# Patient Record
Sex: FEMALE | Race: WHITE | NOT HISPANIC OR LATINO | Employment: OTHER | URBAN - METROPOLITAN AREA
[De-identification: names, ages, dates, MRNs, and addresses within clinical notes are randomized per-mention and may not be internally consistent; named-entity substitution may affect disease eponyms.]

---

## 2017-07-13 RX ORDER — ESOMEPRAZOLE MAGNESIUM 40 MG/1
CAPSULE, DELAYED RELEASE ORAL
Qty: 90 CAPSULE | Refills: 2 | OUTPATIENT
Start: 2017-07-13

## 2017-09-20 ENCOUNTER — CLINICAL SUPPORT (OUTPATIENT)
Dept: AUDIOLOGY | Facility: CLINIC | Age: 47
End: 2017-09-20
Payer: COMMERCIAL

## 2017-09-20 ENCOUNTER — OFFICE VISIT (OUTPATIENT)
Dept: OTOLARYNGOLOGY | Facility: CLINIC | Age: 47
End: 2017-09-20
Payer: COMMERCIAL

## 2017-09-20 VITALS
WEIGHT: 257.69 LBS | SYSTOLIC BLOOD PRESSURE: 129 MMHG | BODY MASS INDEX: 40.36 KG/M2 | DIASTOLIC BLOOD PRESSURE: 90 MMHG | HEART RATE: 76 BPM

## 2017-09-20 DIAGNOSIS — R51.9 HEADACHE, UNSPECIFIED HEADACHE TYPE: ICD-10-CM

## 2017-09-20 DIAGNOSIS — H68.003 SALPINGITIS OF BOTH EUSTACHIAN TUBES: Primary | ICD-10-CM

## 2017-09-20 DIAGNOSIS — J32.2 CHRONIC ETHMOIDAL SINUSITIS: ICD-10-CM

## 2017-09-20 DIAGNOSIS — H93.11 TINNITUS OF RIGHT EAR: Primary | ICD-10-CM

## 2017-09-20 PROCEDURE — 99999 PR PBB SHADOW E&M-EST. PATIENT-LVL III: CPT | Mod: PBBFAC,,, | Performed by: OTOLARYNGOLOGY

## 2017-09-20 PROCEDURE — 99999 PR PBB SHADOW E&M-EST. PATIENT-LVL I: CPT | Mod: PBBFAC,,,

## 2017-09-20 PROCEDURE — 3008F BODY MASS INDEX DOCD: CPT | Mod: S$GLB,,, | Performed by: OTOLARYNGOLOGY

## 2017-09-20 PROCEDURE — 31231 NASAL ENDOSCOPY DX: CPT | Mod: S$GLB,,, | Performed by: OTOLARYNGOLOGY

## 2017-09-20 PROCEDURE — 3074F SYST BP LT 130 MM HG: CPT | Mod: S$GLB,,, | Performed by: OTOLARYNGOLOGY

## 2017-09-20 PROCEDURE — 3080F DIAST BP >= 90 MM HG: CPT | Mod: S$GLB,,, | Performed by: OTOLARYNGOLOGY

## 2017-09-20 PROCEDURE — 92550 TYMPANOMETRY & REFLEX THRESH: CPT | Mod: S$GLB,,, | Performed by: AUDIOLOGIST

## 2017-09-20 PROCEDURE — 92557 COMPREHENSIVE HEARING TEST: CPT | Mod: S$GLB,,, | Performed by: AUDIOLOGIST

## 2017-09-20 PROCEDURE — 99214 OFFICE O/P EST MOD 30 MIN: CPT | Mod: 25,S$GLB,, | Performed by: OTOLARYNGOLOGY

## 2017-09-20 RX ORDER — CITALOPRAM 40 MG/1
40 TABLET, FILM COATED ORAL DAILY
COMMUNITY
End: 2018-10-23

## 2017-09-20 RX ORDER — LEVOTHYROXINE SODIUM 125 UG/1
150 TABLET ORAL DAILY
COMMUNITY
End: 2018-12-20

## 2017-09-20 RX ORDER — MONTELUKAST SODIUM 10 MG/1
10 TABLET ORAL NIGHTLY
COMMUNITY
End: 2019-01-10

## 2017-09-20 RX ORDER — MINERAL OIL
180 ENEMA (ML) RECTAL DAILY
COMMUNITY
End: 2019-01-10

## 2017-09-20 NOTE — PROGRESS NOTES
Subjective:      Ailyn is a 47 y.o. female who comes for follow-up of sinusitis.  Last seen 1-1/2 years ago.  Now main complaint is intermittent aural fullness, bilateral moderate severity, unsure if it affects hearing.  Treated in urgent care 6 times this year, twice in past month, each time with antibiotics.  Also daily, constant headache in bilateral cheeks and forehead.  Breathing well through nose, very keen sense of smell.  Using only saline rinse.    SNOT-22 score = 52, NOSE score = 15%, ETDQ-7 score = 4.6    The patient's medications, allergies, past medical, surgical, social and family histories were reviewed and updated as appropriate.    A detailed review of systems was obtained with pertinent positives as per the above HPI, and otherwise negative.        Objective:     BP (!) 129/90   Pulse 76   Wt 116.9 kg (257 lb 11.5 oz)   BMI 40.36 kg/m²        Constitutional:   She appears well-developed. She is cooperative. Normal speech.  No hoarse voice.      Head:  Normocephalic. Salivary glands normal.  Facial strength is normal.      Ears:    Right Ear: No drainage or tenderness. Tympanic membrane is not perforated. Tympanic membrane mobility is normal. No middle ear effusion. No decreased hearing is noted.   Left Ear: No drainage or tenderness. Tympanic membrane is not perforated. Tympanic membrane mobility is normal.  No middle ear effusion. No decreased hearing is noted.     Nose:  No mucosal edema, rhinorrhea, septal deviation or polyps. No epistaxis. Turbinates normal, no turbinate masses and no turbinate hypertrophy.  Right sinus exhibits no maxillary sinus tenderness and no frontal sinus tenderness. Left sinus exhibits no maxillary sinus tenderness and no frontal sinus tenderness.     Mouth/Throat  Oropharynx clear and moist without lesions or asymmetry and normal uvula midline. She does not have dentures. Normal dentition. No oral lesions or mucous membrane lesions. No oropharyngeal exudate or  posterior oropharyngeal erythema. Mirror exam not performed due to patient tolerance.  Mirror exam not performed due to patient tolerance.      Neck:  Neck normal without thyromegaly masses, asymmetry, normal tracheal structure, crepitus, and tenderness, thyroid normal, trachea normal and no adenopathy. Normal range of motion present.     She has no cervical adenopathy.     Cardiovascular:   Regular rhythm.      Pulmonary/Chest:   Effort normal.     Psychiatric:   She has a normal mood and affect. Her speech is normal and behavior is normal.     Neurological:   No cranial nerve deficit.     Skin:   No rash noted.       Procedure    Nasal endoscopy performed.  See procedure note.     Left nasal valve     Left middle meatus     Left frontal ostia     Left ET with edema      Right nasal valve      Right middle meatus     Right frontal osium     Right ET with edema        Data Reviewed    WBC (K/uL)   Date Value   02/11/2015 11.01     Eosinophil% (%)   Date Value   02/11/2015 3.1     Eos # (K/uL)   Date Value   02/11/2015 0.3     Platelets (K/uL)   Date Value   02/11/2015 285     Glucose (mg/dL)   Date Value   02/11/2015 140 (H)     No results found for: IGE    Pathology report indicated non-eosinophilic chronic inflammation.          I independently reviewed the tracings of the complete audiometric evaluation performed today.  I reviewed the audiogram with the patient as well.  Pertinent findings include a normal study.          Assessment:     1. Salpingitis of both eustachian tubes    2. Chronic ethmoidal sinusitis    3. Headache, unspecified headache type         Plan:     Rx Qnasl to treat salpingitis.  Suggested referral to neurology to address non-sinugenic headaches, but she wishes to address eyeglasses first, thinks that might be the cause.  May consider BDET in future.  Return if symptoms worsen or fail to improve.

## 2017-09-20 NOTE — LETTER
September 20, 2017      [place]  [address] [address]  [place] [address]           [facility] - Otorhinolaryngology  [address]  [facility] [address]  Phone: [phone]  Fax: [phone]          Patient: Ailyn Yanes   MR Number: [id number]   YOB: 1970   Date of Visit: 9/20/2017       Dear Consuelo Kan:    Thank you for referring Ailyn Yanes to me for evaluation. Attached you will find relevant portions of my assessment and plan of care.    If you have questions, please do not hesitate to call me. I look forward to following Ailyn Yanes along with you.    Sincerely,    Nahun Luna MD    Enclosure  CC:  No Recipients    If you would like to receive this communication electronically, please contact [email or web address] or [phone] to request more information on [employer] access.    For providers and/or their staff who would like to refer a patient to [facility], please contact us through our one-stop-shop provider referral line, [facility], at [phone].    If you feel you have received this communication in error or would no longer like to receive these types of communications, please e-mail [email or web address]

## 2017-09-20 NOTE — PROCEDURES
Nasal/sinus endoscopy  Date/Time: 9/20/2017 4:59 PM  Performed by: ELIZABETH LEAHY  Authorized by: ELIZABETH LEAHY     Consent Done?:  Yes (Verbal)  Anesthesia:     Local anesthetic:  Lidocaine 4% and Isaac-Synephrine 1/2%    Patient tolerance:  Patient tolerated the procedure well with no immediate complications  Nose:     Procedure Performed:  Nasal Endoscopy  External:      No external nasal deformity  Intranasal:      Mucosa no polyps     Mucosa ulcers not present     No mucosa lesions present     Turbinates not enlarged     No septum gross deformity  Nasopharynx:      No mucosa lesions     Adenoids not present     Posterior choanae patent     Eustachian tube not patent     Sinuses all patent, clear and well-healed.  No exudate or polyps to explain headaches.  Moderate bilateral Eustachian tube inflammation with mucus.

## 2017-09-22 ENCOUNTER — TELEPHONE (OUTPATIENT)
Dept: OTOLARYNGOLOGY | Facility: CLINIC | Age: 47
End: 2017-09-22

## 2017-09-22 DIAGNOSIS — J32.2 CHRONIC ETHMOIDAL SINUSITIS: Primary | ICD-10-CM

## 2017-09-22 NOTE — TELEPHONE ENCOUNTER
LPN spoke with patient and advised her that her medication was sent in. Patient verbalized understanding.

## 2017-11-29 ENCOUNTER — OFFICE VISIT (OUTPATIENT)
Dept: DERMATOLOGY | Facility: CLINIC | Age: 47
End: 2017-11-29
Payer: COMMERCIAL

## 2017-11-29 DIAGNOSIS — L29.9 PRURITIC CONDITION: Primary | ICD-10-CM

## 2017-11-29 DIAGNOSIS — R21 RASH AND NONSPECIFIC SKIN ERUPTION: ICD-10-CM

## 2017-11-29 PROCEDURE — 99999 PR PBB SHADOW E&M-EST. PATIENT-LVL III: CPT | Mod: PBBFAC,,, | Performed by: DERMATOLOGY

## 2017-11-29 PROCEDURE — 88312 SPECIAL STAINS GROUP 1: CPT | Mod: 26,,, | Performed by: PATHOLOGY

## 2017-11-29 PROCEDURE — 99202 OFFICE O/P NEW SF 15 MIN: CPT | Mod: 25,S$GLB,, | Performed by: DERMATOLOGY

## 2017-11-29 PROCEDURE — 88305 TISSUE EXAM BY PATHOLOGIST: CPT | Performed by: PATHOLOGY

## 2017-11-29 NOTE — PROGRESS NOTES
Subjective:       Patient ID:  Ailyn Yanes is a 47 y.o. female who presents for   Chief Complaint   Patient presents with    Rash     on back x 4 wks itchy,bleed,tender      Rash  - Initial  Affected locations: back  Duration: 4 weeks (4-5 years; now flaring x 4 weeks)  Signs / symptoms: itching, tender and burning  Treatments tried: keto cream,tac cream, betamethasone,bactroban; Clindamycin, minocycline.  Improvement on treatment: no relief    Currently using ketoconazole cream bid x 4 weeks  Has hot tub but does not go in it  Sweats excessively but has improved  Denies personal or family h/o skin cancer    Past Medical History:   Diagnosis Date    Allergy     Hypertension     Prediabetes     Thyroid disease      Review of Systems   Constitutional: Negative for fever, chills, fatigue and malaise.   Skin: Positive for itching and rash.   Allergic/Immunologic: Positive for environmental allergies.        Objective:    Physical Exam   Constitutional: She appears well-developed and well-nourished. No distress.   Neurological: She is alert and oriented to person, place, and time. She is not disoriented.   Psychiatric: She has a normal mood and affect.   Skin:   Areas Examined (abnormalities noted in diagram):   Head / Face Inspection Performed  Neck Inspection Performed  Chest / Axilla Inspection Performed  Abdomen Inspection Performed  Back Inspection Performed  RUE Inspected  LUE Inspection Performed              Diagram Legend     Erythematous scaling macule/papule c/w actinic keratosis       Vascular papule c/w angioma      Pigmented verrucoid papule/plaque c/w seborrheic keratosis      Yellow umbilicated papule c/w sebaceous hyperplasia      Irregularly shaped tan macule c/w lentigo     1-2 mm smooth white papules consistent with Milia      Movable subcutaneous cyst with punctum c/w epidermal inclusion cyst      Subcutaneous movable cyst c/w pilar cyst      Firm pink to brown papule c/w dermatofibroma       Pedunculated fleshy papule(s) c/w skin tag(s)      Evenly pigmented macule c/w junctional nevus     Mildly variegated pigmented, slightly irregular-bordered macule c/w mildly atypical nevus      Flesh colored to evenly pigmented papule c/w intradermal nevus       Pink pearly papule/plaque c/w basal cell carcinoma      Erythematous hyperkeratotic cursted plaque c/w SCC      Surgical scar with no sign of skin cancer recurrence      Open and closed comedones      Inflammatory papules and pustules      Verrucoid papule consistent consistent with wart     Erythematous eczematous patches and plaques     Dystrophic onycholytic nail with subungual debris c/w onychomycosis     Umbilicated papule    Erythematous-base heme-crusted tan verrucoid plaque consistent with inflamed seborrheic keratosis     Erythematous Silvery Scaling Plaque c/w Psoriasis     See annotation      Assessment / Plan:      Pathology Orders:      Normal Orders This Visit    Tissue Specimen To Pathology, Dermatology     Questions:    Directional Terms:  Other(comment)    Clinical information:  folliculitis vs other    Specific Site:  lower back        Pruritic Condition  Excessive sweating can contribute to this condition (suspect folliculitis)  Continue ketoconazole cream 2% bid    Rash and nonspecific skin eruption  -     Tissue Specimen To Pathology, Dermatology  Punch biopsy procedure note:  Punch biopsy performed after verbal consent obtained. Area marked and prepped with alcohol. Approximately 1cc of 1% lidocaine with epinephrine injected. 4 mm disposable punch used to remove lesion. Hemostasis obtained and biopsy site closed with 2 Prolene sutures. Wound care instructions reviewed with patient and handout given.             Return in about 2 weeks (around 12/13/2017).

## 2017-12-12 ENCOUNTER — OFFICE VISIT (OUTPATIENT)
Dept: DERMATOLOGY | Facility: CLINIC | Age: 47
End: 2017-12-12
Payer: COMMERCIAL

## 2017-12-12 ENCOUNTER — LAB VISIT (OUTPATIENT)
Dept: LAB | Facility: HOSPITAL | Age: 47
End: 2017-12-12
Attending: DERMATOLOGY
Payer: COMMERCIAL

## 2017-12-12 DIAGNOSIS — L73.9 CHRONIC FOLLICULITIS: Primary | ICD-10-CM

## 2017-12-12 DIAGNOSIS — Z51.81 ENCOUNTER FOR MEDICATION MONITORING: ICD-10-CM

## 2017-12-12 LAB
ALBUMIN SERPL BCP-MCNC: 3.6 G/DL
ALP SERPL-CCNC: 128 U/L
ALT SERPL W/O P-5'-P-CCNC: 25 U/L
ANION GAP SERPL CALC-SCNC: 11 MMOL/L
AST SERPL-CCNC: 18 U/L
BASOPHILS # BLD AUTO: 0.09 K/UL
BASOPHILS NFR BLD: 0.8 %
BILIRUB SERPL-MCNC: 0.3 MG/DL
BUN SERPL-MCNC: 8 MG/DL
CALCIUM SERPL-MCNC: 9.6 MG/DL
CHLORIDE SERPL-SCNC: 103 MMOL/L
CHOLEST SERPL-MCNC: 126 MG/DL
CHOLEST/HDLC SERPL: 3.2 {RATIO}
CO2 SERPL-SCNC: 24 MMOL/L
CREAT SERPL-MCNC: 1 MG/DL
DIFFERENTIAL METHOD: ABNORMAL
EOSINOPHIL # BLD AUTO: 0.3 K/UL
EOSINOPHIL NFR BLD: 2.3 %
ERYTHROCYTE [DISTWIDTH] IN BLOOD BY AUTOMATED COUNT: 13.2 %
EST. GFR  (AFRICAN AMERICAN): >60 ML/MIN/1.73 M^2
EST. GFR  (NON AFRICAN AMERICAN): >60 ML/MIN/1.73 M^2
GLUCOSE SERPL-MCNC: 210 MG/DL
HCG INTACT+B SERPL-ACNC: <1.2 MIU/ML
HCT VFR BLD AUTO: 37.9 %
HDLC SERPL-MCNC: 39 MG/DL
HDLC SERPL: 31 %
HGB BLD-MCNC: 12.4 G/DL
IMM GRANULOCYTES # BLD AUTO: 0.09 K/UL
IMM GRANULOCYTES NFR BLD AUTO: 0.8 %
LDLC SERPL CALC-MCNC: 73.6 MG/DL
LYMPHOCYTES # BLD AUTO: 2.7 K/UL
LYMPHOCYTES NFR BLD: 22.7 %
MCH RBC QN AUTO: 29.2 PG
MCHC RBC AUTO-ENTMCNC: 32.7 G/DL
MCV RBC AUTO: 89 FL
MONOCYTES # BLD AUTO: 0.5 K/UL
MONOCYTES NFR BLD: 4.5 %
NEUTROPHILS # BLD AUTO: 8.3 K/UL
NEUTROPHILS NFR BLD: 68.9 %
NONHDLC SERPL-MCNC: 87 MG/DL
NRBC BLD-RTO: 0 /100 WBC
PLATELET # BLD AUTO: 315 K/UL
PMV BLD AUTO: 10.1 FL
POTASSIUM SERPL-SCNC: 3.7 MMOL/L
PROT SERPL-MCNC: 7.7 G/DL
RBC # BLD AUTO: 4.25 M/UL
SODIUM SERPL-SCNC: 138 MMOL/L
TRIGL SERPL-MCNC: 67 MG/DL
WBC # BLD AUTO: 12 K/UL

## 2017-12-12 PROCEDURE — 99213 OFFICE O/P EST LOW 20 MIN: CPT | Mod: S$GLB,,, | Performed by: DERMATOLOGY

## 2017-12-12 PROCEDURE — 85025 COMPLETE CBC W/AUTO DIFF WBC: CPT

## 2017-12-12 PROCEDURE — 84702 CHORIONIC GONADOTROPIN TEST: CPT

## 2017-12-12 PROCEDURE — 36415 COLL VENOUS BLD VENIPUNCTURE: CPT

## 2017-12-12 PROCEDURE — 80053 COMPREHEN METABOLIC PANEL: CPT

## 2017-12-12 PROCEDURE — 99999 PR PBB SHADOW E&M-EST. PATIENT-LVL II: CPT | Mod: PBBFAC,,, | Performed by: DERMATOLOGY

## 2017-12-12 PROCEDURE — 80061 LIPID PANEL: CPT

## 2017-12-12 RX ORDER — ERYTHROMYCIN ETHYLSUCCINATE 400 MG/1
400 TABLET ORAL 2 TIMES DAILY WITH MEALS
Qty: 60 TABLET | Refills: 0 | Status: SHIPPED | OUTPATIENT
Start: 2017-12-12 | End: 2017-12-29 | Stop reason: SDUPTHER

## 2017-12-12 NOTE — PROGRESS NOTES
Subjective:       Patient ID:  Ailyn Yanes is a 47 y.o. female who presents for   Chief Complaint   Patient presents with    Rash     s/r and discussion of treatment options     HPI  46 yo F presents for s/r and biopsy results discussion  Rash x 4 years  Treatments tried: keto cream,tac cream, betamethasone,bactroban; Clindamycin, minocycline    Past Medical History:   Diagnosis Date    Allergy     Hypertension     Prediabetes     Thyroid disease      Review of Systems   Constitutional: Negative for fever, chills, fatigue and malaise.   Genitourinary: Negative for irregular periods (LMP 4 years ago; on Depo Provera; not yet perimenopausal based on labwork; still sexually active with ).   Skin: Positive for itching and rash.   Allergic/Immunologic: Positive for environmental allergies.        Objective:    Physical Exam   Constitutional: She appears well-developed and well-nourished. No distress.   Neurological: She is alert and oriented to person, place, and time. She is not disoriented.   Psychiatric: She has a normal mood and affect.   Skin:   Areas Examined (abnormalities noted in diagram):   Head / Face Inspection Performed  Neck Inspection Performed  Chest / Axilla Inspection Performed  Abdomen Inspection Performed  Back Inspection Performed  RUE Inspected  LUE Inspection Performed              Diagram Legend     Erythematous scaling macule/papule c/w actinic keratosis       Vascular papule c/w angioma      Pigmented verrucoid papule/plaque c/w seborrheic keratosis      Yellow umbilicated papule c/w sebaceous hyperplasia      Irregularly shaped tan macule c/w lentigo     1-2 mm smooth white papules consistent with Milia      Movable subcutaneous cyst with punctum c/w epidermal inclusion cyst      Subcutaneous movable cyst c/w pilar cyst      Firm pink to brown papule c/w dermatofibroma      Pedunculated fleshy papule(s) c/w skin tag(s)      Evenly pigmented macule c/w junctional nevus      Mildly variegated pigmented, slightly irregular-bordered macule c/w mildly atypical nevus      Flesh colored to evenly pigmented papule c/w intradermal nevus       Pink pearly papule/plaque c/w basal cell carcinoma      Erythematous hyperkeratotic cursted plaque c/w SCC      Surgical scar with no sign of skin cancer recurrence      Open and closed comedones      Inflammatory papules and pustules      Verrucoid papule consistent consistent with wart     Erythematous eczematous patches and plaques     Dystrophic onycholytic nail with subungual debris c/w onychomycosis     Umbilicated papule    Erythematous-base heme-crusted tan verrucoid plaque consistent with inflamed seborrheic keratosis     Erythematous Silvery Scaling Plaque c/w Psoriasis     See annotation    PATHOLOGY 11/19/17  Skin, lower back, punch biopsy:  - CHRONIC PERIFOLLICULITIS.  MICROSCOPIC DESCRIPTION: Sections show a dilated follicle surrounded by a dense lymphohistiocytic infiltrate.  The adjacent dermis is mildly fibrotic, and there is an adjacent mild perivascular lymphohistiocytic infiltrate with rarer  plasma cells and neutrophils. Gram stain is negative for bacteria. GMS stain is negative for fungi. Appropriately  reactive controls were reviewed. Multiple levels were examined    Assessment / Plan:        Chronic folliculitis  iPledge handout given  Pt allergic to clindamycin and minocycline (rash) so will try EES while she decides if she'd like to proceed with accutane  -     erythromycin ethylsuccinate (EES) 400 MG Tab; Take 1 tablet (400 mg total) by mouth 2 (two) times daily with meals.  Dispense: 60 tablet; Refill: 0  Discussed risks and benefits of Isotretinoin including but not limited to dry eyes, dry skin, and dry lips; headaches; nosebleeds; muscle aches; joint aches; elevated liver functions; elevated cholesterol or triglycerides; depression; diarrhea/stomach cramping (inflammatory bowel disease); increased sun sensitivity; birth  defects. No laser while on Isotretinoin or for 4-6 months after completion of Isotretinoin course. No waxing and no donating blood while on Isotretinoin or for one month after completion of Isotretinoin course.     If pt chooses to proceed, will get consents signed and enter patient into Ipledge program.  Will check baseline lipid panel, liver function tests and pregnancy test.   If labs normal, will start Isotretinoin at 80 mg po daily.   Brochures reviewed and provided.    Encounter for medication monitoring  -     CBC auto differential; Future  -     Comprehensive metabolic panel; Future  -     Lipid panel; Future  -     hCG, quantitative; Future         Return in about 4 weeks (around 1/9/2018).

## 2017-12-29 DIAGNOSIS — L73.9 CHRONIC FOLLICULITIS: ICD-10-CM

## 2018-01-03 RX ORDER — ERYTHROMYCIN ETHYLSUCCINATE 400 MG/1
400 TABLET ORAL 2 TIMES DAILY WITH MEALS
Qty: 60 TABLET | Refills: 0 | Status: SHIPPED | OUTPATIENT
Start: 2018-01-03 | End: 2018-10-23 | Stop reason: ALTCHOICE

## 2018-05-14 ENCOUNTER — OFFICE VISIT (OUTPATIENT)
Dept: ORTHOPEDICS | Facility: CLINIC | Age: 48
End: 2018-05-14
Payer: COMMERCIAL

## 2018-05-14 ENCOUNTER — HOSPITAL ENCOUNTER (OUTPATIENT)
Dept: RADIOLOGY | Facility: HOSPITAL | Age: 48
Discharge: HOME | End: 2018-05-14
Attending: PHYSICIAN ASSISTANT
Payer: COMMERCIAL

## 2018-05-14 VITALS — BODY MASS INDEX: 37.67 KG/M2 | WEIGHT: 240.5 LBS

## 2018-05-14 DIAGNOSIS — M25.561 RIGHT KNEE PAIN, UNSPECIFIED CHRONICITY: ICD-10-CM

## 2018-05-14 DIAGNOSIS — M25.561 RIGHT KNEE PAIN, UNSPECIFIED CHRONICITY: Primary | ICD-10-CM

## 2018-05-14 PROCEDURE — 99999 PR PBB SHADOW E&M-EST. PATIENT-LVL III: CPT | Mod: PBBFAC,,, | Performed by: PHYSICIAN ASSISTANT

## 2018-05-14 PROCEDURE — 3008F BODY MASS INDEX DOCD: CPT | Mod: CPTII,S$GLB,, | Performed by: PHYSICIAN ASSISTANT

## 2018-05-14 PROCEDURE — 99203 OFFICE O/P NEW LOW 30 MIN: CPT | Mod: S$GLB,,, | Performed by: PHYSICIAN ASSISTANT

## 2018-05-14 PROCEDURE — 73564 X-RAY EXAM KNEE 4 OR MORE: CPT | Mod: 26,RT,, | Performed by: RADIOLOGY

## 2018-05-14 PROCEDURE — 73562 X-RAY EXAM OF KNEE 3: CPT | Mod: 26,XS,LT, | Performed by: RADIOLOGY

## 2018-05-14 PROCEDURE — 73562 X-RAY EXAM OF KNEE 3: CPT | Mod: TC,LT

## 2018-05-14 RX ORDER — SERTRALINE HYDROCHLORIDE 25 MG/1
25 TABLET, FILM COATED ORAL DAILY
COMMUNITY
End: 2018-12-20

## 2018-05-14 RX ORDER — METFORMIN HYDROCHLORIDE 500 MG/1
500 TABLET ORAL 2 TIMES DAILY WITH MEALS
COMMUNITY

## 2018-05-14 NOTE — LETTER
May 14, 2018      Consuelo Kan, NP  [address]  [place] [id number]           [facility] - Orthopedics  [address]  [facility] [address]  Phone: [phone]          Patient: Ailyn Yanes   MR Number: [id number]   YOB: 1970   Date of Visit: 5/14/2018       Dear Consuelo Kan:    Thank you for referring Ailyn Yanes to me for evaluation. Attached you will find relevant portions of my assessment and plan of care.    If you have questions, please do not hesitate to call me. I look forward to following Ailyn Yanes along with you.    Sincerely,    Chayo Marks PA-C    Enclosure  CC:  No Recipients    If you would like to receive this communication electronically, please contact [email or web address] or [phone] to request more information on [employer] Link access.    For providers and/or their staff who would like to refer a patient to [facility], please contact us through our one-stop-shop provider referral line, [facility], at [phone].    If you feel you have received this communication in error or would no longer like to receive these types of communications, please e-mail [email or web address]

## 2018-05-14 NOTE — PROGRESS NOTES
Subjective:      Patient ID: Ailyn Yanes is a 47 y.o. female.    Chief Complaint: No chief complaint on file.    HPI  47 year old female presents with chief complaint of right knee pain x 2 months. She was bending over making her bed and the knee popped. Pain has been worsening since. It is burning pain at the lateral aspect. It is worse with knee flexion. She can't kneel. She is taking tylenol with little relief. She can't take nsaids because it causes throat swelling. She reports swelling and popping. She does not use assistive devices.    Review of Systems   Constitution: Negative for chills, fever and night sweats.   Cardiovascular: Negative for chest pain.   Respiratory: Negative for cough and shortness of breath.    Hematologic/Lymphatic: Does not bruise/bleed easily.   Skin: Negative for color change.   Gastrointestinal: Negative for heartburn.   Genitourinary: Negative for dysuria.   Neurological: Negative for numbness and paresthesias.   Psychiatric/Behavioral: Negative for altered mental status.   Allergic/Immunologic: Negative for persistent infections.         Objective:            Ortho/SPM Exam  General :   alert, appears stated age and cooperative   Gait: Normal. The patient can bear weight on the injured extremity.   Right Lower Extremity  Hip Palpation:  no tenderness over the greater  trochanter   Hip ROM: 100% of normal    Knee Effusion:  None.   Ecchymosis:  none   Knee ROM:  0 to 110 degrees with subpatellar   crepitance.   Patella:  Patella does track normally.  Patellar apprehension test: negative  Patellar compression test: negative   Tenderness: lateral joint line   Stability:  Lachman's test: negative  Posterior drawer: negative  Medial collateral ligament: negative  Lateral collateral ligament: negative         Jay's Test:  Mild pain   Sensation:   intact to light touch   Pulses: normal DP and PT pulses         X-ray: ordered and reviewed by myself. No fx or dislocation.         Assessment:       Encounter Diagnosis   Name Primary?    Right knee pain, unspecified chronicity Yes          Plan:       MRI was ordered to r/o meniscus tear. RTC pending results.

## 2018-05-18 ENCOUNTER — HOSPITAL ENCOUNTER (OUTPATIENT)
Dept: RADIOLOGY | Facility: HOSPITAL | Age: 48
Discharge: HOME | End: 2018-05-18
Attending: PHYSICIAN ASSISTANT
Payer: COMMERCIAL

## 2018-05-18 ENCOUNTER — TELEPHONE (OUTPATIENT)
Dept: ORTHOPEDICS | Facility: CLINIC | Age: 48
End: 2018-05-18

## 2018-05-18 DIAGNOSIS — M25.561 RIGHT KNEE PAIN, UNSPECIFIED CHRONICITY: ICD-10-CM

## 2018-05-18 PROCEDURE — 73721 MRI JNT OF LWR EXTRE W/O DYE: CPT | Mod: TC,RT

## 2018-05-18 PROCEDURE — 73721 MRI JNT OF LWR EXTRE W/O DYE: CPT | Mod: 26,RT,, | Performed by: RADIOLOGY

## 2018-05-18 NOTE — TELEPHONE ENCOUNTER
Spoke to patient regarding MRI results. She would like to try a cortisone injection. She will f/u on 5/21 for injection.

## 2018-05-21 ENCOUNTER — OFFICE VISIT (OUTPATIENT)
Dept: ORTHOPEDICS | Facility: CLINIC | Age: 48
End: 2018-05-21
Payer: COMMERCIAL

## 2018-05-21 VITALS — BODY MASS INDEX: 37.75 KG/M2 | WEIGHT: 240.5 LBS | HEIGHT: 67 IN

## 2018-05-21 DIAGNOSIS — M22.41 CHONDROMALACIA OF RIGHT PATELLA: Primary | ICD-10-CM

## 2018-05-21 PROCEDURE — 99999 PR PBB SHADOW E&M-EST. PATIENT-LVL III: CPT | Mod: PBBFAC,,, | Performed by: PHYSICIAN ASSISTANT

## 2018-05-21 PROCEDURE — 99499 UNLISTED E&M SERVICE: CPT | Mod: S$GLB,,, | Performed by: PHYSICIAN ASSISTANT

## 2018-05-21 PROCEDURE — 3008F BODY MASS INDEX DOCD: CPT | Mod: CPTII,S$GLB,, | Performed by: PHYSICIAN ASSISTANT

## 2018-05-21 PROCEDURE — 20610 DRAIN/INJ JOINT/BURSA W/O US: CPT | Mod: RT,S$GLB,, | Performed by: PHYSICIAN ASSISTANT

## 2018-05-21 RX ORDER — METHYLPREDNISOLONE ACETATE 80 MG/ML
80 INJECTION, SUSPENSION INTRA-ARTICULAR; INTRALESIONAL; INTRAMUSCULAR; SOFT TISSUE
Status: COMPLETED | OUTPATIENT
Start: 2018-05-21 | End: 2018-05-21

## 2018-05-21 RX ADMIN — METHYLPREDNISOLONE ACETATE 80 MG: 80 INJECTION, SUSPENSION INTRA-ARTICULAR; INTRALESIONAL; INTRAMUSCULAR; SOFT TISSUE at 06:05

## 2018-05-21 NOTE — PROGRESS NOTES
Subjective:      Patient ID: Ailyn Yanes is a 47 y.o. female.    Chief Complaint: No chief complaint on file.    HPI  Patient returns regarding her right knee pain. She was seen 1 week ago and MRI was ordered which showed chondromalacia patella. She cannot take nsaids due to allergy. She is here today for a cortisone injection.   Review of Systems   Constitution: Negative for chills, fever and night sweats.   Cardiovascular: Negative for chest pain.   Respiratory: Negative for cough and shortness of breath.    Hematologic/Lymphatic: Does not bruise/bleed easily.   Skin: Negative for color change.   Gastrointestinal: Negative for heartburn.   Genitourinary: Negative for dysuria.   Neurological: Negative for numbness and paresthesias.   Psychiatric/Behavioral: Negative for altered mental status.   Allergic/Immunologic: Negative for persistent infections.         Objective:            General    Vitals reviewed.  Constitutional: She is oriented to person, place, and time. She appears well-developed and well-nourished.   Cardiovascular: Normal rate.    Neurological: She is alert and oriented to person, place, and time.             Right Knee Exam:  ROM 0-110 degrees  +crepitus  No effusion  TTP medial and lateral joint line          Assessment:       Encounter Diagnosis   Name Primary?    Chondromalacia of right patella Yes          Plan:       Patient would like a cortisone injection. Ice and elevate knee. RTC prn. Will order PT if no improvement.     PROCEDURE:  I have explained the risks, benefits, and alternatives of the procedure in detail.  The patient voices understanding and all questions have been answered.  The patient agrees to proceed as planned. So after I performed a sterile prep of the skin in the normal fashion the right knee is injected using a 22 gauge needle from the anterolateral approach with a combination of 4cc 1% plain lidocaine and 80 mg of depo medrol.  The patient is cautioned and  immediate relief of pain is secondary to the local anesthetic and will be temporary.  After the anesthetic wears off there may be a increase in pain that may last for a few hours or a few days and they should use ice to help alleviate this flair up of pain.

## 2018-10-23 ENCOUNTER — OFFICE VISIT (OUTPATIENT)
Dept: PODIATRY | Facility: CLINIC | Age: 48
End: 2018-10-23
Payer: COMMERCIAL

## 2018-10-23 VITALS
HEART RATE: 69 BPM | WEIGHT: 240 LBS | HEIGHT: 67 IN | BODY MASS INDEX: 37.67 KG/M2 | SYSTOLIC BLOOD PRESSURE: 134 MMHG | RESPIRATION RATE: 18 BRPM | DIASTOLIC BLOOD PRESSURE: 86 MMHG

## 2018-10-23 DIAGNOSIS — L60.0 INCURVED TOENAIL: Primary | ICD-10-CM

## 2018-10-23 DIAGNOSIS — M72.2 PLANTAR FASCIITIS: ICD-10-CM

## 2018-10-23 PROCEDURE — 3079F DIAST BP 80-89 MM HG: CPT | Mod: CPTII,S$GLB,, | Performed by: PODIATRIST

## 2018-10-23 PROCEDURE — 99203 OFFICE O/P NEW LOW 30 MIN: CPT | Mod: S$GLB,,, | Performed by: PODIATRIST

## 2018-10-23 PROCEDURE — 3075F SYST BP GE 130 - 139MM HG: CPT | Mod: CPTII,S$GLB,, | Performed by: PODIATRIST

## 2018-10-23 PROCEDURE — 99999 PR PBB SHADOW E&M-EST. PATIENT-LVL III: CPT | Mod: PBBFAC,,, | Performed by: PODIATRIST

## 2018-10-23 PROCEDURE — 3008F BODY MASS INDEX DOCD: CPT | Mod: CPTII,S$GLB,, | Performed by: PODIATRIST

## 2018-10-24 NOTE — PROGRESS NOTES
Subjective:      Patient ID: Ailyn Yanes is a 48 y.o. female.    Chief Complaint: PCP (JOSE GOLDBERG); Nail Problem; Ingrown Toenail (both great toenails ); and Foot Problem (Heel pain both feet )    Ailyn is a 48 y.o. female who presents to the clinic complaining of painful ingrown toenail on both feet. And L heel pain c/w previous PF she had on the R foot.           Patient Active Problem List   Diagnosis    Chronic tonsillitis    Nasal turbinate hypertrophy    Chronic ethmoidal sinusitis    Chronic maxillary sinusitis    Chronic frontal sinusitis    Essential hypertension       Current Outpatient Medications on File Prior to Visit   Medication Sig Dispense Refill    calcium carbonate/vitamin D3 (VITAMIN D-3 ORAL) Take by mouth.      esomeprazole (NEXIUM) 40 MG capsule TAKE 1 CAPSULE (40 MG TOTAL) BY MOUTH BEFORE DINNER. 90 capsule 2    fexofenadine (ALLEGRA) 180 MG tablet Take 180 mg by mouth once daily.      hydrochlorothiazide (HYDRODIURIL) 25 MG tablet Take 25 mg by mouth once daily.      levothyroxine (SYNTHROID) 125 MCG tablet Take 150 mcg by mouth once daily.       medroxyPROGESTERone (DEPO-PROVERA) 150 mg/mL injection Inject 150 mg into the muscle every 3 (three) months.      metFORMIN (GLUCOPHAGE) 500 MG tablet Take 500 mg by mouth 2 (two) times daily with meals.      montelukast (SINGULAIR) 10 mg tablet Take 10 mg by mouth every evening.      multivitamin capsule Take 1 capsule by mouth once daily.      sertraline (ZOLOFT) 25 MG tablet Take 25 mg by mouth once daily.      valsartan (DIOVAN) 80 MG tablet Take 80 mg by mouth once daily.       No current facility-administered medications on file prior to visit.        Review of patient's allergies indicates:   Allergen Reactions    Ace inhibitors Other (See Comments)     cough    Amoxicillin     Aspirin Hives    Ceftin [cefuroxime axetil] Hives    Ibuprofen Swelling    Macrobid [nitrofurantoin monohyd/m-cryst] Nausea And  Vomiting    Mobic [meloxicam] Swelling    Clindamycin Rash    Solodyn [minocycline] Rash       Past Surgical History:   Procedure Laterality Date     SECTION      KIDNEY STONE SURGERY Left 2004    REDUCTION-TURBINATE,79596 Bilateral 2015    Performed by [facility]un [facility] at [facility] OR [address] [place] [address]    SEPTOPLASTY, 60814 Bilateral 2015    [facility]    SINUS SURGERY FUNCTIONAL ENDOSCOPIC WITH NAVIGATION, 36083, 91664, 09993, 82540, 78910 Bilateral 2015    [facility]    TONSILLECTOMY      TONSILLECTOMY,51337 Bilateral 2015    Performed by [facility]un Luna [facility] at [facility] OR [address] [place] [address]       Family History   Problem Relation Age of Onset    Hypertension Mother     Cancer Mother     Diabetes Mother     Diabetes Father     Hypertension Father     Hyperlipidemia Father     Heart disease Father     Diabetes Brother     Hypertension Brother     Heart disease Paternal Grandfather     Cancer Maternal Grandmother     Hypertension Maternal Grandmother     Heart disease Maternal Grandmother     Diabetes Maternal Grandfather     Heart disease Paternal Grandmother     Melanoma Neg Hx        Social History     Socioeconomic History    Marital status:      Spouse name: Not on file    Number of children: Not on file    Years of education: Not on file    Highest education level: Not on file   Social Needs    Financial resource strain: Not on file    Food insecurity - worry: Not on file    Food insecurity - inability: Not on file    Transportation needs - medical: Not on file    Transportation needs - non-medical: Not on file   Occupational History    Not on file   Tobacco Use    Smoking status: Former Smoker     Last attempt to quit: 1993     Years since quittin.8    Smokeless tobacco: Never Used   Substance and Sexual Activity    Alcohol use: No    Drug use: Not on file    Sexual  "activity: Not on file   Other Topics Concern    Are you pregnant or think you may be? Not Asked    Breast-feeding Not Asked   Social History Narrative    Not on file           Review of Systems   Constitution: Negative for chills, decreased appetite and fever.   Cardiovascular: Negative for leg swelling.   Skin: Positive for nail changes.   Musculoskeletal: Negative for arthritis, joint pain, joint swelling and myalgias.   Gastrointestinal: Negative for nausea and vomiting.   Neurological: Negative for loss of balance, numbness and paresthesias.           Objective:       Vitals:    10/23/18 1529   BP: 134/86   Pulse: 69   Resp: 18   Weight: 108.9 kg (240 lb)   Height: 5' 7" (1.702 m)   PainSc:   4   PainLoc: Foot        Physical Exam   Constitutional: She is oriented to person, place, and time. She appears well-developed and well-nourished.   Cardiovascular:   Pulses:       Dorsalis pedis pulses are 2+ on the right side, and 2+ on the left side.        Posterior tibial pulses are 2+ on the right side, and 2+ on the left side.   Musculoskeletal: She exhibits no edema or tenderness.        Right ankle: Normal.        Left ankle: Normal.        Right foot: There is no swelling, no crepitus and no deformity.        Left foot: There is no swelling, no crepitus and no deformity.   Adequate joint range of motion without pain, limitation, nor crepitation Bilateral feet and ankle joints. Muscle strength is 5/5 in all groups bilaterally.         Pain on palpation of L  plantar heel consistent with location of patient's chief complaint. Negative Tinel's sign. No pain with lateral compression of heels.     Lymphadenopathy:   No palpable lymph nodes   Neurological: She is alert and oriented to person, place, and time. She has normal strength.   Skin: Skin is warm, dry and intact. No rash noted. No erythema. Nails show no clubbing.   Mildly incurvated nail margin w/ No surrounding erythema, edema, malodor, nor drainage noted " to b/l margin b/l hallux      Psychiatric: She has a normal mood and affect. Her behavior is normal.             Assessment:       Encounter Diagnosis   Name Primary?    Incurved toenail Yes         Plan:       Ailyn was seen today for pcp, nail problem, ingrown toenail and foot problem.    Diagnoses and all orders for this visit:    Incurved toenail      I counseled the patient on her conditions, their implications and medical management.    Discussed  Treatment options  for mildly ingrowing nails including matrixectomy vs slant back procedure. Patient elects for conservative care w/ pedicures . If ingrown toenail returns, patient will have matrixectomy procedure    Discussed different treatment options for heel pain. including conservative and interventional.  I gave written and verbal instructions on heel cord stretching and this was demonstrated for the patient. Patient expressed understanding. Discussed wearing appropriate shoe gear and avoiding flats, slippers, sandals, barefoot, and sockfeet. Recommended arch supports. My recommendation for OTC supports is Spenco polysorb replacement insoles or patient may elect more aggressive treatment with prescription arch supports. We also discussed cortisone injections and NSAID therapy.    Patient instructed on adequate icing techniques. Patient should ice the affected area at least once per day x 10 minutes for 10 days . I advised the  patient that extra icing would also be beneficial to ensure adequate anti inflammatory effect     Stretching handout dispensed to patient. Instructions on adequate stretching reviewed in clinic

## 2018-12-20 ENCOUNTER — OFFICE VISIT (OUTPATIENT)
Dept: UROLOGY | Facility: CLINIC | Age: 48
End: 2018-12-20
Payer: COMMERCIAL

## 2018-12-20 ENCOUNTER — HOSPITAL ENCOUNTER (OUTPATIENT)
Dept: RADIOLOGY | Facility: HOSPITAL | Age: 48
Discharge: HOME | End: 2018-12-20
Attending: UROLOGY
Payer: COMMERCIAL

## 2018-12-20 VITALS
TEMPERATURE: 98 F | WEIGHT: 240 LBS | BODY MASS INDEX: 37.67 KG/M2 | DIASTOLIC BLOOD PRESSURE: 86 MMHG | HEART RATE: 83 BPM | SYSTOLIC BLOOD PRESSURE: 132 MMHG | HEIGHT: 67 IN

## 2018-12-20 DIAGNOSIS — N20.0 NEPHROLITHIASIS: Primary | ICD-10-CM

## 2018-12-20 DIAGNOSIS — N20.0 NEPHROLITHIASIS: ICD-10-CM

## 2018-12-20 PROCEDURE — 3008F BODY MASS INDEX DOCD: CPT | Mod: CPTII,S$GLB,, | Performed by: UROLOGY

## 2018-12-20 PROCEDURE — 3075F SYST BP GE 130 - 139MM HG: CPT | Mod: CPTII,S$GLB,, | Performed by: UROLOGY

## 2018-12-20 PROCEDURE — 99999 PR PBB SHADOW E&M-EST. PATIENT-LVL IV: CPT | Mod: PBBFAC,,, | Performed by: UROLOGY

## 2018-12-20 PROCEDURE — 99204 OFFICE O/P NEW MOD 45 MIN: CPT | Mod: S$GLB,,, | Performed by: UROLOGY

## 2018-12-20 PROCEDURE — 3079F DIAST BP 80-89 MM HG: CPT | Mod: CPTII,S$GLB,, | Performed by: UROLOGY

## 2018-12-20 PROCEDURE — 74018 RADEX ABDOMEN 1 VIEW: CPT | Mod: TC,FY

## 2018-12-20 PROCEDURE — 74018 RADEX ABDOMEN 1 VIEW: CPT | Mod: 26,,, | Performed by: RADIOLOGY

## 2018-12-20 PROCEDURE — 87086 URINE CULTURE/COLONY COUNT: CPT

## 2018-12-20 RX ORDER — HYDROXYZINE HYDROCHLORIDE 25 MG/1
25 TABLET, FILM COATED ORAL DAILY
Refills: 6 | Status: ON HOLD | COMMUNITY
Start: 2018-11-08 | End: 2019-10-24

## 2018-12-20 RX ORDER — MINERAL OIL
ENEMA (ML) RECTAL
COMMUNITY
End: 2019-01-10

## 2018-12-20 RX ORDER — MEDROXYPROGESTERONE ACETATE 150 MG/ML
1 INJECTION, SUSPENSION INTRAMUSCULAR
COMMUNITY
End: 2019-01-10

## 2018-12-20 RX ORDER — FLUTICASONE PROPIONATE 50 MCG
SPRAY, SUSPENSION (ML) NASAL
Status: ON HOLD | COMMUNITY
End: 2019-10-24

## 2018-12-20 RX ORDER — ACETAMINOPHEN 500 MG
TABLET ORAL
Status: ON HOLD | COMMUNITY
End: 2019-10-24

## 2018-12-20 RX ORDER — HYDROCODONE BITARTRATE AND ACETAMINOPHEN 7.5; 325 MG/15ML; MG/15ML
SOLUTION ORAL
Status: ON HOLD | COMMUNITY
End: 2019-10-24

## 2018-12-20 RX ORDER — CIPROFLOXACIN 500 MG/1
TABLET ORAL
COMMUNITY
Start: 2018-12-18 | End: 2019-01-10

## 2018-12-20 RX ORDER — CLINDAMYCIN PALMITATE HYDROCHLORIDE (PEDIATRIC) 75 MG/5ML
SOLUTION ORAL
Status: ON HOLD | COMMUNITY
End: 2019-10-24

## 2018-12-20 RX ORDER — QUINAPRIL 10 MG/1
TABLET ORAL
Status: ON HOLD | COMMUNITY
End: 2019-10-24

## 2018-12-20 RX ORDER — METFORMIN HYDROCHLORIDE 500 MG/1
TABLET ORAL
COMMUNITY
End: 2019-01-10

## 2018-12-20 RX ORDER — SERTRALINE HYDROCHLORIDE 50 MG/1
TABLET, FILM COATED ORAL
COMMUNITY
End: 2020-02-05

## 2018-12-20 RX ORDER — ONDANSETRON 4 MG/1
4 TABLET, ORALLY DISINTEGRATING ORAL EVERY 8 HOURS PRN
Qty: 10 TABLET | Refills: 1 | Status: SHIPPED | OUTPATIENT
Start: 2018-12-20 | End: 2018-12-27

## 2018-12-20 RX ORDER — LEVOTHYROXINE SODIUM 150 UG/1
TABLET ORAL
COMMUNITY
End: 2019-01-10

## 2018-12-20 RX ORDER — AMOXICILLIN 500 MG/1
CAPSULE ORAL
Status: ON HOLD | COMMUNITY
End: 2019-10-24 | Stop reason: CLARIF

## 2018-12-20 RX ORDER — NYSTATIN 100000 U/G
OINTMENT TOPICAL
COMMUNITY
End: 2020-02-05

## 2018-12-20 RX ORDER — BUDESONIDE 0.5 MG/2ML
INHALANT ORAL
Status: ON HOLD | COMMUNITY
End: 2019-10-24

## 2018-12-20 RX ORDER — CLOBETASOL PROPIONATE 0.5 MG/G
CREAM TOPICAL
Refills: 1 | COMMUNITY
Start: 2018-11-19 | End: 2020-02-05

## 2018-12-20 RX ORDER — TRAMADOL HYDROCHLORIDE 50 MG/1
TABLET ORAL
Status: ON HOLD | COMMUNITY
End: 2019-10-24

## 2018-12-20 RX ORDER — GUAIFENESIN 600 MG/1
TABLET, EXTENDED RELEASE ORAL
Status: ON HOLD | COMMUNITY
End: 2019-10-24

## 2018-12-20 RX ORDER — VALSARTAN 80 MG/1
TABLET ORAL
COMMUNITY
End: 2019-01-10

## 2018-12-20 RX ORDER — BETAMETHASONE DIPROPIONATE 0.5 MG/G
CREAM TOPICAL
Status: ON HOLD | COMMUNITY
End: 2019-10-24 | Stop reason: CLARIF

## 2018-12-20 RX ORDER — KETOCONAZOLE 20 MG/G
CREAM TOPICAL
Refills: 3 | COMMUNITY
Start: 2018-11-28 | End: 2020-02-05

## 2018-12-20 RX ORDER — DOXEPIN HYDROCHLORIDE 25 MG/1
CAPSULE ORAL
Refills: 3 | Status: ON HOLD | COMMUNITY
Start: 2018-11-02 | End: 2019-10-24

## 2018-12-20 RX ORDER — MINERAL OIL
180 ENEMA (ML) RECTAL DAILY
COMMUNITY

## 2018-12-20 RX ORDER — DOXYCYCLINE HYCLATE 100 MG
TABLET ORAL
Status: ON HOLD | COMMUNITY
End: 2019-10-24

## 2018-12-20 RX ORDER — OXYCODONE AND ACETAMINOPHEN 5; 325 MG/1; MG/1
1 TABLET ORAL EVERY 6 HOURS PRN
Qty: 20 TABLET | Refills: 0 | Status: ON HOLD | OUTPATIENT
Start: 2018-12-20 | End: 2019-10-24

## 2018-12-20 RX ORDER — DOXEPIN HYDROCHLORIDE 25 MG/1
CAPSULE ORAL
COMMUNITY
End: 2019-01-10

## 2018-12-20 RX ORDER — CLOBETASOL PROPIONATE 0.5 MG/G
CREAM TOPICAL
COMMUNITY
End: 2019-01-10

## 2018-12-20 RX ORDER — HYDROCHLOROTHIAZIDE 25 MG/1
TABLET ORAL
COMMUNITY
End: 2019-01-10

## 2018-12-20 RX ORDER — LEVOTHYROXINE SODIUM 100 UG/1
TABLET ORAL
COMMUNITY
End: 2019-01-10

## 2018-12-20 RX ORDER — SERTRALINE HYDROCHLORIDE 25 MG/1
TABLET, FILM COATED ORAL
COMMUNITY
End: 2019-01-10

## 2018-12-20 RX ORDER — PREDNISONE 20 MG/1
TABLET ORAL
Status: ON HOLD | COMMUNITY
End: 2019-10-24

## 2018-12-20 RX ORDER — SERTRALINE HYDROCHLORIDE 50 MG/1
50 TABLET, FILM COATED ORAL DAILY
Refills: 3 | Status: ON HOLD | COMMUNITY
Start: 2018-11-01 | End: 2019-10-24 | Stop reason: SDUPTHER

## 2018-12-20 RX ORDER — AMITRIPTYLINE HYDROCHLORIDE 25 MG/1
TABLET, FILM COATED ORAL
Status: ON HOLD | COMMUNITY
End: 2019-10-24 | Stop reason: CLARIF

## 2018-12-20 RX ORDER — CIPROFLOXACIN 500 MG/1
TABLET ORAL
Status: ON HOLD | COMMUNITY
End: 2019-10-24

## 2018-12-20 RX ORDER — KETOCONAZOLE 20 MG/ML
SHAMPOO, SUSPENSION TOPICAL
Status: ON HOLD | COMMUNITY
End: 2019-10-24

## 2018-12-20 RX ORDER — ESOMEPRAZOLE MAGNESIUM 40 MG/1
CAPSULE, DELAYED RELEASE ORAL
COMMUNITY
End: 2019-01-10

## 2018-12-20 RX ORDER — AMOXICILLIN 500 MG/1
TABLET, FILM COATED ORAL
COMMUNITY
End: 2019-01-10

## 2018-12-20 RX ORDER — LEVOTHYROXINE SODIUM 150 UG/1
150 TABLET ORAL DAILY
Refills: 3 | COMMUNITY
Start: 2018-10-11 | End: 2019-08-28 | Stop reason: SDUPTHER

## 2018-12-20 RX ORDER — MONTELUKAST SODIUM 10 MG/1
10 TABLET ORAL NIGHTLY
COMMUNITY

## 2018-12-20 RX ORDER — LORATADINE 10 MG/1
TABLET ORAL
Status: ON HOLD | COMMUNITY
End: 2019-10-24

## 2018-12-20 RX ORDER — PHENAZOPYRIDINE HYDROCHLORIDE 200 MG/1
TABLET, FILM COATED ORAL
Status: ON HOLD | COMMUNITY
End: 2019-10-24

## 2018-12-20 RX ORDER — TAMSULOSIN HYDROCHLORIDE 0.4 MG/1
0.4 CAPSULE ORAL
Qty: 30 CAPSULE | Refills: 1 | Status: ON HOLD | OUTPATIENT
Start: 2018-12-20 | End: 2019-10-24

## 2018-12-20 RX ORDER — FEXOFENADINE HCL AND PSEUDOEPHEDRINE HCI 60; 120 MG/1; MG/1
TABLET, EXTENDED RELEASE ORAL
Status: ON HOLD | COMMUNITY
End: 2019-10-24

## 2018-12-20 RX ORDER — KETOCONAZOLE 20 MG/G
CREAM TOPICAL
COMMUNITY
End: 2019-01-10

## 2018-12-21 NOTE — PROGRESS NOTES
HPI:  Ailyn Yanes is a 48 y.o. year old female that  presents with No chief complaint on file.  .  This patient comes for emergency room follow-up with right ureteral colic    Patient was having some right flank pain associated with nausea vomiting.  She brings in the disc which she states shows a right ureteral stone.  She states that she was told in the emergency room that it was small enough to pass and     Patient is still having some right lower quadrant pain.  No significant nausea vomiting now.  No fevers chills.    Patient currently on antibiotics empirically prescribed by her PCP    This is her 3rd stone episode.  Approximately 14 years ago she had what sounds like ureteroscopy and stone extraction.  Her 1st stone she was able to pass on her own    There is no family history of kidney or bladder cancer and patient has had no other urologic intervention    Chart review shows podiatry note from October showing ingrown toenail.  There are no x-rays or culture on the chart.  In 2017 GFR greater than 60      Past Medical History:   Diagnosis Date    Allergy     Hypertension     Kidney stone     Prediabetes     Thyroid disease      Social History     Socioeconomic History    Marital status:      Spouse name: Not on file    Number of children: Not on file    Years of education: Not on file    Highest education level: Not on file   Social Needs    Financial resource strain: Not on file    Food insecurity - worry: Not on file    Food insecurity - inability: Not on file    Transportation needs - medical: Not on file    Transportation needs - non-medical: Not on file   Occupational History    Not on file   Tobacco Use    Smoking status: Former Smoker     Last attempt to quit: 1993     Years since quittin.9    Smokeless tobacco: Never Used   Substance and Sexual Activity    Alcohol use: No    Drug use: Not on file    Sexual activity: Yes     Partners: Female   Other  "Topics Concern    Are you pregnant or think you may be? Not Asked    Breast-feeding Not Asked   Social History Narrative    Not on file     Past Surgical History:   Procedure Laterality Date     SECTION      KIDNEY STONE SURGERY Left 2004    REDUCTION-TURBINATE,13782 Bilateral 2015    Performed by [facility]un [facility] at [facility] OR [address] [place] [address]    SEPTOPLASTY, 94288 Bilateral 2015    Performed by [facility]un Luna MD at [facility] OR [address] [place] [address]    SINUS SURGERY FUNCTIONAL ENDOSCOPIC WITH NAVIGATION, 47741, 32222, 62814, 29397, 17447 Bilateral 2015    [facility]    TONSILLECTOMY      TONSILLECTOMY,03716 Bilateral 2015    Performed by [facility]un Luna [facility] at [facility] [place] [address] [place] [address]     Family History   Problem Relation Age of Onset    Hypertension Mother     Cancer Mother     Diabetes Mother     Diabetes Father     Hypertension Father     Hyperlipidemia Father     Heart disease Father     Diabetes Brother     Hypertension Brother     Heart disease Paternal Grandfather     Cancer Maternal Grandmother     Hypertension Maternal Grandmother     Heart disease Maternal Grandmother     Diabetes Maternal Grandfather     Heart disease Paternal Grandmother     Melanoma Neg Hx            Review of Systems  The patient has no chest pains.  The patient has no shortness of breath  Patient wears        glasses.  All other review of systems are negative.      Physical Exam:  /86 (BP Location: Right arm, Patient Position: Sitting)   Pulse 83   Temp 98.1 °F (36.7 °C)   Ht 5' 7" (1.702 m)   Wt 108.9 kg (240 lb)   BMI 37.59 kg/m²   General appearance: alert, cooperative, no distress  Constitutional:Oriented to person, place, and time.appears well-developed and well-nourished.   HEENT: Normocephalic, atraumatic, neck symmetrical, no nasal discharge   Eyes: conjunctivae/corneas clear, PERRL, EOM's intact  Lungs: clear to auscultation bilaterally, no dullness " to percussion bilaterally  Heart: regular rate and rhythm without rub; no displacement of the PMI   Abdomen: soft, non-tender; bowel sounds normoactive; no organomegaly  :  Urethral meatus without lesion, no urethral masses noted, bladder nontender /nondistended, vagina normal appearing,      no      cystocele, anus and perineum without lesions, no adnexal or uterine masses noted.  Extremities: extremities symmetric; no clubbing, cyanosis, or edema  Integument: Skin color, texture, turgor normal; no rashes; hair distrubution normal  Neurologic: Alert and oriented X 3, normal strength, normal coordination and gait  Psychiatric: no pressured speech; normal affect; no evidence of impaired cognition     LABS:    Complete Blood Count  Lab Results   Component Value Date    RBC 4.25 12/12/2017    HGB 12.4 12/12/2017    HCT 37.9 12/12/2017    MCV 89 12/12/2017    MCH 29.2 12/12/2017    MCHC 32.7 12/12/2017    RDW 13.2 12/12/2017     12/12/2017    MPV 10.1 12/12/2017    GRAN 8.3 (H) 12/12/2017    GRAN 68.9 12/12/2017    LYMPH 2.7 12/12/2017    LYMPH 22.7 12/12/2017    MONO 0.5 12/12/2017    MONO 4.5 12/12/2017    EOS 0.3 12/12/2017    BASO 0.09 12/12/2017    EOSINOPHIL 2.3 12/12/2017    BASOPHIL 0.8 12/12/2017    DIFFMETHOD Automated 12/12/2017       Comprehensive Metabolic Panel  Lab Results   Component Value Date     (H) 12/12/2017    BUN 8 12/12/2017    CREATININE 1.0 12/12/2017     12/12/2017    K 3.7 12/12/2017     12/12/2017    PROT 7.7 12/12/2017    ALBUMIN 3.6 12/12/2017    BILITOT 0.3 12/12/2017    AST 18 12/12/2017    ALKPHOS 128 12/12/2017    CO2 24 12/12/2017    ALT 25 12/12/2017    ANIONGAP 11 12/12/2017    EGFRNONAA >60.0 12/12/2017    ESTGFRAFRICA >60.0 12/12/2017       PSA  No results found for: PSA      Assessment:    ICD-10-CM ICD-9-CM    1. Nephrolithiasis N20.0 592.0 Urine culture      X-Ray Abdomen AP 1 View     The encounter diagnosis was Nephrolithiasis.      Plan:  1.   Nephrolithiasis.  Plan.  Prescription written for Flomax.  Patient encouraged increased fluid intake and strain her urine.  Will check KUB today.  Patient's urine will be cultured and once results are available ,we will contact the patient if antibiotics are indicated.  I discussed with the patient that if the patient develops intractable pain or spiking fever, then the patient should go to the emergency room for evaluation. The patient voices understanding.  Follow-up 2 weeks    I will bring the disc down to Radiology and review with radiologist  Orders Placed This Encounter   Procedures    Urine culture    X-Ray Abdomen AP 1 View           Cuong Gregory MD    PLEASE NOTE:  Please be advised that portions of this note were dictated using voice recognition software and may contain dictation related errors in spelling/grammar/appropriate pronouns/syntax or other errors that might have not been found and or corrected on text review.

## 2018-12-22 LAB — BACTERIA UR CULT: NO GROWTH

## 2018-12-24 ENCOUNTER — TELEPHONE (OUTPATIENT)
Dept: UROLOGY | Facility: CLINIC | Age: 48
End: 2018-12-24

## 2018-12-24 NOTE — TELEPHONE ENCOUNTER
----- Message from Pepper Clark MD sent at 12/24/2018  8:05 AM CST -----  I am checking messages on behalf of Dr. Gregory who is out of the office.     Please let patient know the urine culture did not demonstrate a urinary tract infection.

## 2019-01-03 ENCOUNTER — OFFICE VISIT (OUTPATIENT)
Dept: UROLOGY | Facility: CLINIC | Age: 49
End: 2019-01-03
Payer: COMMERCIAL

## 2019-01-03 VITALS
DIASTOLIC BLOOD PRESSURE: 84 MMHG | SYSTOLIC BLOOD PRESSURE: 142 MMHG | WEIGHT: 240 LBS | TEMPERATURE: 99 F | HEIGHT: 67 IN | BODY MASS INDEX: 37.67 KG/M2 | HEART RATE: 69 BPM

## 2019-01-03 DIAGNOSIS — I10 ESSENTIAL HYPERTENSION: ICD-10-CM

## 2019-01-03 DIAGNOSIS — N20.0 NEPHROLITHIASIS: Primary | ICD-10-CM

## 2019-01-03 PROCEDURE — 3008F PR BODY MASS INDEX (BMI) DOCUMENTED: ICD-10-PCS | Mod: CPTII,S$GLB,, | Performed by: UROLOGY

## 2019-01-03 PROCEDURE — 3077F PR MOST RECENT SYSTOLIC BLOOD PRESSURE >= 140 MM HG: ICD-10-PCS | Mod: CPTII,S$GLB,, | Performed by: UROLOGY

## 2019-01-03 PROCEDURE — 3079F PR MOST RECENT DIASTOLIC BLOOD PRESSURE 80-89 MM HG: ICD-10-PCS | Mod: CPTII,S$GLB,, | Performed by: UROLOGY

## 2019-01-03 PROCEDURE — 99999 PR PBB SHADOW E&M-EST. PATIENT-LVL III: CPT | Mod: PBBFAC,,, | Performed by: UROLOGY

## 2019-01-03 PROCEDURE — 3008F BODY MASS INDEX DOCD: CPT | Mod: CPTII,S$GLB,, | Performed by: UROLOGY

## 2019-01-03 PROCEDURE — 99214 PR OFFICE/OUTPT VISIT, EST, LEVL IV, 30-39 MIN: ICD-10-PCS | Mod: S$GLB,,, | Performed by: UROLOGY

## 2019-01-03 PROCEDURE — 3077F SYST BP >= 140 MM HG: CPT | Mod: CPTII,S$GLB,, | Performed by: UROLOGY

## 2019-01-03 PROCEDURE — 3079F DIAST BP 80-89 MM HG: CPT | Mod: CPTII,S$GLB,, | Performed by: UROLOGY

## 2019-01-03 PROCEDURE — 99214 OFFICE O/P EST MOD 30 MIN: CPT | Mod: S$GLB,,, | Performed by: UROLOGY

## 2019-01-03 PROCEDURE — 99999 PR PBB SHADOW E&M-EST. PATIENT-LVL III: ICD-10-PCS | Mod: PBBFAC,,, | Performed by: UROLOGY

## 2019-01-03 NOTE — PROGRESS NOTES
"This patient was last seen by me December 20, 2018 for emergency room follow-up for right ureteral stone.  Urine culture from that visit was negative in KUB did not visualize the stone.    Outside disc from CT scan shows tiny distal right ureteral stone with no other stones present.    Patient is not need to use pain pills.  She does notice some suprapubic pressure and urinary frequency    Physical exam reveals reveals a well-developed well-nourished patient  in no acute distress.  Patient is alert and oriented ×3 with normal mood and affect.  Respiratory effort is normal and there is no peripheral edema.  Skin is normal to inspection and palpation    BP (!) 142/84 (BP Location: Right arm, Patient Position: Sitting)   Pulse 69   Temp 98.8 °F (37.1 °C)   Ht 5' 7" (1.702 m)   Wt 108.9 kg (240 lb)   BMI 37.59 kg/m²   Review of Systems  General ROS: negative for chills, fever or weight loss  Respiratory ROS: no cough, shortness of breath, or wheezing  Cardiovascular ROS: no chest pain or dyspnea on exertion  Musculoskeletal ROS: negative for gait disturbance or muscular weakness    Family History   Problem Relation Age of Onset    Hypertension Mother     Cancer Mother     Diabetes Mother     Diabetes Father     Hypertension Father     Hyperlipidemia Father     Heart disease Father     Diabetes Brother     Hypertension Brother     Heart disease Paternal Grandfather     Cancer Maternal Grandmother     Hypertension Maternal Grandmother     Heart disease Maternal Grandmother     Diabetes Maternal Grandfather     Heart disease Paternal Grandmother     Melanoma Neg Hx      Past Medical History:   Diagnosis Date    Allergy     Hypertension     Kidney stone     Prediabetes     Thyroid disease      Family History   Problem Relation Age of Onset    Hypertension Mother     Cancer Mother     Diabetes Mother     Diabetes Father     Hypertension Father     Hyperlipidemia Father     Heart disease " Father     Diabetes Brother     Hypertension Brother     Heart disease Paternal Grandfather     Cancer Maternal Grandmother     Hypertension Maternal Grandmother     Heart disease Maternal Grandmother     Diabetes Maternal Grandfather     Heart disease Paternal Grandmother     Melanoma Neg Hx      Social History     Tobacco Use    Smoking status: Former Smoker     Last attempt to quit: 1993     Years since quittin.0    Smokeless tobacco: Never Used   Substance Use Topics    Alcohol use: No       Impression:      ICD-10-CM ICD-9-CM    1. Nephrolithiasis N20.0 592.0    2. Essential hypertension I10 401.9        Plan:    #1.  Nephrolithiasis.  Plan.  Options of management again discussed.  I discussed that her symptoms suggest that the stone is about to pass.  I recommend continue medical expulsive therapy.  I discussed with the patient that if the patient develops intractable pain or spiking fever, then the patient should go to the emergency room for evaluation. The patient voices understanding.  Follow-up 1 week for recheck.  If stone has not passed, we will then proceed with scheduling ureteroscopy and stone extraction with laser lithotripsy    2.   Elevated blood pressure.  Plan.  This finding pointed out to the patient.  I recommend that the patient follow up with the patient's PCP for this.    PLEASE NOTE:  Please be advised that portions of this note were dictated using voice recognition software and may contain dictation related errors in spelling/grammar/appropriate pronouns/syntax or other errors that might have not been found and or corrected on text review.

## 2019-01-10 ENCOUNTER — OFFICE VISIT (OUTPATIENT)
Dept: UROLOGY | Facility: CLINIC | Age: 49
End: 2019-01-10
Payer: COMMERCIAL

## 2019-01-10 VITALS
DIASTOLIC BLOOD PRESSURE: 89 MMHG | BODY MASS INDEX: 37.67 KG/M2 | WEIGHT: 240 LBS | HEART RATE: 96 BPM | HEIGHT: 67 IN | TEMPERATURE: 99 F | SYSTOLIC BLOOD PRESSURE: 154 MMHG

## 2019-01-10 DIAGNOSIS — N20.0 NEPHROLITHIASIS: Primary | ICD-10-CM

## 2019-01-10 DIAGNOSIS — I10 ESSENTIAL HYPERTENSION: ICD-10-CM

## 2019-01-10 PROCEDURE — 99214 PR OFFICE/OUTPT VISIT, EST, LEVL IV, 30-39 MIN: ICD-10-PCS | Mod: S$GLB,,, | Performed by: UROLOGY

## 2019-01-10 PROCEDURE — 3077F PR MOST RECENT SYSTOLIC BLOOD PRESSURE >= 140 MM HG: ICD-10-PCS | Mod: CPTII,S$GLB,, | Performed by: UROLOGY

## 2019-01-10 PROCEDURE — 99999 PR PBB SHADOW E&M-EST. PATIENT-LVL III: ICD-10-PCS | Mod: PBBFAC,,, | Performed by: UROLOGY

## 2019-01-10 PROCEDURE — 3079F PR MOST RECENT DIASTOLIC BLOOD PRESSURE 80-89 MM HG: ICD-10-PCS | Mod: CPTII,S$GLB,, | Performed by: UROLOGY

## 2019-01-10 PROCEDURE — 99999 PR PBB SHADOW E&M-EST. PATIENT-LVL III: CPT | Mod: PBBFAC,,, | Performed by: UROLOGY

## 2019-01-10 PROCEDURE — 3008F BODY MASS INDEX DOCD: CPT | Mod: CPTII,S$GLB,, | Performed by: UROLOGY

## 2019-01-10 PROCEDURE — 99214 OFFICE O/P EST MOD 30 MIN: CPT | Mod: S$GLB,,, | Performed by: UROLOGY

## 2019-01-10 PROCEDURE — 3079F DIAST BP 80-89 MM HG: CPT | Mod: CPTII,S$GLB,, | Performed by: UROLOGY

## 2019-01-10 PROCEDURE — 3077F SYST BP >= 140 MM HG: CPT | Mod: CPTII,S$GLB,, | Performed by: UROLOGY

## 2019-01-10 PROCEDURE — 3008F PR BODY MASS INDEX (BMI) DOCUMENTED: ICD-10-PCS | Mod: CPTII,S$GLB,, | Performed by: UROLOGY

## 2019-01-10 NOTE — PROGRESS NOTES
"This patient was last seen by me week ago in follow-up for tiny distal right ureteral stone which is unable be seen on KUB    She now comes in follow-up and has not needed to use pain medication.  She has no nausea vomiting.  She does have occasional discomfort and her right side    Physical exam reveals reveals a well-developed well-nourished patient  in no acute distress.  Patient is alert and oriented ×3 with normal mood and affect.  Respiratory effort is normal and there is no peripheral edema.  Skin is normal to inspection and palpation    BP (!) 154/89 (BP Location: Right arm, Patient Position: Sitting)   Pulse 96   Temp 98.8 °F (37.1 °C)   Ht 5' 7" (1.702 m)   Wt 108.9 kg (240 lb)   BMI 37.59 kg/m²   Review of Systems  General ROS: negative for chills, fever or weight loss  Respiratory ROS: no cough, shortness of breath, or wheezing  Cardiovascular ROS: no chest pain or dyspnea on exertion  Musculoskeletal ROS: negative for gait disturbance or muscular weakness    Family History   Problem Relation Age of Onset    Hypertension Mother     Cancer Mother     Diabetes Mother     Diabetes Father     Hypertension Father     Hyperlipidemia Father     Heart disease Father     Diabetes Brother     Hypertension Brother     Heart disease Paternal Grandfather     Cancer Maternal Grandmother     Hypertension Maternal Grandmother     Heart disease Maternal Grandmother     Diabetes Maternal Grandfather     Heart disease Paternal Grandmother     Melanoma Neg Hx      Past Medical History:   Diagnosis Date    Allergy     Hypertension     Kidney stone     Prediabetes     Thyroid disease      Family History   Problem Relation Age of Onset    Hypertension Mother     Cancer Mother     Diabetes Mother     Diabetes Father     Hypertension Father     Hyperlipidemia Father     Heart disease Father     Diabetes Brother     Hypertension Brother     Heart disease Paternal Grandfather     Cancer " Maternal Grandmother     Hypertension Maternal Grandmother     Heart disease Maternal Grandmother     Diabetes Maternal Grandfather     Heart disease Paternal Grandmother     Melanoma Neg Hx      Social History     Tobacco Use    Smoking status: Former Smoker     Last attempt to quit: 1993     Years since quittin.0    Smokeless tobacco: Never Used   Substance Use Topics    Alcohol use: No       Impression:      ICD-10-CM ICD-9-CM    1. Nephrolithiasis N20.0 592.0 CT Renal Stone Study ABD Pelvis WO   2. Essential hypertension I10 401.9        Plan:    #1.  Nephrolithiasis.  Plan.  Is my impression that most likely her stone has passed and the symptoms are not related to a stone.  I offered to proceed with ureteroscopy and stone extraction verses repeating stone protocol CT.  Patient wants to repeat stone protocol CT which I think reasonable.  We will call patient with results    2.   Elevated blood pressure.  Plan.  This finding pointed out to the patient.  I recommend that the patient follow up with the patient's PCP for this.    PLEASE NOTE:  Please be advised that portions of this note were dictated using voice recognition software and may contain dictation related errors in spelling/grammar/appropriate pronouns/syntax or other errors that might have not been found and or corrected on text review.

## 2019-01-14 ENCOUNTER — HOSPITAL ENCOUNTER (OUTPATIENT)
Dept: RADIOLOGY | Facility: HOSPITAL | Age: 49
Discharge: HOME | End: 2019-01-14
Attending: UROLOGY
Payer: COMMERCIAL

## 2019-01-14 ENCOUNTER — TELEPHONE (OUTPATIENT)
Dept: UROLOGY | Facility: CLINIC | Age: 49
End: 2019-01-14

## 2019-01-14 DIAGNOSIS — N20.0 NEPHROLITHIASIS: ICD-10-CM

## 2019-01-14 PROCEDURE — 74176 CT ABD & PELVIS W/O CONTRAST: CPT | Mod: TC

## 2019-01-14 PROCEDURE — 74176 CT ABD & PELVIS W/O CONTRAST: CPT | Mod: 26,,, | Performed by: RADIOLOGY

## 2019-01-14 PROCEDURE — 74176 CT RENAL STONE STUDY ABD PELVIS WO: ICD-10-PCS | Mod: 26,,, | Performed by: RADIOLOGY

## 2019-01-14 NOTE — TELEPHONE ENCOUNTER
Spoke with Ms Calderon about passing her stone and informed her that there are no other stones present, she voiced her understanding.

## 2019-01-14 NOTE — TELEPHONE ENCOUNTER
----- Message from Cuong Gregory MD sent at 1/14/2019  1:02 PM CST -----  Please contact the patient and let her know the stone has passed and there are no other stones present.

## 2019-05-30 ENCOUNTER — OFFICE VISIT (OUTPATIENT)
Dept: ORTHOPEDICS | Facility: CLINIC | Age: 49
End: 2019-05-30
Payer: COMMERCIAL

## 2019-05-30 ENCOUNTER — HOSPITAL ENCOUNTER (OUTPATIENT)
Dept: RADIOLOGY | Facility: HOSPITAL | Age: 49
Discharge: HOME | End: 2019-05-30
Attending: PHYSICIAN ASSISTANT
Payer: COMMERCIAL

## 2019-05-30 DIAGNOSIS — M17.11 PRIMARY OSTEOARTHRITIS OF RIGHT KNEE: Primary | ICD-10-CM

## 2019-05-30 DIAGNOSIS — M25.561 RIGHT KNEE PAIN, UNSPECIFIED CHRONICITY: Primary | ICD-10-CM

## 2019-05-30 DIAGNOSIS — M25.561 RIGHT KNEE PAIN, UNSPECIFIED CHRONICITY: ICD-10-CM

## 2019-05-30 PROCEDURE — 20610 PR DRAIN/INJECT LARGE JOINT/BURSA: ICD-10-PCS | Mod: RT,S$GLB,, | Performed by: PHYSICIAN ASSISTANT

## 2019-05-30 PROCEDURE — 73562 X-RAY EXAM OF KNEE 3: CPT | Mod: 26,59,RT, | Performed by: RADIOLOGY

## 2019-05-30 PROCEDURE — 99213 PR OFFICE/OUTPT VISIT, EST, LEVL III, 20-29 MIN: ICD-10-PCS | Mod: 25,S$GLB,, | Performed by: PHYSICIAN ASSISTANT

## 2019-05-30 PROCEDURE — 73562 XR KNEE ORTHO RIGHT WITH FLEXION: ICD-10-PCS | Mod: 26,59,RT, | Performed by: RADIOLOGY

## 2019-05-30 PROCEDURE — 73562 X-RAY EXAM OF KNEE 3: CPT | Mod: TC,RT

## 2019-05-30 PROCEDURE — 99213 OFFICE O/P EST LOW 20 MIN: CPT | Mod: 25,S$GLB,, | Performed by: PHYSICIAN ASSISTANT

## 2019-05-30 PROCEDURE — 99999 PR PBB SHADOW E&M-EST. PATIENT-LVL III: ICD-10-PCS | Mod: PBBFAC,,, | Performed by: PHYSICIAN ASSISTANT

## 2019-05-30 PROCEDURE — 99999 PR PBB SHADOW E&M-EST. PATIENT-LVL III: CPT | Mod: PBBFAC,,, | Performed by: PHYSICIAN ASSISTANT

## 2019-05-30 PROCEDURE — 73564 X-RAY EXAM KNEE 4 OR MORE: CPT | Mod: 26,RT,, | Performed by: RADIOLOGY

## 2019-05-30 PROCEDURE — 20610 DRAIN/INJ JOINT/BURSA W/O US: CPT | Mod: RT,S$GLB,, | Performed by: PHYSICIAN ASSISTANT

## 2019-05-30 PROCEDURE — 73564 XR KNEE ORTHO RIGHT WITH FLEXION: ICD-10-PCS | Mod: 26,RT,, | Performed by: RADIOLOGY

## 2019-05-30 RX ORDER — METHYLPREDNISOLONE ACETATE 80 MG/ML
80 INJECTION, SUSPENSION INTRA-ARTICULAR; INTRALESIONAL; INTRAMUSCULAR; SOFT TISSUE
Status: COMPLETED | OUTPATIENT
Start: 2019-05-30 | End: 2019-05-30

## 2019-05-30 RX ADMIN — METHYLPREDNISOLONE ACETATE 80 MG: 80 INJECTION, SUSPENSION INTRA-ARTICULAR; INTRALESIONAL; INTRAMUSCULAR; SOFT TISSUE at 05:05

## 2019-05-30 NOTE — PROGRESS NOTES
Subjective:      Patient ID: Ailyn Yanes is a 48 y.o. female.    Chief Complaint: No chief complaint on file.    HPI  48 year old female presents with chief complaint of right knee pain x 2 months. No trauma. She has h/o chondromalacia patella. She had cortisone injection 1 year ago with good relief. Pain is medial and constant. She cannot take nsaids due to allergy. She reports swelling, popping, locking, and catching. She does not use assistive devices.   Review of Systems   Constitution: Negative for chills, fever and night sweats.   Cardiovascular: Negative for chest pain.   Respiratory: Negative for cough and shortness of breath.    Hematologic/Lymphatic: Does not bruise/bleed easily.   Skin: Negative for color change.   Gastrointestinal: Negative for heartburn.   Genitourinary: Negative for dysuria.   Neurological: Negative for numbness and paresthesias.   Psychiatric/Behavioral: Negative for altered mental status.   Allergic/Immunologic: Negative for persistent infections.         Objective:            Ortho/SPM Exam  General :   alert, appears stated age and cooperative   Gait: Normal. The patient can bear weight on the injured extremity.   Right Lower Extremity  Hip Palpation:  no tenderness over the greater  trochanter   Hip ROM: 100% of normal    Knee Effusion:  None.   Ecchymosis:  none   Knee ROM:  0 to 115 degrees with subpatellar   crepitance.   Patella:  Patella does track normally.  Patellar apprehension test: negative  Patellar compression test: negative   Tenderness: medial joint line and lateral joint line   Stability:  Lachman's test: negative  Posterior drawer: negative  Medial collateral ligament: negative  Lateral collateral ligament: negative         Jay's Test:  Mild pain   Sensation:   intact to light touch   Pulses: normal DP and PT pulses       X-ray: ordered and reviewed by myself. Mild DJD.  No fracture or dislocation.  No bone destruction identified.          Assessment:        Encounter Diagnosis   Name Primary?    Primary osteoarthritis of right knee Yes          Plan:       Discussed treatment options with patient. She would like to try another injection. If pain does not improve, will consider further imaging. RTC prn.     PROCEDURE:  I have explained the risks, benefits, and alternatives of the procedure in detail.  The patient voices understanding and all questions have been answered.  The patient agrees to proceed as planned. So after I performed a sterile prep of the skin in the normal fashion the right knee is injected using a 22 gauge needle from the anteromedial approach with a combination of 4cc 1% plain lidocaine and 80 mg of depo medrol.  The patient is cautioned and immediate relief of pain is secondary to the local anesthetic and will be temporary.  After the anesthetic wears off there may be a increase in pain that may last for a few hours or a few days and they should use ice to help alleviate this flair up of pain.

## 2019-06-20 ENCOUNTER — OFFICE VISIT (OUTPATIENT)
Dept: ORTHOPEDICS | Facility: CLINIC | Age: 49
End: 2019-06-20
Payer: COMMERCIAL

## 2019-06-20 VITALS — HEIGHT: 67 IN | WEIGHT: 244.81 LBS | BODY MASS INDEX: 38.42 KG/M2

## 2019-06-20 DIAGNOSIS — M25.561 RIGHT KNEE PAIN, UNSPECIFIED CHRONICITY: Primary | ICD-10-CM

## 2019-06-20 PROCEDURE — 99213 OFFICE O/P EST LOW 20 MIN: CPT | Mod: S$GLB,,, | Performed by: PHYSICIAN ASSISTANT

## 2019-06-20 PROCEDURE — 99999 PR PBB SHADOW E&M-EST. PATIENT-LVL V: CPT | Mod: PBBFAC,,, | Performed by: PHYSICIAN ASSISTANT

## 2019-06-20 PROCEDURE — 3008F BODY MASS INDEX DOCD: CPT | Mod: CPTII,S$GLB,, | Performed by: PHYSICIAN ASSISTANT

## 2019-06-20 PROCEDURE — 99213 PR OFFICE/OUTPT VISIT, EST, LEVL III, 20-29 MIN: ICD-10-PCS | Mod: S$GLB,,, | Performed by: PHYSICIAN ASSISTANT

## 2019-06-20 PROCEDURE — 99999 PR PBB SHADOW E&M-EST. PATIENT-LVL V: ICD-10-PCS | Mod: PBBFAC,,, | Performed by: PHYSICIAN ASSISTANT

## 2019-06-20 PROCEDURE — 3008F PR BODY MASS INDEX (BMI) DOCUMENTED: ICD-10-PCS | Mod: CPTII,S$GLB,, | Performed by: PHYSICIAN ASSISTANT

## 2019-06-21 NOTE — PROGRESS NOTES
Subjective:      Patient ID: Ailyn Yanes is a 48 y.o. female.    Chief Complaint: Pain of the Right Knee    HPI  Patient returns for f/u regarding her right knee pain. She was seen 3 weeks ago and given a cortisone injection. It did not help. She reports anterolateral knee pain as well as swelling, popping, locking, and catching.   Review of Systems   Constitution: Negative for chills, fever and night sweats.   Cardiovascular: Negative for chest pain.   Respiratory: Negative for cough and shortness of breath.    Hematologic/Lymphatic: Does not bruise/bleed easily.   Skin: Negative for color change.   Gastrointestinal: Negative for heartburn.   Genitourinary: Negative for dysuria.   Neurological: Negative for numbness and paresthesias.   Psychiatric/Behavioral: Negative for altered mental status.   Allergic/Immunologic: Negative for persistent infections.         Objective:            Ortho/SPM Exam    General :   alert, appears stated age and cooperative   Gait: Normal. The patient can bear weight on the injured extremity.   Right Lower Extremity  Hip Palpation:  no tenderness over the greater  trochanter   Hip ROM: 100% of normal    Knee Effusion:  None.   Ecchymosis:  none   Knee ROM:  0 to 120 degrees with subpatellar   crepitance.   Patella:  Patella does track normally.  Patellar apprehension test: negative  Patellar compression test: negative   Tenderness: lateral joint line   Stability:  Lachman's test: negative  Posterior drawer: negative  Medial collateral ligament: negative  Lateral collateral ligament: negative           Jay's Test:  Mild pain   Sensation:   intact to light touch   Pulses: normal DP and PT pulses         X-ray: reviewed by myself. No fx or dislocation.       Assessment:       Encounter Diagnosis   Name Primary?    Right knee pain, unspecified chronicity Yes          Plan:       MRI was ordered to r/o meniscus tear. RTC pending results.

## 2019-07-01 ENCOUNTER — HOSPITAL ENCOUNTER (OUTPATIENT)
Dept: RADIOLOGY | Facility: HOSPITAL | Age: 49
Discharge: HOME | End: 2019-07-01
Attending: PHYSICIAN ASSISTANT
Payer: COMMERCIAL

## 2019-07-01 DIAGNOSIS — M25.561 RIGHT KNEE PAIN, UNSPECIFIED CHRONICITY: ICD-10-CM

## 2019-07-01 DIAGNOSIS — M17.11 PRIMARY OSTEOARTHRITIS OF RIGHT KNEE: Primary | ICD-10-CM

## 2019-07-01 DIAGNOSIS — M22.41 CHONDROMALACIA OF RIGHT PATELLA: Primary | ICD-10-CM

## 2019-07-01 PROCEDURE — 73721 MRI JNT OF LWR EXTRE W/O DYE: CPT | Mod: 26,RT,, | Performed by: RADIOLOGY

## 2019-07-01 PROCEDURE — 73721 MRI KNEE WITHOUT CONTRAST RIGHT: ICD-10-PCS | Mod: 26,RT,, | Performed by: RADIOLOGY

## 2019-07-01 PROCEDURE — 73721 MRI JNT OF LWR EXTRE W/O DYE: CPT | Mod: TC,RT

## 2019-07-01 NOTE — PROGRESS NOTES
Spoke to patient regarding MRI results. Cortisone injection did not help. She wants to try viscosupplementation. Order placed for euflexxa. She also wants to try PT and order was sent to [facility] in [place]. She will f/u in 2 weeks for first euflexxa injection pending insurance approval.

## 2019-07-16 ENCOUNTER — OFFICE VISIT (OUTPATIENT)
Dept: ORTHOPEDICS | Facility: CLINIC | Age: 49
End: 2019-07-16
Payer: COMMERCIAL

## 2019-07-16 VITALS
HEIGHT: 67 IN | BODY MASS INDEX: 37.56 KG/M2 | DIASTOLIC BLOOD PRESSURE: 83 MMHG | HEART RATE: 74 BPM | SYSTOLIC BLOOD PRESSURE: 133 MMHG | WEIGHT: 239.31 LBS

## 2019-07-16 DIAGNOSIS — M17.11 PRIMARY OSTEOARTHRITIS OF RIGHT KNEE: Primary | ICD-10-CM

## 2019-07-16 PROCEDURE — 99499 NO LOS: ICD-10-PCS | Mod: S$GLB,,, | Performed by: PHYSICIAN ASSISTANT

## 2019-07-16 PROCEDURE — 99999 PR PBB SHADOW E&M-EST. PATIENT-LVL V: CPT | Mod: PBBFAC,,, | Performed by: PHYSICIAN ASSISTANT

## 2019-07-16 PROCEDURE — 20610 PR DRAIN/INJECT LARGE JOINT/BURSA: ICD-10-PCS | Mod: RT,S$GLB,, | Performed by: PHYSICIAN ASSISTANT

## 2019-07-16 PROCEDURE — 99499 UNLISTED E&M SERVICE: CPT | Mod: S$GLB,,, | Performed by: PHYSICIAN ASSISTANT

## 2019-07-16 PROCEDURE — 99999 PR PBB SHADOW E&M-EST. PATIENT-LVL V: ICD-10-PCS | Mod: PBBFAC,,, | Performed by: PHYSICIAN ASSISTANT

## 2019-07-16 PROCEDURE — 20610 DRAIN/INJ JOINT/BURSA W/O US: CPT | Mod: RT,S$GLB,, | Performed by: PHYSICIAN ASSISTANT

## 2019-07-16 NOTE — PROGRESS NOTES
Subjective:      Patient ID: Ailyn Yanes is a 48 y.o. female.    Chief Complaint: Pain of the Right Knee    HPI  Patient returns for the first of three Euflexxa injections right knee.    Review of Systems   Constitution: Negative for chills, fever and night sweats.   Cardiovascular: Negative for chest pain.   Respiratory: Negative for cough and shortness of breath.    Hematologic/Lymphatic: Does not bruise/bleed easily.   Skin: Negative for color change.   Gastrointestinal: Negative for heartburn.   Genitourinary: Negative for dysuria.   Neurological: Negative for numbness and paresthesias.   Psychiatric/Behavioral: Negative for altered mental status.   Allergic/Immunologic: Negative for persistent infections.         Objective:            Ortho/SPM Exam  The right knee is examined, there is no evidence of swelling or effusion.  There is no evidence of erythema. Skin, pulses, sensation are intact.            Assessment:       Encounter Diagnosis   Name Primary?    Primary osteoarthritis of right knee Yes          Plan:       PROCEDURE:  I have explained the risks, benefits, and alternatives of the procedure in detail.  The patient voices understanding and all questions have been answered.  The patient agrees to proceed as planned. So after I performed a sterile prep of the skin in the normal fashion the right knee is injected using a 22 gauge needle from the anterolateral approach with 2cc of euflexxa solution. The patient is reminded that it can take 6 - 8 weeks to see all the affects of this treatment, they must complete all three injections to see all the affects and the treatment can not be repeated any earlier than six months.

## 2019-07-23 ENCOUNTER — OFFICE VISIT (OUTPATIENT)
Dept: ORTHOPEDICS | Facility: CLINIC | Age: 49
End: 2019-07-23
Payer: COMMERCIAL

## 2019-07-23 VITALS
HEART RATE: 66 BPM | WEIGHT: 243.63 LBS | BODY MASS INDEX: 38.24 KG/M2 | HEIGHT: 67 IN | DIASTOLIC BLOOD PRESSURE: 82 MMHG | SYSTOLIC BLOOD PRESSURE: 126 MMHG

## 2019-07-23 DIAGNOSIS — M17.11 PRIMARY OSTEOARTHRITIS OF RIGHT KNEE: Primary | ICD-10-CM

## 2019-07-23 PROCEDURE — 20610 DRAIN/INJ JOINT/BURSA W/O US: CPT | Mod: RT,S$GLB,, | Performed by: PHYSICIAN ASSISTANT

## 2019-07-23 PROCEDURE — 99499 UNLISTED E&M SERVICE: CPT | Mod: S$GLB,,, | Performed by: PHYSICIAN ASSISTANT

## 2019-07-23 PROCEDURE — 99999 PR PBB SHADOW E&M-EST. PATIENT-LVL V: CPT | Mod: PBBFAC,,, | Performed by: PHYSICIAN ASSISTANT

## 2019-07-23 PROCEDURE — 99499 NO LOS: ICD-10-PCS | Mod: S$GLB,,, | Performed by: PHYSICIAN ASSISTANT

## 2019-07-23 PROCEDURE — 99999 PR PBB SHADOW E&M-EST. PATIENT-LVL V: ICD-10-PCS | Mod: PBBFAC,,, | Performed by: PHYSICIAN ASSISTANT

## 2019-07-23 PROCEDURE — 20610 PR DRAIN/INJECT LARGE JOINT/BURSA: ICD-10-PCS | Mod: RT,S$GLB,, | Performed by: PHYSICIAN ASSISTANT

## 2019-07-23 NOTE — PROGRESS NOTES
Subjective:      Patient ID: Ailyn Yanes is a 48 y.o. female.    Chief Complaint: Injections of the Right Knee    HPI  Patient returns for the second of three. The patient tolerated the last injection well. The patient reports the Euflexxa injection in the right knee(s) has brought about little improvement..    Review of Systems   Constitution: Negative for chills, fever and night sweats.   Cardiovascular: Negative for chest pain.   Respiratory: Negative for cough and shortness of breath.    Hematologic/Lymphatic: Does not bruise/bleed easily.   Skin: Negative for color change.   Gastrointestinal: Negative for heartburn.   Genitourinary: Negative for dysuria.   Neurological: Negative for numbness and paresthesias.   Psychiatric/Behavioral: Negative for altered mental status.   Allergic/Immunologic: Negative for persistent infections.         Objective:            Ortho/SPM Exam  The right knee is examined, there is no evidence of swelling or effusion.  There is no evidence of erythema. Skin, pulses, sensation are intact.            Assessment:       Encounter Diagnosis   Name Primary?    Primary osteoarthritis of right knee Yes          Plan:       PROCEDURE:  I have explained the risks, benefits, and alternatives of the procedure in detail.  The patient voices understanding and all questions have been answered.  The patient agrees to proceed as planned. So after I performed a sterile prep of the skin in the normal fashion the right knee is injected using a 22 gauge needle from the anterolateral approach with 2cc of euflexxa solution. The patient is reminded that it can take 6 - 8 weeks to see all the affects of this treatment, they must complete all three injections to see all the affects and the treatment can not be repeated any earlier than six months.

## 2019-07-30 ENCOUNTER — OFFICE VISIT (OUTPATIENT)
Dept: ORTHOPEDICS | Facility: CLINIC | Age: 49
End: 2019-07-30
Payer: COMMERCIAL

## 2019-07-30 VITALS
SYSTOLIC BLOOD PRESSURE: 138 MMHG | WEIGHT: 243 LBS | BODY MASS INDEX: 38.14 KG/M2 | HEART RATE: 70 BPM | HEIGHT: 67 IN | DIASTOLIC BLOOD PRESSURE: 79 MMHG

## 2019-07-30 DIAGNOSIS — M17.11 PRIMARY OSTEOARTHRITIS OF RIGHT KNEE: Primary | ICD-10-CM

## 2019-07-30 PROCEDURE — 20610 PR DRAIN/INJECT LARGE JOINT/BURSA: ICD-10-PCS | Mod: RT,S$GLB,, | Performed by: PHYSICIAN ASSISTANT

## 2019-07-30 PROCEDURE — 99999 PR PBB SHADOW E&M-EST. PATIENT-LVL V: CPT | Mod: PBBFAC,,, | Performed by: PHYSICIAN ASSISTANT

## 2019-07-30 PROCEDURE — 20610 DRAIN/INJ JOINT/BURSA W/O US: CPT | Mod: RT,S$GLB,, | Performed by: PHYSICIAN ASSISTANT

## 2019-07-30 PROCEDURE — 99499 UNLISTED E&M SERVICE: CPT | Mod: S$GLB,,, | Performed by: PHYSICIAN ASSISTANT

## 2019-07-30 PROCEDURE — 99499 NO LOS: ICD-10-PCS | Mod: S$GLB,,, | Performed by: PHYSICIAN ASSISTANT

## 2019-07-30 PROCEDURE — 99999 PR PBB SHADOW E&M-EST. PATIENT-LVL V: ICD-10-PCS | Mod: PBBFAC,,, | Performed by: PHYSICIAN ASSISTANT

## 2019-07-30 NOTE — PROGRESS NOTES
Subjective:      Patient ID: Ailyn Yanes is a 48 y.o. female.    Chief Complaint: No chief complaint on file.    HPI  Patient returns for the third of three. The patient tolerated the last injection well. The patient reports the Euflexxa injection in the right knee(s) has brought about no improvement..    Review of Systems   Constitution: Negative for chills, fever and night sweats.   Cardiovascular: Negative for chest pain.   Respiratory: Negative for cough and shortness of breath.    Hematologic/Lymphatic: Does not bruise/bleed easily.   Skin: Negative for color change.   Gastrointestinal: Negative for heartburn.   Genitourinary: Negative for dysuria.   Neurological: Negative for numbness and paresthesias.   Psychiatric/Behavioral: Negative for altered mental status.   Allergic/Immunologic: Negative for persistent infections.         Objective:            Ortho/SPM Exam  The right knee is examined, there is no evidence of swelling or effusion.  There is no evidence of erythema. Skin, pulses, sensation are intact.            Assessment:       Encounter Diagnosis   Name Primary?    Primary osteoarthritis of right knee Yes          Plan:       PROCEDURE:  I have explained the risks, benefits, and alternatives of the procedure in detail.  The patient voices understanding and all questions have been answered.  The patient agrees to proceed as planned. So after I performed a sterile prep of the skin in the normal fashion the right knee is injected using a 22 gauge needle from the anteromedial approach with 2cc of euflexxa solution. The patient is reminded that it can take 6 - 8 weeks to see all the affects of this treatment, they must complete all three injections to see all the affects and the treatment can not be repeated any earlier than six months.

## 2019-08-21 ENCOUNTER — PATIENT MESSAGE (OUTPATIENT)
Dept: ORTHOPEDICS | Facility: CLINIC | Age: 49
End: 2019-08-21

## 2019-08-22 ENCOUNTER — TELEPHONE (OUTPATIENT)
Dept: ORTHOPEDICS | Facility: CLINIC | Age: 49
End: 2019-08-22

## 2019-08-22 NOTE — TELEPHONE ENCOUNTER
I was able to get the patient with a sports med doctor August 27th for 3:15 at  [facility] [place] location.

## 2019-08-27 ENCOUNTER — OFFICE VISIT (OUTPATIENT)
Dept: SPORTS MEDICINE | Facility: CLINIC | Age: 49
End: 2019-08-27
Payer: COMMERCIAL

## 2019-08-27 VITALS
DIASTOLIC BLOOD PRESSURE: 87 MMHG | HEIGHT: 67 IN | SYSTOLIC BLOOD PRESSURE: 145 MMHG | HEART RATE: 72 BPM | WEIGHT: 243 LBS | BODY MASS INDEX: 38.14 KG/M2

## 2019-08-27 DIAGNOSIS — M76.31 IT BAND SYNDROME, RIGHT: Primary | ICD-10-CM

## 2019-08-27 DIAGNOSIS — M94.261 CHONDROMALACIA OF KNEE, RIGHT: ICD-10-CM

## 2019-08-27 DIAGNOSIS — M25.561 RIGHT KNEE PAIN, UNSPECIFIED CHRONICITY: ICD-10-CM

## 2019-08-27 PROCEDURE — 3079F DIAST BP 80-89 MM HG: CPT | Mod: CPTII,S$GLB,, | Performed by: ORTHOPAEDIC SURGERY

## 2019-08-27 PROCEDURE — 99204 PR OFFICE/OUTPT VISIT, NEW, LEVL IV, 45-59 MIN: ICD-10-PCS | Mod: 25,S$GLB,, | Performed by: ORTHOPAEDIC SURGERY

## 2019-08-27 PROCEDURE — 3008F PR BODY MASS INDEX (BMI) DOCUMENTED: ICD-10-PCS | Mod: CPTII,S$GLB,, | Performed by: ORTHOPAEDIC SURGERY

## 2019-08-27 PROCEDURE — 3077F PR MOST RECENT SYSTOLIC BLOOD PRESSURE >= 140 MM HG: ICD-10-PCS | Mod: CPTII,S$GLB,, | Performed by: ORTHOPAEDIC SURGERY

## 2019-08-27 PROCEDURE — 20610: ICD-10-PCS | Mod: RT,S$GLB,, | Performed by: ORTHOPAEDIC SURGERY

## 2019-08-27 PROCEDURE — 99204 OFFICE O/P NEW MOD 45 MIN: CPT | Mod: 25,S$GLB,, | Performed by: ORTHOPAEDIC SURGERY

## 2019-08-27 PROCEDURE — 3008F BODY MASS INDEX DOCD: CPT | Mod: CPTII,S$GLB,, | Performed by: ORTHOPAEDIC SURGERY

## 2019-08-27 PROCEDURE — 3079F PR MOST RECENT DIASTOLIC BLOOD PRESSURE 80-89 MM HG: ICD-10-PCS | Mod: CPTII,S$GLB,, | Performed by: ORTHOPAEDIC SURGERY

## 2019-08-27 PROCEDURE — 99999 PR PBB SHADOW E&M-EST. PATIENT-LVL III: ICD-10-PCS | Mod: PBBFAC,,, | Performed by: ORTHOPAEDIC SURGERY

## 2019-08-27 PROCEDURE — 99999 PR PBB SHADOW E&M-EST. PATIENT-LVL III: CPT | Mod: PBBFAC,,, | Performed by: ORTHOPAEDIC SURGERY

## 2019-08-27 PROCEDURE — 20610 DRAIN/INJ JOINT/BURSA W/O US: CPT | Mod: RT,S$GLB,, | Performed by: ORTHOPAEDIC SURGERY

## 2019-08-27 PROCEDURE — 3077F SYST BP >= 140 MM HG: CPT | Mod: CPTII,S$GLB,, | Performed by: ORTHOPAEDIC SURGERY

## 2019-08-27 RX ADMIN — TRIAMCINOLONE ACETONIDE 40 MG: 40 INJECTION, SUSPENSION INTRA-ARTICULAR; INTRAMUSCULAR at 10:08

## 2019-08-27 NOTE — LETTER
September 2, 2019      Chayo Marks PA-C  [address]  [facility] [address]           [facility]  [address]  [facility] [address]  Phone: [phone]          Patient: Ailyn Yanes   MR Number: [id number]   YOB: 1970   Date of Visit: 8/27/2019       Dear Chayo Marks:    Thank you for referring Ailyn Yanes to me for evaluation. Attached you will find relevant portions of my assessment and plan of care.    If you have questions, please do not hesitate to call me. I look forward to following Ailyn Yanes along with you.    Sincerely,    JOANN Foster MD    Enclosure  CC:  No Recipients    If you would like to receive this communication electronically, please contact [email or web address] or [phone] to request more information on [employer] access.    For providers and/or their staff who would like to refer a patient to [facility], please contact us through our one-stop-shop provider referral line, [facility] , at [phone].    If you feel you have received this communication in error or would no longer like to receive these types of communications, please e-mail [email or web address]

## 2019-08-27 NOTE — PROGRESS NOTES
CC: Right knee pain    48 y.o. Female who presents as a new patient to me. She is retired and has been having worsening left knee pain for the past year with an atraumatic onset. She states her pain has been managed by a mid level provider over at [facility] with cortisone and Euflexxa injections. Her 1st injection was last in July which helped tremendously with the pain. Her 2nd injection was in July of this year which resulted in limited relief. She received a Euflexxa series thereafter with only mild relief of her pain. She has been  having burning pain of the anterolateral aspect of her knee just distal to the lateral joint line. Her pain is worsened with prolonged ambulation standing and going down steps.  She has associated clicking and popping but no locking or prominent mechanical symptoms. Additional treatment has included PT, oral meds. Here today to discuss diagnosis and treatment options.      PMHx notable for htn, diabetes.   Negative for tobacco.   Positive for diabetes. Last A1C; 6.2    Pain Score:   5    REVIEW OF SYSTEMS:   Constitution: Negative. Negative for chills, fever and night sweats.    Hematologic/Lymphatic: Negative for bleeding problem. Does not bruise/bleed easily.   Skin: Negative for dry skin, itching and rash.   Musculoskeletal: Negative for falls. Positive for right knee pain and  muscle weakness.     All other review of symptoms were reviewed and found to be noncontributory.     PAST MEDICAL HISTORY:   Past Medical History:   Diagnosis Date    Allergy     Hypertension     Kidney stone     Prediabetes     Thyroid disease        PAST SURGICAL HISTORY:   Past Surgical History:   Procedure Laterality Date     SECTION      KIDNEY STONE SURGERY Left     REDUCTION-TURBINATE,56891 Bilateral 2015    Performed by [facility]un Luna MD at [facility] OR 2ND FLR    SEPTOPLASTY, 14049 Bilateral 2015    Performed by [facility]un Luna MD at [facility] OR 2ND FLR    SINUS  SURGERY FUNCTIONAL ENDOSCOPIC WITH NAVIGATION, 54655, 24473, 74684, 97361, 29415 Bilateral 2015    [facility]    TONSILLECTOMY      TONSILLECTOMY,78526 Bilateral 2015    [facility]       FAMILY HISTORY:   Family History   Problem Relation Age of Onset    Hypertension Mother     Cancer Mother     Diabetes Mother     Diabetes Father     Hypertension Father     Hyperlipidemia Father     Heart disease Father     Diabetes Brother     Hypertension Brother     Heart disease Paternal Grandfather     Cancer Maternal Grandmother     Hypertension Maternal Grandmother     Heart disease Maternal Grandmother     Diabetes Maternal Grandfather     Heart disease Paternal Grandmother     Melanoma Neg Hx        SOCIAL HISTORY:   Social History     Socioeconomic History    Marital status:      Spouse name: Not on file    Number of children: Not on file    Years of education: Not on file    Highest education level: Not on file   Occupational History    Not on file   Social Needs    Financial resource strain: Not on file    Food insecurity:     Worry: Not on file     Inability: Not on file    Transportation needs:     Medical: Not on file     Non-medical: Not on file   Tobacco Use    Smoking status: Former Smoker     Last attempt to quit: 1993     Years since quittin.6    Smokeless tobacco: Never Used   Substance and Sexual Activity    Alcohol use: No    Drug use: Not on file    Sexual activity: Yes     Partners: Female   Lifestyle    Physical activity:     Days per week: Not on file     Minutes per session: Not on file    Stress: Not on file   Relationships    Social connections:     Talks on phone: Not on file     Gets together: Not on file     Attends Voodoo service: Not on file     Active member of club or organization: Not on file     Attends meetings of clubs or organizations: Not on  file     Relationship status: Not on file   Other Topics Concern    Are you pregnant or think you may be? Not Asked    Breast-feeding Not Asked   Social History Narrative    Not on file       MEDICATIONS:     Current Outpatient Medications:     amitriptyline (ELAVIL) 25 MG tablet, amitriptyline 25 mg tablet  TAKE 1 TABLET BY MOUTH EVERY DAY, Disp: , Rfl:     amoxicillin (AMOXIL) 500 MG capsule, amoxicillin 500 mg capsule  TAKE ONE CAPSULE BY MOUTH TWICE A DAY, Disp: , Rfl:     betamethasone dipropionate (DIPROLENE) 0.05 % cream, betamethasone dipropionate 0.05 % topical cream, Disp: , Rfl:     budesonide (PULMICORT) 0.5 mg/2 mL nebulizer solution, budesonide 0.5 mg/2 mL suspension for nebulization  daily, Disp: , Rfl:     cholecalciferol, vitamin D3, (VITAMIN D3) 2,000 unit Cap, Vitamin D3 2,000 unit capsule  Take 1 capsule every day by oral route., Disp: , Rfl:     ciprofloxacin HCl (CIPRO) 500 MG tablet, Cipro 500 mg tablet  Take 1 tablet every 12 hours by oral route for 7 days., Disp: , Rfl:     clindamycin (CLEOCIN) 75 mg/5 mL SolR, clindamycin 75 mg/5 mL oral solution  TAKE 10ML BY MOUTH 3 TIMES A DAY, Disp: , Rfl:     clobetasol (TEMOVATE) 0.05 % cream, APPLY A THIN LAYER TO THE AFFECTED AREA(S) BY TOPICAL ROUTE 2 TIMES PER DAY, Disp: , Rfl: 1    doxepin (SINEQUAN) 25 MG capsule, TAKE 1 CAPSULE BY MOUTH EVERYDAY AT BEDTIME, Disp: , Rfl: 3    doxycycline (VIBRA-TABS) 100 MG tablet, doxycycline hyclate 100 mg tablet, Disp: , Rfl:     esomeprazole (NEXIUM) 40 MG capsule, TAKE 1 CAPSULE (40 MG TOTAL) BY MOUTH BEFORE DINNER., Disp: 90 capsule, Rfl: 2    fexofenadine (ALLEGRA) 180 MG tablet, Allergy Relief (fexofenadine) 180 mg tablet  TAKE 1 TABLET BY MOUTH EVERY DAY, Disp: , Rfl:     fexofenadine-pseudoephedrine  mg (ALLEGRA-D 12 HOUR)  mg per tablet, Allegra-D 12 Hour  1 po daily, Disp: , Rfl:     flu vac qs 2016,4 yr up,CD,PF, (FLUCELVAX QUAD 3547-0996, PF,) 60 mcg (15 mcg x 4)/0.5  mL Syrg, Flucelvax Quad 2324-9373 (PF) 60 mcg (15 mcg x 4)/0.5 mL IM syringe  TO BE ADMINISTERED BY PHARMACIST FOR IMMUNIZATION, Disp: , Rfl:     flu vac ts 2016-17,4 yr up,/PF (FLU VAC TS 2014-15,36MOS+,,PF,) 45 mcg (15 mcg x 3)/0.5 mL, Afluria 0065-9920(PF) 45 mcg (15 mcg x 3)/0.5 mL intramuscular syringe, Disp: , Rfl:     fluticasone (FLONASE ALLERGY RELIEF) 50 mcg/actuation nasal spray, Flonase Allergy Relief, Disp: , Rfl:     FLUZONE QUAD 0926-1724, PF, 60 mcg (15 mcg x 4)/0.5 mL Syrg, TO BE ADMINISTERED BY PHARMACIST FOR IMMUNIZATION, Disp: , Rfl: 0    guaiFENesin (MUCINEX) 600 mg 12 hr tablet, Mucinex 600 mg tablet, extended release  Take 1 tablet every 12 hours by oral route as directed., Disp: , Rfl:     hydrochlorothiazide (HYDRODIURIL) 25 MG tablet, Take 25 mg by mouth once daily., Disp: , Rfl:     hydrocodone-acetaminophen (HYCET) solution 7.5-325 mg/15mL, hydrocodone 7.5 mg-acetaminophen 325 mg/15 mL oral solution  TK 15ML Q 4 H PRN, Disp: , Rfl:     hydrOXYzine HCl (ATARAX) 25 MG tablet, Take 25 mg by mouth once daily., Disp: , Rfl: 6    ketoconazole (NIZORAL) 2 % cream, APPLY TWICE DAILY TO RASH., Disp: , Rfl: 3    ketoconazole (NIZORAL) 2 % shampoo, ketoconazole 2 % shampoo, Disp: , Rfl:     loratadine (CLARITIN) 10 mg tablet, Claritin 10 mg tablet  Take 1 tablet every day by oral route., Disp: , Rfl:     medroxyPROGESTERone (DEPO-PROVERA) 150 mg/mL injection, Inject 150 mg into the muscle every 3 (three) months., Disp: , Rfl:     menthol (BIOFREEZE, MENTHOL,) 4 % Gel, 5 mLs., Disp: , Rfl:     metFORMIN (GLUCOPHAGE) 500 MG tablet, Take 500 mg by mouth 2 (two) times daily with meals., Disp: , Rfl:     montelukast (SINGULAIR) 10 mg tablet, montelukast 10 mg tablet  TAKE 1 TABLET(S) EVERY DAY BY ORAL ROUTE., Disp: , Rfl:     multivitamin capsule, Take 1 capsule by mouth once daily., Disp: , Rfl:     nystatin (MYCOSTATIN) ointment, nystatin 100,000 unit/gram topical ointment, Disp: , Rfl:  "    oxyCODONE-acetaminophen (PERCOCET) 5-325 mg per tablet, Take 1 tablet by mouth every 6 (six) hours as needed for Pain. [id number], Disp: 20 tablet, Rfl: 0    phenazopyridine (PYRIDIUM) 200 MG tablet, phenazopyridine 200 mg tablet, Disp: , Rfl:     predniSONE (DELTASONE) 20 MG tablet, prednisone 20 mg tablet  TAKE 1 TABLET BY MOUTH TWICE A DAY FOR 5 DAYS, Disp: , Rfl:     quinapril (ACCUPRIL) 10 MG tablet, quinapril 10 mg tablet  TAKE 1 TABLET BY MOUTH EVERY DAY, Disp: , Rfl:     sertraline (ZOLOFT) 50 MG tablet, sertraline 100 mg tablet  TAKE 1 TABLET BY MOUTH EVERY DAY, Disp: , Rfl:     sertraline (ZOLOFT) 50 MG tablet, Take 50 mg by mouth once daily., Disp: , Rfl: 3    traMADol (ULTRAM) 50 mg tablet, tramadol 50 mg tablet  TAKE 1-2 TABLETS BY MOUTH EVERY 6 HOURS AS NEEDED FOR PAIN, Disp: , Rfl:     valsartan (DIOVAN) 80 MG tablet, Take 80 mg by mouth once daily., Disp: , Rfl:     atenolol (TENORMIN) 50 MG tablet, 50 mg., Disp: , Rfl:     cloNIDine (CATAPRES) 0.1 MG tablet, Take one tablet at bedtime, Disp: 30 tablet, Rfl: 6    levothyroxine (SYNTHROID) 150 MCG tablet, Take one tablet daily except on sundays take 2 pills, Disp: 35 tablet, Rfl: 6    tamsulosin (FLOMAX) 0.4 mg Cap, Take 1 capsule (0.4 mg total) by mouth after dinner. To promote stone passage, Disp: 30 capsule, Rfl: 1    ALLERGIES:   Review of patient's allergies indicates:   Allergen Reactions    Ace inhibitors Other (See Comments) and Anaphylaxis     cough    Amoxicillin Nausea And Vomiting    Ibuprofen Swelling    Meloxicam Swelling    Nitrofurantoin monohyd/m-cryst Nausea And Vomiting    Aspirin Hives and Rash    Cefuroxime axetil Hives and Rash    Clindamycin Rash    Minocycline Rash        PHYSICAL EXAMINATION:  BP (!) 145/87   Pulse 72   Ht 5' 7" (1.702 m)   Wt 110.2 kg (243 lb)   BMI 38.06 kg/m²   General: Well-developed well-nourished 48 y.o. femalein no acute distress   Cardiovascular: Regular rhythm by palpation " of distal pulse, normal color and temperature, no concerning varicosities on symptomatic side   Lungs: No labored breathing or wheezing appreciated   Neuro: Alert and oriented ×3   Psychiatric: well oriented to person, place and time, demonstrates normal mood and affect   Skin: No rashes, lesions or ulcers, normal temperature, turgor, and texture on involved extremity      Ortho/SPM Exam  Examination of the right knee demonstrates tenderness over the Gerdy tubercle. Positive Jatin test.  Medial joint line tenderness. Full range of motion with patellofemoral crepitus.  Negative patella grind. Negative patella apprehension. Negative Lachman.  Negative posterior drawer. Knee stable to varus/valgus stress testing. Neurovascularly intact      IMAGING:  X-rays including standing, weight bearing AP and flexion bilateral knees, RIGHT knee lateral and sunrise views ordered and images reviewed by me show:    Mild DJD.  No fracture or dislocation.  No bone destruction identified    MRI reviewed by me and discussed with patient. Study shows: No significant change since the comparison MRI from 05/18/2018.  Chondromalacia patella as before.  No meniscal tear.      ASSESSMENT:      ICD-10-CM ICD-9-CM   1. It band syndrome, right M76.31 728.89   2. Chondromalacia of knee, right M94.261 717.7   3. Right knee pain, unspecified chronicity M25.561 719.46       PLAN:     -Findings and treatment options were discussed with the patient  -Patient given injection over the insertion of the distal ITB band.  Patient tolerated the procedure well.  -Plan includes therapy for IT band stretching, activity modification, oral nsaids. F/u prn  -All questions answered      Large Joint Aspiration/Injection: R knee distal IT band  Date/Time: 8/27/2019 10:40 PM  Performed by: JOANN Foster MD  Authorized by: JOANN Foster MD     Consent Done?:  Yes (Verbal)  Indications:  Pain  Procedure site marked: Yes    Timeout: Prior to procedure the correct  patient, procedure, and site was verified    Anesthesia  Local anesthesia used  Anesthetic: lidocaine 1% without epinephrine, bupivacaine 0.25% without epinephrine and co-phenylcaine spray  Anesthetic total: 4mL    Location:  Knee  Site:  R knee  Prep: Patient was prepped and draped in usual sterile fashion    Needle size:  22 G  Ultrasonic Guidance for needle placement: No  Approach:  Lateral  Medications:  40 mg triamcinolone acetonide 40 mg/mL  Patient tolerance:  Patient tolerated the procedure well with no immediate complications

## 2019-08-28 ENCOUNTER — PATIENT MESSAGE (OUTPATIENT)
Dept: ENDOCRINOLOGY | Facility: CLINIC | Age: 49
End: 2019-08-28

## 2019-08-28 ENCOUNTER — OFFICE VISIT (OUTPATIENT)
Dept: ENDOCRINOLOGY | Facility: CLINIC | Age: 49
End: 2019-08-28
Payer: COMMERCIAL

## 2019-08-28 VITALS
SYSTOLIC BLOOD PRESSURE: 132 MMHG | WEIGHT: 243.75 LBS | HEIGHT: 67 IN | BODY MASS INDEX: 38.26 KG/M2 | HEART RATE: 75 BPM | DIASTOLIC BLOOD PRESSURE: 84 MMHG

## 2019-08-28 DIAGNOSIS — E03.9 HYPOTHYROIDISM, UNSPECIFIED TYPE: Primary | ICD-10-CM

## 2019-08-28 DIAGNOSIS — E11.9 CONTROLLED TYPE 2 DIABETES MELLITUS WITHOUT COMPLICATION, WITHOUT LONG-TERM CURRENT USE OF INSULIN: ICD-10-CM

## 2019-08-28 DIAGNOSIS — E03.9 HYPOTHYROIDISM, UNSPECIFIED TYPE: ICD-10-CM

## 2019-08-28 DIAGNOSIS — N95.1 MENOPAUSAL SYMPTOMS: ICD-10-CM

## 2019-08-28 PROCEDURE — 99204 OFFICE O/P NEW MOD 45 MIN: CPT | Mod: S$GLB,,, | Performed by: INTERNAL MEDICINE

## 2019-08-28 PROCEDURE — 3075F SYST BP GE 130 - 139MM HG: CPT | Mod: CPTII,S$GLB,, | Performed by: INTERNAL MEDICINE

## 2019-08-28 PROCEDURE — 3075F PR MOST RECENT SYSTOLIC BLOOD PRESS GE 130-139MM HG: ICD-10-PCS | Mod: CPTII,S$GLB,, | Performed by: INTERNAL MEDICINE

## 2019-08-28 PROCEDURE — 99999 PR PBB SHADOW E&M-EST. PATIENT-LVL III: ICD-10-PCS | Mod: PBBFAC,,, | Performed by: INTERNAL MEDICINE

## 2019-08-28 PROCEDURE — 99999 PR PBB SHADOW E&M-EST. PATIENT-LVL III: CPT | Mod: PBBFAC,,, | Performed by: INTERNAL MEDICINE

## 2019-08-28 PROCEDURE — 3079F DIAST BP 80-89 MM HG: CPT | Mod: CPTII,S$GLB,, | Performed by: INTERNAL MEDICINE

## 2019-08-28 PROCEDURE — 3008F BODY MASS INDEX DOCD: CPT | Mod: CPTII,S$GLB,, | Performed by: INTERNAL MEDICINE

## 2019-08-28 PROCEDURE — 3079F PR MOST RECENT DIASTOLIC BLOOD PRESSURE 80-89 MM HG: ICD-10-PCS | Mod: CPTII,S$GLB,, | Performed by: INTERNAL MEDICINE

## 2019-08-28 PROCEDURE — 3008F PR BODY MASS INDEX (BMI) DOCUMENTED: ICD-10-PCS | Mod: CPTII,S$GLB,, | Performed by: INTERNAL MEDICINE

## 2019-08-28 PROCEDURE — 99204 PR OFFICE/OUTPT VISIT, NEW, LEVL IV, 45-59 MIN: ICD-10-PCS | Mod: S$GLB,,, | Performed by: INTERNAL MEDICINE

## 2019-08-28 RX ORDER — ATENOLOL 50 MG/1
50 TABLET ORAL DAILY
COMMUNITY
Start: 2019-01-01

## 2019-08-28 RX ORDER — CLONIDINE HYDROCHLORIDE 0.1 MG/1
TABLET ORAL
Qty: 30 TABLET | Refills: 6 | Status: SHIPPED | OUTPATIENT
Start: 2019-08-28 | End: 2020-02-21

## 2019-08-28 RX ORDER — LEVOTHYROXINE SODIUM 150 UG/1
TABLET ORAL
Qty: 35 TABLET | Refills: 6 | Status: SHIPPED | OUTPATIENT
Start: 2019-08-28 | End: 2020-02-27

## 2019-08-28 NOTE — ASSESSMENT & PLAN NOTE
Currently on levothyroxine 150 mcg daily   With heat sensitivity will repeat levels today   Reviewed administration today -- doing well

## 2019-08-28 NOTE — ASSESSMENT & PLAN NOTE
On depo provera for five to six years   Discussed impact of long term (>5 years) on bone health  Try clonidine at bedtime, blood pressure is high/normal.   Discussed side effects    Recommended MVI  Check vitamin D levels, consider 24 hr urine     Will send message in about one month

## 2019-08-28 NOTE — PROGRESS NOTES
Subjective:      Patient ID: Ailyn Yanes is a 48 y.o. female.    Chief Complaint:  Thyroid Problem      History of Present Illness    Ms. Yanes is a 48 year old woman with hypothyroidism, this was diagnosed 8 years ago.     She feels low energy. Denies constipation, feels both cold and heat sensitivity. Denies depressed mood but she is taking zoloft. Does not wake up feeling refreshed. She wakes up several times a day.     Her last A1C is 6.1%.   Current medication:  Metformin 500 mg two tablets daily.   Checking her blood sugar regularly.  Exercise: physical therapy due to knee pain  Diet: lost over 25 lbs in the past year. Trying to limit carbs, eats lean proteins.     Hot flushes began three years ago. Heat starts everywhere and starts sweating over her head.   Currently on depo provera for about 5 - 6 years.   Grandmother and Mother with breast cancer. She is BRACA negative.       Review of Systems   Constitutional: Negative for fever.   HENT: Negative for congestion.    Eyes: Negative for visual disturbance.   Respiratory: Negative for shortness of breath.    Cardiovascular: Negative for chest pain.   Gastrointestinal: Negative for abdominal pain.   Genitourinary: Negative for dysuria.   Musculoskeletal: Negative for arthralgias.   Skin: Negative for rash.   Neurological: Negative for weakness.   Hematological: Does not bruise/bleed easily.   Psychiatric/Behavioral: Negative for sleep disturbance.       Objective:   Physical Exam   Constitutional: She appears well-developed and well-nourished. No distress.   HENT:   Head: Normocephalic and atraumatic.   Nose: Nose normal.   Mouth/Throat: Oropharynx is clear and moist. No oropharyngeal exudate.   Eyes: Pupils are equal, round, and reactive to light. Conjunctivae and EOM are normal. No scleral icterus.   Neck: Normal range of motion. Neck supple. No tracheal deviation present. No thyromegaly present.   Cardiovascular: Normal rate, regular rhythm, normal  "heart sounds and intact distal pulses.   Pulmonary/Chest: Effort normal and breath sounds normal.   Abdominal: Soft. Bowel sounds are normal. She exhibits no distension and no mass.   Musculoskeletal: Normal range of motion. She exhibits no edema or tenderness.   Lymphadenopathy:     She has no cervical adenopathy.   Neurological: She is alert. She has normal reflexes. She displays normal reflexes.   Skin: Skin is warm and dry. No rash noted.   Psychiatric: She has a normal mood and affect.     Vitals:    08/28/19 0757   BP: 132/84   Pulse: 75   Weight: 110.6 kg (243 lb 11.5 oz)   Height: 5' 7" (1.702 m)       BP Readings from Last 3 Encounters:   08/28/19 132/84   08/27/19 (!) 145/87   07/30/19 138/79     Wt Readings from Last 1 Encounters:   08/28/19 0757 110.6 kg (243 lb 11.5 oz)         Body mass index is 38.17 kg/m².    Lab Review:   No results found for: HGBA1C  Lab Results   Component Value Date    CHOL 126 12/12/2017    HDL 39 (L) 12/12/2017    LDLCALC 73.6 12/12/2017    TRIG 67 12/12/2017    CHOLHDL 31.0 12/12/2017     Lab Results   Component Value Date     12/12/2017    K 3.7 12/12/2017     12/12/2017    CO2 24 12/12/2017     (H) 12/12/2017    BUN 8 12/12/2017    CREATININE 1.0 12/12/2017    CALCIUM 9.6 12/12/2017    PROT 7.7 12/12/2017    ALBUMIN 3.6 12/12/2017    BILITOT 0.3 12/12/2017    ALKPHOS 128 12/12/2017    AST 18 12/12/2017    ALT 25 12/12/2017    ANIONGAP 11 12/12/2017    ESTGFRAFRICA >60.0 12/12/2017    EGFRNONAA >60.0 12/12/2017         Assessment and Plan     Menopausal symptoms  On depo provera for five to six years   Discussed impact of long term (>5 years) on bone health  Try clonidine at bedtime, blood pressure is high/normal.   Discussed side effects    Recommended MVI  Check vitamin D levels, consider 24 hr urine     Will send message in about one month     Hypothyroidism  Currently on levothyroxine 150 mcg daily   With heat sensitivity will repeat levels today "   Reviewed administration today -- doing well    Controlled type 2 diabetes mellitus without complication, without long-term current use of insulin  Metformin 500 mg two tablets daily   Repeat A1C today

## 2019-09-02 RX ORDER — TRIAMCINOLONE ACETONIDE 40 MG/ML
40 INJECTION, SUSPENSION INTRA-ARTICULAR; INTRAMUSCULAR
Status: DISCONTINUED | OUTPATIENT
Start: 2019-08-27 | End: 2019-09-03 | Stop reason: HOSPADM

## 2019-10-06 ENCOUNTER — PATIENT MESSAGE (OUTPATIENT)
Dept: ENDOCRINOLOGY | Facility: CLINIC | Age: 49
End: 2019-10-06

## 2019-10-07 ENCOUNTER — OFFICE VISIT (OUTPATIENT)
Dept: SPORTS MEDICINE | Facility: CLINIC | Age: 49
End: 2019-10-07
Payer: COMMERCIAL

## 2019-10-07 VITALS
HEART RATE: 72 BPM | BODY MASS INDEX: 38.14 KG/M2 | WEIGHT: 243 LBS | HEIGHT: 67 IN | DIASTOLIC BLOOD PRESSURE: 82 MMHG | SYSTOLIC BLOOD PRESSURE: 145 MMHG

## 2019-10-07 DIAGNOSIS — M94.261 CHONDROMALACIA OF KNEE, RIGHT: Primary | ICD-10-CM

## 2019-10-07 PROCEDURE — 99214 PR OFFICE/OUTPT VISIT, EST, LEVL IV, 30-39 MIN: ICD-10-PCS | Mod: S$GLB,,, | Performed by: ORTHOPAEDIC SURGERY

## 2019-10-07 PROCEDURE — 3079F PR MOST RECENT DIASTOLIC BLOOD PRESSURE 80-89 MM HG: ICD-10-PCS | Mod: CPTII,S$GLB,, | Performed by: ORTHOPAEDIC SURGERY

## 2019-10-07 PROCEDURE — 99999 PR PBB SHADOW E&M-EST. PATIENT-LVL V: ICD-10-PCS | Mod: PBBFAC,,, | Performed by: ORTHOPAEDIC SURGERY

## 2019-10-07 PROCEDURE — 99214 OFFICE O/P EST MOD 30 MIN: CPT | Mod: S$GLB,,, | Performed by: ORTHOPAEDIC SURGERY

## 2019-10-07 PROCEDURE — 3077F SYST BP >= 140 MM HG: CPT | Mod: CPTII,S$GLB,, | Performed by: ORTHOPAEDIC SURGERY

## 2019-10-07 PROCEDURE — 3008F BODY MASS INDEX DOCD: CPT | Mod: CPTII,S$GLB,, | Performed by: ORTHOPAEDIC SURGERY

## 2019-10-07 PROCEDURE — 3008F PR BODY MASS INDEX (BMI) DOCUMENTED: ICD-10-PCS | Mod: CPTII,S$GLB,, | Performed by: ORTHOPAEDIC SURGERY

## 2019-10-07 PROCEDURE — 99999 PR PBB SHADOW E&M-EST. PATIENT-LVL V: CPT | Mod: PBBFAC,,, | Performed by: ORTHOPAEDIC SURGERY

## 2019-10-07 PROCEDURE — 3077F PR MOST RECENT SYSTOLIC BLOOD PRESSURE >= 140 MM HG: ICD-10-PCS | Mod: CPTII,S$GLB,, | Performed by: ORTHOPAEDIC SURGERY

## 2019-10-07 PROCEDURE — 3079F DIAST BP 80-89 MM HG: CPT | Mod: CPTII,S$GLB,, | Performed by: ORTHOPAEDIC SURGERY

## 2019-10-07 NOTE — PROGRESS NOTES
CC: Right knee pain    49 y.o. Female  reports knee pain refractory to conservative mgmt.    She reports that the pain is worse with deep squats.  It also bothers her at night.    She has seen Dr. Foster in the past for this issue. MRI was negative for meniscus tear. She has been attending PT for about 4 months. No previous knee injuries or surgeries. Has tried CSI and Euflexxa to minimal benefit. No traumatic injury or event. Has tried NSAIDs as well to no relief.     Review of Systems   Constitution: Negative. Negative for chills, fever and night sweats.   HENT: Negative for congestion and headaches.    Eyes: Negative for blurred vision, left vision loss and right vision loss.   Cardiovascular: Negative for chest pain and syncope.   Respiratory: Negative for cough and shortness of breath.    Endocrine: Negative for polydipsia, polyphagia and polyuria.   Hematologic/Lymphatic: Negative for bleeding problem. Does not bruise/bleed easily.   Skin: Negative for dry skin, itching and rash.   Musculoskeletal: Negative for falls and muscle weakness.   Gastrointestinal: Negative for abdominal pain and bowel incontinence.   Genitourinary: Negative for bladder incontinence and nocturia.   Neurological: Negative for disturbances in coordination, loss of balance and seizures.   Psychiatric/Behavioral: Negative for depression. The patient does not have insomnia.    Allergic/Immunologic: Negative for hives and persistent infections.     PAST MEDICAL HISTORY:   Past Medical History:   Diagnosis Date    Allergy     Hypertension     Kidney stone     Prediabetes     Thyroid disease      PAST SURGICAL HISTORY:   Past Surgical History:   Procedure Laterality Date     SECTION      KIDNEY STONE SURGERY Left     TONSILLECTOMY       FAMILY HISTORY:   Family History   Problem Relation Age of Onset    Hypertension Mother     Cancer Mother     Diabetes Mother     Diabetes Father     Hypertension Father      Hyperlipidemia Father     Heart disease Father     Diabetes Brother     Hypertension Brother     Heart disease Paternal Grandfather     Cancer Maternal Grandmother     Hypertension Maternal Grandmother     Heart disease Maternal Grandmother     Diabetes Maternal Grandfather     Heart disease Paternal Grandmother     Melanoma Neg Hx      SOCIAL HISTORY:   Social History     Socioeconomic History    Marital status:      Spouse name: Not on file    Number of children: Not on file    Years of education: Not on file    Highest education level: Not on file   Occupational History    Not on file   Social Needs    Financial resource strain: Not on file    Food insecurity:     Worry: Not on file     Inability: Not on file    Transportation needs:     Medical: Not on file     Non-medical: Not on file   Tobacco Use    Smoking status: Former Smoker     Last attempt to quit: 1993     Years since quittin.7    Smokeless tobacco: Never Used   Substance and Sexual Activity    Alcohol use: No    Drug use: Not on file    Sexual activity: Yes     Partners: Female   Lifestyle    Physical activity:     Days per week: Not on file     Minutes per session: Not on file    Stress: Not on file   Relationships    Social connections:     Talks on phone: Not on file     Gets together: Not on file     Attends Faith service: Not on file     Active member of club or organization: Not on file     Attends meetings of clubs or organizations: Not on file     Relationship status: Not on file   Other Topics Concern    Are you pregnant or think you may be? Not Asked    Breast-feeding Not Asked   Social History Narrative    Not on file       MEDICATIONS:   Current Outpatient Medications:     atenolol (TENORMIN) 50 MG tablet, 50 mg., Disp: , Rfl:     cloNIDine (CATAPRES) 0.1 MG tablet, Take one tablet at bedtime, Disp: 30 tablet, Rfl: 6    esomeprazole (NEXIUM) 40 MG capsule, TAKE 1 CAPSULE (40 MG TOTAL) BY  MOUTH BEFORE DINNER., Disp: 90 capsule, Rfl: 2    fexofenadine (ALLEGRA) 180 MG tablet, Allergy Relief (fexofenadine) 180 mg tablet  TAKE 1 TABLET BY MOUTH EVERY DAY, Disp: , Rfl:     flu vac qs 2016,4 yr up,CD,PF, (FLUCELVAX QUAD 8118-7097, PF,) 60 mcg (15 mcg x 4)/0.5 mL Syrg, Flucelvax Quad 9507-0433 (PF) 60 mcg (15 mcg x 4)/0.5 mL IM syringe  TO BE ADMINISTERED BY PHARMACIST FOR IMMUNIZATION, Disp: , Rfl:     flu vac ts 2016-17,4 yr up,/PF (FLU VAC TS 2014-15,36MOS+,,PF,) 45 mcg (15 mcg x 3)/0.5 mL, Afluria 6394-6966(PF) 45 mcg (15 mcg x 3)/0.5 mL intramuscular syringe, Disp: , Rfl:     FLUZONE QUAD 5781-8996, PF, 60 mcg (15 mcg x 4)/0.5 mL Syrg, TO BE ADMINISTERED BY PHARMACIST FOR IMMUNIZATION, Disp: , Rfl: 0    hydrochlorothiazide (HYDRODIURIL) 25 MG tablet, Take 25 mg by mouth once daily., Disp: , Rfl:     ketoconazole (NIZORAL) 2 % cream, APPLY TWICE DAILY TO RASH., Disp: , Rfl: 3    levothyroxine (SYNTHROID) 150 MCG tablet, Take one tablet daily except on sundays take 2 pills, Disp: 35 tablet, Rfl: 6    medroxyPROGESTERone (DEPO-PROVERA) 150 mg/mL injection, Inject 150 mg into the muscle every 3 (three) months., Disp: , Rfl:     menthol (BIOFREEZE, MENTHOL,) 4 % Gel, 5 mLs., Disp: , Rfl:     metFORMIN (GLUCOPHAGE) 500 MG tablet, Take 500 mg by mouth 2 (two) times daily with meals., Disp: , Rfl:     montelukast (SINGULAIR) 10 mg tablet, montelukast 10 mg tablet  TAKE 1 TABLET(S) EVERY DAY BY ORAL ROUTE., Disp: , Rfl:     amitriptyline (ELAVIL) 25 MG tablet, amitriptyline 25 mg tablet  TAKE 1 TABLET BY MOUTH EVERY DAY, Disp: , Rfl:     amoxicillin (AMOXIL) 500 MG capsule, amoxicillin 500 mg capsule  TAKE ONE CAPSULE BY MOUTH TWICE A DAY, Disp: , Rfl:     betamethasone dipropionate (DIPROLENE) 0.05 % cream, betamethasone dipropionate 0.05 % topical cream, Disp: , Rfl:     budesonide (PULMICORT) 0.5 mg/2 mL nebulizer solution, budesonide 0.5 mg/2 mL suspension for nebulization  daily, Disp: ,  Rfl:     cholecalciferol, vitamin D3, (VITAMIN D3) 2,000 unit Cap, Vitamin D3 2,000 unit capsule  Take 1 capsule every day by oral route., Disp: , Rfl:     ciprofloxacin HCl (CIPRO) 500 MG tablet, Cipro 500 mg tablet  Take 1 tablet every 12 hours by oral route for 7 days., Disp: , Rfl:     clindamycin (CLEOCIN) 75 mg/5 mL SolR, clindamycin 75 mg/5 mL oral solution  TAKE 10ML BY MOUTH 3 TIMES A DAY, Disp: , Rfl:     clobetasol (TEMOVATE) 0.05 % cream, APPLY A THIN LAYER TO THE AFFECTED AREA(S) BY TOPICAL ROUTE 2 TIMES PER DAY, Disp: , Rfl: 1    doxepin (SINEQUAN) 25 MG capsule, TAKE 1 CAPSULE BY MOUTH EVERYDAY AT BEDTIME, Disp: , Rfl: 3    doxycycline (VIBRA-TABS) 100 MG tablet, doxycycline hyclate 100 mg tablet, Disp: , Rfl:     fexofenadine-pseudoephedrine  mg (ALLEGRA-D 12 HOUR)  mg per tablet, Allegra-D 12 Hour  1 po daily, Disp: , Rfl:     fluticasone (FLONASE ALLERGY RELIEF) 50 mcg/actuation nasal spray, Flonase Allergy Relief, Disp: , Rfl:     guaiFENesin (MUCINEX) 600 mg 12 hr tablet, Mucinex 600 mg tablet, extended release  Take 1 tablet every 12 hours by oral route as directed., Disp: , Rfl:     hydrocodone-acetaminophen (HYCET) solution 7.5-325 mg/15mL, hydrocodone 7.5 mg-acetaminophen 325 mg/15 mL oral solution  TK 15ML Q 4 H PRN, Disp: , Rfl:     hydrOXYzine HCl (ATARAX) 25 MG tablet, Take 25 mg by mouth once daily., Disp: , Rfl: 6    ketoconazole (NIZORAL) 2 % shampoo, ketoconazole 2 % shampoo, Disp: , Rfl:     loratadine (CLARITIN) 10 mg tablet, Claritin 10 mg tablet  Take 1 tablet every day by oral route., Disp: , Rfl:     multivitamin capsule, Take 1 capsule by mouth once daily., Disp: , Rfl:     nystatin (MYCOSTATIN) ointment, nystatin 100,000 unit/gram topical ointment, Disp: , Rfl:     oxyCODONE-acetaminophen (PERCOCET) 5-325 mg per tablet, Take 1 tablet by mouth every 6 (six) hours as needed for Pain. [id number], Disp: 20 tablet, Rfl: 0    phenazopyridine (PYRIDIUM)  "200 MG tablet, phenazopyridine 200 mg tablet, Disp: , Rfl:     predniSONE (DELTASONE) 20 MG tablet, prednisone 20 mg tablet  TAKE 1 TABLET BY MOUTH TWICE A DAY FOR 5 DAYS, Disp: , Rfl:     quinapril (ACCUPRIL) 10 MG tablet, quinapril 10 mg tablet  TAKE 1 TABLET BY MOUTH EVERY DAY, Disp: , Rfl:     sertraline (ZOLOFT) 50 MG tablet, sertraline 100 mg tablet  TAKE 1 TABLET BY MOUTH EVERY DAY, Disp: , Rfl:     sertraline (ZOLOFT) 50 MG tablet, Take 50 mg by mouth once daily., Disp: , Rfl: 3    tamsulosin (FLOMAX) 0.4 mg Cap, Take 1 capsule (0.4 mg total) by mouth after dinner. To promote stone passage, Disp: 30 capsule, Rfl: 1    traMADol (ULTRAM) 50 mg tablet, tramadol 50 mg tablet  TAKE 1-2 TABLETS BY MOUTH EVERY 6 HOURS AS NEEDED FOR PAIN, Disp: , Rfl:     valsartan (DIOVAN) 80 MG tablet, Take 80 mg by mouth once daily., Disp: , Rfl:   ALLERGIES:   Review of patient's allergies indicates:   Allergen Reactions    Ace inhibitors Other (See Comments) and Anaphylaxis     cough    Amoxicillin Nausea And Vomiting    Ibuprofen Swelling    Meloxicam Swelling    Nitrofurantoin monohyd/m-cryst Nausea And Vomiting    Aspirin Hives and Rash    Cefuroxime axetil Hives and Rash    Clindamycin Rash    Minocycline Rash       VITAL SIGNS: BP (!) 145/82   Pulse 72   Ht 5' 7" (1.702 m)   Wt 110.2 kg (243 lb)   BMI 38.06 kg/m²        PHYSICAL EXAMINATION    General:  The patient is alert and oriented x 3.  Mood is pleasant.  Observation of ears, eyes and nose reveal no gross abnormalities.  HEENT: NCAT, sclera nonicteric  Lungs: Respirations are equal and unlabored.      Right KNEE EXAMINATION     OBSERVATION / INSPECTION   Gait:   Nonantalgic   Alignment:  Neutral   Scars:   None   Muscle atrophy: Mild  Effusion:  None   Warmth:  None   Discoloration:   none     TENDERNESS / CREPITUS (T / C):          T / C      T / C   Patella   - / -   Lateral joint line   - / -   Peripatellar medial  -  Medial joint line    - / " -   Peripatellar lateral +  Medial plica   - / -   Patellar tendon -   Popliteal fossa  - / -   Quad tendon   -   Gastrocnemius   -   Prepatellar Bursa - / -   Quadricep   -   Tibial tubercle  -  Thigh/hamstring  -   Pes anserine/HS -  Fibula    -   ITB   - / -  Tibia     -   Tib/fib joint  - / -  LCL    -     MFC   - / -   MCL: Proximal  -    LFC   - / -    Distal   -          ROM: (* = pain)  PASSIVE   ACTIVE    Left :   5 / 0 / 145   5 / 0 / 145     Right :    5 / 0 / 145   5 / 0 / 145    PATELLOFEMORAL EXAMINATION:  See above noted areas of tenderness.   Patella position    Subluxation / dislocation: Centered           Sup. / Inf;   Normal   Crepitus (PF):    Absent   Patellar Mobility:       Medial-lateral:   Normal    Superior-inferior:  Normal    Inferior tilt   Normal    Patellar tendon:  Normal   Lateral tilt:    Normal   J-sign:     None   Patellofemoral grind:   No pain       MENISCAL SIGNS:     Pain on terminal extension:  -  Pain on terminal flexion:  +  Jays maneuver:  -  Squat     -    LIGAMENT EXAMINATION:  ACL / Lachman:  normal (-1 to 2mm)    PCL-Post.  drawer: normal 0 to 2mm  MCL- Valgus:  normal 0 to 2mm  LCL- Varus:  normal 0 to 2mm  Pivot shift: normal (Equal)   Dial Test: difference c/w other side   At 30° flexion: normal (< 5°)    At 90° flexion: normal (< 5°)   Reverse Pivot Shift:   normal (Equal)     STRENGTH: (* = with pain) PAINFUL SIDE   Quadricep   5/5   Hamstrin/5    EXTREMITY NEURO-VASCULAR EXAMINATION:   Sensation:  Grossly intact to light touch all dermatomal regions.   Motor Function:  Fully intact motor function at hip, knee, foot and ankle    DTRs;  quadriceps and  achilles 2+.  No clonus and downgoing Babinski.    Vascular status:  DP and PT pulses 2+, brisk capillary refill, symmetric.     Other Findings:          Xrays: ordered and reviewed personally by me (standing AP/flexion, lateral, sunrise) show: no evidence of arthritis or fracture or  dislocation    MRI R knee  No meniscal tear, chondromalacia noted, lateral plica      MRI reviewed personally by me:  Shows Right knee lateral plicae, chondromalacia.     ASSESSMENT:    L knee plica     she would benefit from knee arthroscopy, plica excision, possible chondroplasty with possible meniscectomy given the above.     PLAN: We have discussed the surgery and recovery of arthroscopic knee surgery. she understands that there may be limited weightbearing up to several weeks after surgery depending on procedures that are performed at the time of surgery.    The spectrum of treatment options were discussed with the patient, including nonoperative and operative options.  After thorough discussion, the patient has elected to undergo surgical treatment to include:  right   a. Knee arthroscopic lateral plica excision   b. Knee arthroscopic possible chondroplasty   c. Knee arthroscopic possible NATHEN   d. Knee arthroscopic possible medial/lateral meniscectomy    The details of the surgical procedure were explained, including the location of probable incisions and a description of likely hardware and/or grafts to be used.  The patient understands the likely convalescence after surgery.  Also, we have thoroughly discussed the risks, benefits and alternatives to surgery, including, but not limited to, the risk of infection, joint stiffness, blood clot (including DVT and/or pulmonary embolus), neurologic and vascular injury.  It was explained that, if tissue has been repaired or reconstructed, there is a chance of failure, which may require further management.

## 2019-10-08 DIAGNOSIS — M67.50 PLICA SYNDROME: Primary | ICD-10-CM

## 2019-10-08 DIAGNOSIS — M94.261 CHONDROMALACIA OF RIGHT KNEE: ICD-10-CM

## 2019-10-16 ENCOUNTER — TELEPHONE (OUTPATIENT)
Dept: SPORTS MEDICINE | Facility: CLINIC | Age: 49
End: 2019-10-16

## 2019-10-16 NOTE — TELEPHONE ENCOUNTER
Called Karolina back and spoke to Jennifer. Per Bella it would be up to her PCP if [atient needed any additional lab work for clearance.

## 2019-10-16 NOTE — TELEPHONE ENCOUNTER
----- Message from Luz Vargas sent at 10/16/2019  3:12 PM CDT -----  Contact: Karolina (Dr. Kan's Office) [phone]  Karolina called to speak to someone regarding the patient's surgery clearance. They would like to confirm rather or not she'll need lab work. Please contact her to discuss further.

## 2019-10-18 ENCOUNTER — OFFICE VISIT (OUTPATIENT)
Dept: SPORTS MEDICINE | Facility: CLINIC | Age: 49
End: 2019-10-18
Payer: COMMERCIAL

## 2019-10-18 VITALS
WEIGHT: 240 LBS | HEIGHT: 67 IN | BODY MASS INDEX: 37.67 KG/M2 | DIASTOLIC BLOOD PRESSURE: 78 MMHG | SYSTOLIC BLOOD PRESSURE: 119 MMHG | HEART RATE: 61 BPM

## 2019-10-18 DIAGNOSIS — M67.51 PLICA SYNDROME, RIGHT KNEE: Primary | ICD-10-CM

## 2019-10-18 DIAGNOSIS — M94.261 CHONDROMALACIA OF RIGHT KNEE: ICD-10-CM

## 2019-10-18 PROCEDURE — 99499 UNLISTED E&M SERVICE: CPT | Mod: S$GLB,,, | Performed by: PHYSICIAN ASSISTANT

## 2019-10-18 PROCEDURE — 99999 PR PBB SHADOW E&M-EST. PATIENT-LVL III: ICD-10-PCS | Mod: PBBFAC,,, | Performed by: PHYSICIAN ASSISTANT

## 2019-10-18 PROCEDURE — 99999 PR PBB SHADOW E&M-EST. PATIENT-LVL III: CPT | Mod: PBBFAC,,, | Performed by: PHYSICIAN ASSISTANT

## 2019-10-18 PROCEDURE — 99499 NO LOS: ICD-10-PCS | Mod: S$GLB,,, | Performed by: PHYSICIAN ASSISTANT

## 2019-10-18 RX ORDER — SODIUM CHLORIDE 9 MG/ML
INJECTION, SOLUTION INTRAVENOUS CONTINUOUS
Status: CANCELLED | OUTPATIENT
Start: 2019-10-18

## 2019-10-18 RX ORDER — HYDROCODONE BITARTRATE AND ACETAMINOPHEN 10; 325 MG/1; MG/1
1 TABLET ORAL EVERY 6 HOURS PRN
Qty: 28 TABLET | Refills: 0 | Status: SHIPPED | OUTPATIENT
Start: 2019-10-18 | End: 2019-10-30

## 2019-10-18 RX ORDER — PROMETHAZINE HYDROCHLORIDE 25 MG/1
25 TABLET ORAL EVERY 6 HOURS PRN
Qty: 30 TABLET | Refills: 0 | Status: SHIPPED | OUTPATIENT
Start: 2019-10-18 | End: 2019-11-17

## 2019-10-18 RX ORDER — ENOXAPARIN SODIUM 100 MG/ML
40 INJECTION SUBCUTANEOUS DAILY
Qty: 4 ML | Refills: 0 | Status: SHIPPED | OUTPATIENT
Start: 2019-10-18 | End: 2020-02-05

## 2019-10-18 NOTE — H&P (VIEW-ONLY)
"Ailyn Yanes  is here for a completion of her perioperative paperwork. she  Is scheduled to undergo right              a. Knee arthroscopic lateral plica excision              b. Knee arthroscopic possible chondroplasty              c. Knee arthroscopic possible NATHEN              d. Knee arthroscopic possible medial/lateral meniscectomy on 10/24/19.  She is a healthy individual and does need clearance for this procedure.   Consuelo Kan NP, at [facility], stated that " She is medically cleared for knee surgery and is low risk for complications."    Risks, indications and benefits of the surgical procedure were discussed with the patient. All questions with regard to surgery, rehab, expected return to functional activities, activities of daily living and recreational endeavors were answered to her satisfaction.    Patient was informed and understands the risks of surgery are greater for patients with a current condition or history of heart disease, obesity, clotting disorders, recurrent infections, steroid use, current or past smoking, and factors such as sedentary lifestyle and noncompliance with medications, therapy or follow-up. The degree of the increased risk is hard to estimate with any degree of precision.    Once no other questions were asked, a brief history and physical exam was then performed.    PAST MEDICAL HISTORY:   Past Medical History:   Diagnosis Date    Allergy     Hypertension     Kidney stone     Prediabetes     Thyroid disease      PAST SURGICAL HISTORY:   Past Surgical History:   Procedure Laterality Date     SECTION      KIDNEY STONE SURGERY Left 2004    TONSILLECTOMY       FAMILY HISTORY:   Family History   Problem Relation Age of Onset    Hypertension Mother     Cancer Mother     Diabetes Mother     Diabetes Father     Hypertension Father     Hyperlipidemia Father     Heart disease Father     Diabetes Brother     Hypertension Brother     Heart disease " Paternal Grandfather     Cancer Maternal Grandmother     Hypertension Maternal Grandmother     Heart disease Maternal Grandmother     Diabetes Maternal Grandfather     Heart disease Paternal Grandmother     Melanoma Neg Hx      SOCIAL HISTORY:   Social History     Socioeconomic History    Marital status:      Spouse name: Not on file    Number of children: Not on file    Years of education: Not on file    Highest education level: Not on file   Occupational History    Not on file   Social Needs    Financial resource strain: Not on file    Food insecurity:     Worry: Not on file     Inability: Not on file    Transportation needs:     Medical: Not on file     Non-medical: Not on file   Tobacco Use    Smoking status: Former Smoker     Last attempt to quit: 1993     Years since quittin.8    Smokeless tobacco: Never Used   Substance and Sexual Activity    Alcohol use: No    Drug use: Not on file    Sexual activity: Yes     Partners: Female   Lifestyle    Physical activity:     Days per week: Not on file     Minutes per session: Not on file    Stress: Not on file   Relationships    Social connections:     Talks on phone: Not on file     Gets together: Not on file     Attends Congregation service: Not on file     Active member of club or organization: Not on file     Attends meetings of clubs or organizations: Not on file     Relationship status: Not on file   Other Topics Concern    Are you pregnant or think you may be? Not Asked    Breast-feeding Not Asked   Social History Narrative    Not on file       MEDICATIONS:   Current Outpatient Medications:     amitriptyline (ELAVIL) 25 MG tablet, amitriptyline 25 mg tablet  TAKE 1 TABLET BY MOUTH EVERY DAY, Disp: , Rfl:     amoxicillin (AMOXIL) 500 MG capsule, amoxicillin 500 mg capsule  TAKE ONE CAPSULE BY MOUTH TWICE A DAY, Disp: , Rfl:     atenolol (TENORMIN) 50 MG tablet, 50 mg., Disp: , Rfl:     betamethasone dipropionate  (DIPROLENE) 0.05 % cream, betamethasone dipropionate 0.05 % topical cream, Disp: , Rfl:     budesonide (PULMICORT) 0.5 mg/2 mL nebulizer solution, budesonide 0.5 mg/2 mL suspension for nebulization  daily, Disp: , Rfl:     cholecalciferol, vitamin D3, (VITAMIN D3) 2,000 unit Cap, Vitamin D3 2,000 unit capsule  Take 1 capsule every day by oral route., Disp: , Rfl:     ciprofloxacin HCl (CIPRO) 500 MG tablet, Cipro 500 mg tablet  Take 1 tablet every 12 hours by oral route for 7 days., Disp: , Rfl:     clindamycin (CLEOCIN) 75 mg/5 mL SolR, clindamycin 75 mg/5 mL oral solution  TAKE 10ML BY MOUTH 3 TIMES A DAY, Disp: , Rfl:     clobetasol (TEMOVATE) 0.05 % cream, APPLY A THIN LAYER TO THE AFFECTED AREA(S) BY TOPICAL ROUTE 2 TIMES PER DAY, Disp: , Rfl: 1    cloNIDine (CATAPRES) 0.1 MG tablet, Take one tablet at bedtime, Disp: 30 tablet, Rfl: 6    doxepin (SINEQUAN) 25 MG capsule, TAKE 1 CAPSULE BY MOUTH EVERYDAY AT BEDTIME, Disp: , Rfl: 3    doxycycline (VIBRA-TABS) 100 MG tablet, doxycycline hyclate 100 mg tablet, Disp: , Rfl:     esomeprazole (NEXIUM) 40 MG capsule, TAKE 1 CAPSULE (40 MG TOTAL) BY MOUTH BEFORE DINNER., Disp: 90 capsule, Rfl: 2    fexofenadine (ALLEGRA) 180 MG tablet, Allergy Relief (fexofenadine) 180 mg tablet  TAKE 1 TABLET BY MOUTH EVERY DAY, Disp: , Rfl:     fexofenadine-pseudoephedrine  mg (ALLEGRA-D 12 HOUR)  mg per tablet, Allegra-D 12 Hour  1 po daily, Disp: , Rfl:     flu vac qs 2016,4 yr up,CD,PF, (FLUCELVAX QUAD 3379-2448, PF,) 60 mcg (15 mcg x 4)/0.5 mL Syrg, Flucelvax Quad 4652-9989 (PF) 60 mcg (15 mcg x 4)/0.5 mL IM syringe  TO BE ADMINISTERED BY PHARMACIST FOR IMMUNIZATION, Disp: , Rfl:     flu vac ts 2016-17,4 yr up,/PF (FLU VAC TS 2014-15,36MOS+,,PF,) 45 mcg (15 mcg x 3)/0.5 mL, Afluria 3532-7373(PF) 45 mcg (15 mcg x 3)/0.5 mL intramuscular syringe, Disp: , Rfl:     fluticasone (FLONASE ALLERGY RELIEF) 50 mcg/actuation nasal spray, Flonase Allergy Relief,  Disp: , Rfl:     FLUZONE QUAD 6493-3756, PF, 60 mcg (15 mcg x 4)/0.5 mL Syrg, TO BE ADMINISTERED BY PHARMACIST FOR IMMUNIZATION, Disp: , Rfl: 0    guaiFENesin (MUCINEX) 600 mg 12 hr tablet, Mucinex 600 mg tablet, extended release  Take 1 tablet every 12 hours by oral route as directed., Disp: , Rfl:     hydrochlorothiazide (HYDRODIURIL) 25 MG tablet, Take 25 mg by mouth once daily., Disp: , Rfl:     hydrocodone-acetaminophen (HYCET) solution 7.5-325 mg/15mL, hydrocodone 7.5 mg-acetaminophen 325 mg/15 mL oral solution  TK 15ML Q 4 H PRN, Disp: , Rfl:     hydrOXYzine HCl (ATARAX) 25 MG tablet, Take 25 mg by mouth once daily., Disp: , Rfl: 6    ketoconazole (NIZORAL) 2 % cream, APPLY TWICE DAILY TO RASH., Disp: , Rfl: 3    ketoconazole (NIZORAL) 2 % shampoo, ketoconazole 2 % shampoo, Disp: , Rfl:     levothyroxine (SYNTHROID) 150 MCG tablet, Take one tablet daily except on sundays take 2 pills, Disp: 35 tablet, Rfl: 6    loratadine (CLARITIN) 10 mg tablet, Claritin 10 mg tablet  Take 1 tablet every day by oral route., Disp: , Rfl:     medroxyPROGESTERone (DEPO-PROVERA) 150 mg/mL injection, Inject 150 mg into the muscle every 3 (three) months., Disp: , Rfl:     menthol (BIOFREEZE, MENTHOL,) 4 % Gel, 5 mLs., Disp: , Rfl:     metFORMIN (GLUCOPHAGE) 500 MG tablet, Take 500 mg by mouth 2 (two) times daily with meals., Disp: , Rfl:     montelukast (SINGULAIR) 10 mg tablet, montelukast 10 mg tablet  TAKE 1 TABLET(S) EVERY DAY BY ORAL ROUTE., Disp: , Rfl:     multivitamin capsule, Take 1 capsule by mouth once daily., Disp: , Rfl:     nystatin (MYCOSTATIN) ointment, nystatin 100,000 unit/gram topical ointment, Disp: , Rfl:     oxyCODONE-acetaminophen (PERCOCET) 5-325 mg per tablet, Take 1 tablet by mouth every 6 (six) hours as needed for Pain. [id number], Disp: 20 tablet, Rfl: 0    phenazopyridine (PYRIDIUM) 200 MG tablet, phenazopyridine 200 mg tablet, Disp: , Rfl:     predniSONE (DELTASONE) 20 MG tablet,  prednisone 20 mg tablet  TAKE 1 TABLET BY MOUTH TWICE A DAY FOR 5 DAYS, Disp: , Rfl:     quinapril (ACCUPRIL) 10 MG tablet, quinapril 10 mg tablet  TAKE 1 TABLET BY MOUTH EVERY DAY, Disp: , Rfl:     sertraline (ZOLOFT) 50 MG tablet, sertraline 100 mg tablet  TAKE 1 TABLET BY MOUTH EVERY DAY, Disp: , Rfl:     sertraline (ZOLOFT) 50 MG tablet, Take 50 mg by mouth once daily., Disp: , Rfl: 3    traMADol (ULTRAM) 50 mg tablet, tramadol 50 mg tablet  TAKE 1-2 TABLETS BY MOUTH EVERY 6 HOURS AS NEEDED FOR PAIN, Disp: , Rfl:     valsartan (DIOVAN) 80 MG tablet, Take 80 mg by mouth once daily., Disp: , Rfl:     tamsulosin (FLOMAX) 0.4 mg Cap, Take 1 capsule (0.4 mg total) by mouth after dinner. To promote stone passage, Disp: 30 capsule, Rfl: 1  ALLERGIES:   Review of patient's allergies indicates:   Allergen Reactions    Ace inhibitors Other (See Comments) and Anaphylaxis     cough    Amoxicillin Nausea And Vomiting    Ibuprofen Swelling    Meloxicam Swelling    Nitrofurantoin monohyd/m-cryst Nausea And Vomiting    Aspirin Hives and Rash    Cefuroxime axetil Hives and Rash    Clindamycin Rash    Minocycline Rash       Review of Systems   Constitution: Negative. Negative for chills, fever and night sweats.   HENT: Negative for congestion and headaches.    Eyes: Negative for blurred vision, left vision loss and right vision loss.   Cardiovascular: Negative for chest pain and syncope.   Respiratory: Negative for cough and shortness of breath.    Endocrine: Negative for polydipsia, polyphagia and polyuria.   Hematologic/Lymphatic: Negative for bleeding problem. Does not bruise/bleed easily.   Skin: Negative for dry skin, itching and rash.   Musculoskeletal: Negative for falls and muscle weakness.   Gastrointestinal: Negative for abdominal pain and bowel incontinence.   Genitourinary: Negative for bladder incontinence and nocturia.   Neurological: Negative for disturbances in coordination, loss of balance and  seizures.   Psychiatric/Behavioral: Negative for depression. The patient does not have insomnia.    Allergic/Immunologic: Negative for hives and persistent infections.     PHYSICAL EXAM:  GEN: A&Ox3, WD WN NAD  HEENT: WNL  CHEST: CTAB, no W/R/R  HEART: RRR, no M/R/G   ABD: Soft, NT ND, BS x4 QUADS  MS: Refer to previous note for detailed MS exam  NEURO: CN II-XII intact       The surgical consent was then reviewed with the patient, who agreed with all the contents of the consent form and it was signed. she was then given the [facility] surgery packet to bring with her to [facility] for the anesthesia portion of her perioperative paperwork.     PHYSICAL THERAPY:  She was also instructed regarding physical therapy and will begin POD # 1-3 at [facility] in [place].    POST OP CARE:instructions were reviewed including care of the wound and dressing after surgery and when she can shower.     PAIN MANAGEMENT: Ailyn Yanes was also given a pain management regime, which includes the TENS unit which she has already. Please place TENS pads on patient.  She was also instructed regarding the Polar ice unit that will be in place after surgery and her postoperative pain medications.     PAIN MEDICATION:  Norco 10/325mg 1 po q 4-6 hours prn pain  Phenergan 25 mg one p.o. q.4-6 hours p.r.n. nausea and vomiting.  Lovenox 40mg subQ x 10 days post-op  Patient cannot take ASA because of allergy      DVT prophylaxis was discussed with the patient today including risk factors for developing DVTs and history of DVTs. The patient was asked if any specific recommendations were given from the doctor/s that did pre-operative surgical clearance.      Patient was asked if they were taking or using OCP pills or devices. If they answered yes, then they were instructed to stop using OCPs at this pre-operative appointment until 2 months post-op to help prevent DVT development. They understand that there are other forms of birth control that do  not involve hormones. They expressed understanding that ignoring/not following this instruction could result in a DVT which could turn into a deadly pulmonary embolism.      The patient was told that narcotic pain medications may make them drowsy and instructions were given to not sign legal documents, drive or operate heavy machinery, cars, or equipment while under the influence of narcotic medications.     As there were no other questions to be asked, she was given my business card along with Hazel Wolfe MD business card if she has any questions or concerns prior to surgery or in the postop period.

## 2019-10-18 NOTE — H&P
"Ailyn Yanes  is here for a completion of her perioperative paperwork. she  Is scheduled to undergo right              a. Knee arthroscopic lateral plica excision              b. Knee arthroscopic possible chondroplasty              c. Knee arthroscopic possible NATHEN              d. Knee arthroscopic possible medial/lateral meniscectomy on 10/24/19.  She is a healthy individual and does need clearance for this procedure.   Consuelo Kan NP, at [facility], stated that " She is medically cleared for knee surgery and is low risk for complications."    Risks, indications and benefits of the surgical procedure were discussed with the patient. All questions with regard to surgery, rehab, expected return to functional activities, activities of daily living and recreational endeavors were answered to her satisfaction.    Patient was informed and understands the risks of surgery are greater for patients with a current condition or history of heart disease, obesity, clotting disorders, recurrent infections, steroid use, current or past smoking, and factors such as sedentary lifestyle and noncompliance with medications, therapy or follow-up. The degree of the increased risk is hard to estimate with any degree of precision.    Once no other questions were asked, a brief history and physical exam was then performed.    PAST MEDICAL HISTORY:   Past Medical History:   Diagnosis Date    Allergy     Hypertension     Kidney stone     Prediabetes     Thyroid disease      PAST SURGICAL HISTORY:   Past Surgical History:   Procedure Laterality Date     SECTION      KIDNEY STONE SURGERY Left 2004    TONSILLECTOMY       FAMILY HISTORY:   Family History   Problem Relation Age of Onset    Hypertension Mother     Cancer Mother     Diabetes Mother     Diabetes Father     Hypertension Father     Hyperlipidemia Father     Heart disease Father     Diabetes Brother     Hypertension Brother     Heart disease " Paternal Grandfather     Cancer Maternal Grandmother     Hypertension Maternal Grandmother     Heart disease Maternal Grandmother     Diabetes Maternal Grandfather     Heart disease Paternal Grandmother     Melanoma Neg Hx      SOCIAL HISTORY:   Social History     Socioeconomic History    Marital status:      Spouse name: Not on file    Number of children: Not on file    Years of education: Not on file    Highest education level: Not on file   Occupational History    Not on file   Social Needs    Financial resource strain: Not on file    Food insecurity:     Worry: Not on file     Inability: Not on file    Transportation needs:     Medical: Not on file     Non-medical: Not on file   Tobacco Use    Smoking status: Former Smoker     Last attempt to quit: 1993     Years since quittin.8    Smokeless tobacco: Never Used   Substance and Sexual Activity    Alcohol use: No    Drug use: Not on file    Sexual activity: Yes     Partners: Female   Lifestyle    Physical activity:     Days per week: Not on file     Minutes per session: Not on file    Stress: Not on file   Relationships    Social connections:     Talks on phone: Not on file     Gets together: Not on file     Attends Worship service: Not on file     Active member of club or organization: Not on file     Attends meetings of clubs or organizations: Not on file     Relationship status: Not on file   Other Topics Concern    Are you pregnant or think you may be? Not Asked    Breast-feeding Not Asked   Social History Narrative    Not on file       MEDICATIONS:   Current Outpatient Medications:     amitriptyline (ELAVIL) 25 MG tablet, amitriptyline 25 mg tablet  TAKE 1 TABLET BY MOUTH EVERY DAY, Disp: , Rfl:     amoxicillin (AMOXIL) 500 MG capsule, amoxicillin 500 mg capsule  TAKE ONE CAPSULE BY MOUTH TWICE A DAY, Disp: , Rfl:     atenolol (TENORMIN) 50 MG tablet, 50 mg., Disp: , Rfl:     betamethasone dipropionate  (DIPROLENE) 0.05 % cream, betamethasone dipropionate 0.05 % topical cream, Disp: , Rfl:     budesonide (PULMICORT) 0.5 mg/2 mL nebulizer solution, budesonide 0.5 mg/2 mL suspension for nebulization  daily, Disp: , Rfl:     cholecalciferol, vitamin D3, (VITAMIN D3) 2,000 unit Cap, Vitamin D3 2,000 unit capsule  Take 1 capsule every day by oral route., Disp: , Rfl:     ciprofloxacin HCl (CIPRO) 500 MG tablet, Cipro 500 mg tablet  Take 1 tablet every 12 hours by oral route for 7 days., Disp: , Rfl:     clindamycin (CLEOCIN) 75 mg/5 mL SolR, clindamycin 75 mg/5 mL oral solution  TAKE 10ML BY MOUTH 3 TIMES A DAY, Disp: , Rfl:     clobetasol (TEMOVATE) 0.05 % cream, APPLY A THIN LAYER TO THE AFFECTED AREA(S) BY TOPICAL ROUTE 2 TIMES PER DAY, Disp: , Rfl: 1    cloNIDine (CATAPRES) 0.1 MG tablet, Take one tablet at bedtime, Disp: 30 tablet, Rfl: 6    doxepin (SINEQUAN) 25 MG capsule, TAKE 1 CAPSULE BY MOUTH EVERYDAY AT BEDTIME, Disp: , Rfl: 3    doxycycline (VIBRA-TABS) 100 MG tablet, doxycycline hyclate 100 mg tablet, Disp: , Rfl:     esomeprazole (NEXIUM) 40 MG capsule, TAKE 1 CAPSULE (40 MG TOTAL) BY MOUTH BEFORE DINNER., Disp: 90 capsule, Rfl: 2    fexofenadine (ALLEGRA) 180 MG tablet, Allergy Relief (fexofenadine) 180 mg tablet  TAKE 1 TABLET BY MOUTH EVERY DAY, Disp: , Rfl:     fexofenadine-pseudoephedrine  mg (ALLEGRA-D 12 HOUR)  mg per tablet, Allegra-D 12 Hour  1 po daily, Disp: , Rfl:     flu vac qs 2016,4 yr up,CD,PF, (FLUCELVAX QUAD 4977-4322, PF,) 60 mcg (15 mcg x 4)/0.5 mL Syrg, Flucelvax Quad 3229-2718 (PF) 60 mcg (15 mcg x 4)/0.5 mL IM syringe  TO BE ADMINISTERED BY PHARMACIST FOR IMMUNIZATION, Disp: , Rfl:     flu vac ts 2016-17,4 yr up,/PF (FLU VAC TS 2014-15,36MOS+,,PF,) 45 mcg (15 mcg x 3)/0.5 mL, Afluria 6200-6783(PF) 45 mcg (15 mcg x 3)/0.5 mL intramuscular syringe, Disp: , Rfl:     fluticasone (FLONASE ALLERGY RELIEF) 50 mcg/actuation nasal spray, Flonase Allergy Relief,  Disp: , Rfl:     FLUZONE QUAD 6725-5521, PF, 60 mcg (15 mcg x 4)/0.5 mL Syrg, TO BE ADMINISTERED BY PHARMACIST FOR IMMUNIZATION, Disp: , Rfl: 0    guaiFENesin (MUCINEX) 600 mg 12 hr tablet, Mucinex 600 mg tablet, extended release  Take 1 tablet every 12 hours by oral route as directed., Disp: , Rfl:     hydrochlorothiazide (HYDRODIURIL) 25 MG tablet, Take 25 mg by mouth once daily., Disp: , Rfl:     hydrocodone-acetaminophen (HYCET) solution 7.5-325 mg/15mL, hydrocodone 7.5 mg-acetaminophen 325 mg/15 mL oral solution  TK 15ML Q 4 H PRN, Disp: , Rfl:     hydrOXYzine HCl (ATARAX) 25 MG tablet, Take 25 mg by mouth once daily., Disp: , Rfl: 6    ketoconazole (NIZORAL) 2 % cream, APPLY TWICE DAILY TO RASH., Disp: , Rfl: 3    ketoconazole (NIZORAL) 2 % shampoo, ketoconazole 2 % shampoo, Disp: , Rfl:     levothyroxine (SYNTHROID) 150 MCG tablet, Take one tablet daily except on sundays take 2 pills, Disp: 35 tablet, Rfl: 6    loratadine (CLARITIN) 10 mg tablet, Claritin 10 mg tablet  Take 1 tablet every day by oral route., Disp: , Rfl:     medroxyPROGESTERone (DEPO-PROVERA) 150 mg/mL injection, Inject 150 mg into the muscle every 3 (three) months., Disp: , Rfl:     menthol (BIOFREEZE, MENTHOL,) 4 % Gel, 5 mLs., Disp: , Rfl:     metFORMIN (GLUCOPHAGE) 500 MG tablet, Take 500 mg by mouth 2 (two) times daily with meals., Disp: , Rfl:     montelukast (SINGULAIR) 10 mg tablet, montelukast 10 mg tablet  TAKE 1 TABLET(S) EVERY DAY BY ORAL ROUTE., Disp: , Rfl:     multivitamin capsule, Take 1 capsule by mouth once daily., Disp: , Rfl:     nystatin (MYCOSTATIN) ointment, nystatin 100,000 unit/gram topical ointment, Disp: , Rfl:     oxyCODONE-acetaminophen (PERCOCET) 5-325 mg per tablet, Take 1 tablet by mouth every 6 (six) hours as needed for Pain. [id number], Disp: 20 tablet, Rfl: 0    phenazopyridine (PYRIDIUM) 200 MG tablet, phenazopyridine 200 mg tablet, Disp: , Rfl:     predniSONE (DELTASONE) 20 MG tablet,  prednisone 20 mg tablet  TAKE 1 TABLET BY MOUTH TWICE A DAY FOR 5 DAYS, Disp: , Rfl:     quinapril (ACCUPRIL) 10 MG tablet, quinapril 10 mg tablet  TAKE 1 TABLET BY MOUTH EVERY DAY, Disp: , Rfl:     sertraline (ZOLOFT) 50 MG tablet, sertraline 100 mg tablet  TAKE 1 TABLET BY MOUTH EVERY DAY, Disp: , Rfl:     sertraline (ZOLOFT) 50 MG tablet, Take 50 mg by mouth once daily., Disp: , Rfl: 3    traMADol (ULTRAM) 50 mg tablet, tramadol 50 mg tablet  TAKE 1-2 TABLETS BY MOUTH EVERY 6 HOURS AS NEEDED FOR PAIN, Disp: , Rfl:     valsartan (DIOVAN) 80 MG tablet, Take 80 mg by mouth once daily., Disp: , Rfl:     tamsulosin (FLOMAX) 0.4 mg Cap, Take 1 capsule (0.4 mg total) by mouth after dinner. To promote stone passage, Disp: 30 capsule, Rfl: 1  ALLERGIES:   Review of patient's allergies indicates:   Allergen Reactions    Ace inhibitors Other (See Comments) and Anaphylaxis     cough    Amoxicillin Nausea And Vomiting    Ibuprofen Swelling    Meloxicam Swelling    Nitrofurantoin monohyd/m-cryst Nausea And Vomiting    Aspirin Hives and Rash    Cefuroxime axetil Hives and Rash    Clindamycin Rash    Minocycline Rash       Review of Systems   Constitution: Negative. Negative for chills, fever and night sweats.   HENT: Negative for congestion and headaches.    Eyes: Negative for blurred vision, left vision loss and right vision loss.   Cardiovascular: Negative for chest pain and syncope.   Respiratory: Negative for cough and shortness of breath.    Endocrine: Negative for polydipsia, polyphagia and polyuria.   Hematologic/Lymphatic: Negative for bleeding problem. Does not bruise/bleed easily.   Skin: Negative for dry skin, itching and rash.   Musculoskeletal: Negative for falls and muscle weakness.   Gastrointestinal: Negative for abdominal pain and bowel incontinence.   Genitourinary: Negative for bladder incontinence and nocturia.   Neurological: Negative for disturbances in coordination, loss of balance and  seizures.   Psychiatric/Behavioral: Negative for depression. The patient does not have insomnia.    Allergic/Immunologic: Negative for hives and persistent infections.     PHYSICAL EXAM:  GEN: A&Ox3, WD WN NAD  HEENT: WNL  CHEST: CTAB, no W/R/R  HEART: RRR, no M/R/G   ABD: Soft, NT ND, BS x4 QUADS  MS: Refer to previous note for detailed MS exam  NEURO: CN II-XII intact       The surgical consent was then reviewed with the patient, who agreed with all the contents of the consent form and it was signed. she was then given the [facility] [place] surgery packet to bring with her to [facility] [place] for the anesthesia portion of her perioperative paperwork.     PHYSICAL THERAPY:  She was also instructed regarding physical therapy and will begin POD # 1-3 at [facility] in [place].    POST OP CARE:instructions were reviewed including care of the wound and dressing after surgery and when she can shower.     PAIN MANAGEMENT: Ailyn Yanes was also given a pain management regime, which includes the TENS unit which she has already. Please place TENS pads on patient.  She was also instructed regarding the Polar ice unit that will be in place after surgery and her postoperative pain medications.     PAIN MEDICATION:  Norco 10/325mg 1 po q 4-6 hours prn pain  Phenergan 25 mg one p.o. q.4-6 hours p.r.n. nausea and vomiting.  Lovenox 40mg subQ x 10 days post-op  Patient cannot take ASA because of allergy      DVT prophylaxis was discussed with the patient today including risk factors for developing DVTs and history of DVTs. The patient was asked if any specific recommendations were given from the doctor/s that did pre-operative surgical clearance.      Patient was asked if they were taking or using OCP pills or devices. If they answered yes, then they were instructed to stop using OCPs at this pre-operative appointment until 2 months post-op to help prevent DVT development. They understand that there are other forms of birth control that do  not involve hormones. They expressed understanding that ignoring/not following this instruction could result in a DVT which could turn into a deadly pulmonary embolism.      The patient was told that narcotic pain medications may make them drowsy and instructions were given to not sign legal documents, drive or operate heavy machinery, cars, or equipment while under the influence of narcotic medications.     As there were no other questions to be asked, she was given my business card along with Hazel Wolfe MD business card if she has any questions or concerns prior to surgery or in the postop period.

## 2019-10-19 ENCOUNTER — PATIENT MESSAGE (OUTPATIENT)
Dept: SPORTS MEDICINE | Facility: CLINIC | Age: 49
End: 2019-10-19

## 2019-10-21 ENCOUNTER — ANESTHESIA EVENT (OUTPATIENT)
Dept: SURGERY | Facility: HOSPITAL | Age: 49
End: 2019-10-21
Payer: COMMERCIAL

## 2019-10-21 DIAGNOSIS — M67.51 PLICA SYNDROME, RIGHT KNEE: Primary | ICD-10-CM

## 2019-10-21 RX ORDER — ENOXAPARIN SODIUM 100 MG/ML
40 INJECTION SUBCUTANEOUS DAILY
Qty: 4 ML | Refills: 0 | Status: SHIPPED | OUTPATIENT
Start: 2019-10-21 | End: 2020-02-05

## 2019-10-21 NOTE — PLAN OF CARE
Patient instructions given per surgical and medical guidelines. NPO status reviewed with patient. Patient verbalized understanding of both food and fluid intake prior to surgery.  Antibacterial scrub instructions given per MD recommendations.  All questions answered.  Driving directions given for day of surgery.

## 2019-10-21 NOTE — PRE-PROCEDURE INSTRUCTIONS
"Anesthesia Assessment: Preoperative EQUATION    Planned Procedure: Procedure(s) (LRB):  SYNOVECTOMY, KNEE (Right)  CHONDROPLASTY, KNEE (Right)  ARTHROSCOPY, KNEE, Lysis of Adhesions (Right)  Requested Anesthesia Type:General  Surgeon: Hazel Wolfe MD  Service: Orthopedics  Known or anticipated Date of Surgery:10/24/2019    Surgeon notes: reviewed, previous surgery 2015, septoplasty    Electronic QUestionnaire Assessment completed via nurse interview with patient.      Triage considerations:     The patient has no apparent active cardiac condition (No unstable coronary Syndrome such as severe unstable angina or recent [<1 month] myocardial infarction, decompensated CHF, severe valvular   disease or significant arrhythmia)    Previous anesthesia records:[facility]MA General     Placement Date: 02/27/15 Placement Time: 0743 Method of Intubation: Direct laryngoscopy Inserted by: CRNA Airway Device: Oral Margy Mask Ventilation: Easy Intubated: Postinduction Blade: Elizalde #2 Airway Device Size: 7.0 Style: Cuffed Cuff Inflation: Minimal occlusive pressure Removal Date: 02/27/15 Removal Time: 0743    Last PCP note: within 1 month       Consuelo Kan NP, at [facility], stated that " She is medically cleared for knee surgery and is low risk for complications."     Subspecialty notes: Endocrinology    Other important co-morbidities: HTN, Kidney, Diabetes,thyoid dysfunction   Tests already available:  Results have been reviewed.            Instructions Patient instructions given per surgical and medical guidelines. NPO status reviewed with patient. Patient verbalized understanding of both food and fluid intake prior to surgery.  Antibacterial scrub instructions given per MD recommendations.  All questions answered.  Driving directions given for day of surgery.  .      Navigation:  Straight Line to surgery.                          Plans per surgeon and Follow-up per Surgeon  "

## 2019-10-21 NOTE — ANESTHESIA PREPROCEDURE EVALUATION
10/21/2019  Ailyn Yanes is a 49 y.o., female.    Anesthesia Evaluation    I have reviewed the Patient Summary Reports.    I have reviewed the Nursing Notes.   I have reviewed the Medications.     Review of Systems  Anesthesia Hx:  No problems with previous Anesthesia Denies Hx of Anesthetic complications  History of prior surgery of interest to airway management or planning: Previous anesthesia: General 2015 with general anesthesia.  Airway issues documented on chart review include easy direct laryngoscopy    Social:  Non-Smoker, No Alcohol Use  Denies Tobacco Use. Denies Alcohol Use.   Hematology/Oncology:  Hematology Normal   Oncology Normal     EENT/Dental:  EENT/Dental Normal Denies Eye Symptoms   Denies ear symptoms  Denies Active Dental Problems    Cardiovascular:   Hypertension  Functional Capacity 4 METS   Denies Congenital Heart Disease.    Denies Congenital Heart Disease.   Hypertension    Pulmonary:  Pulmonary Normal  Denies Asthma.  Denies Pulmonary Infection.    Renal/:  Denies Kidney Function/Disease    Hepatic/GI:   GERD    Musculoskeletal:  Musculoskeletal Normal    Neurological:  Neurology Normal    Endocrine:   Diabetes Hypothyroidism  Diabetes  Thyroid Disease Hypothyroidism   Denies Pituitary Disease  Denies Adrenal Disease    Dermatological:  Denies Skin Symptoms/Problems   Psych:  Psychiatric Normal           Physical Exam  General:  Well nourished    Airway/Jaw/Neck:  Airway Findings: Mouth Opening: Normal Tongue: Normal  General Airway Assessment: Adult  Mallampati: I  Jaw/Neck Findings:  Neck ROM: Normal ROM      Dental:  Dental Findings: In tact   Chest/Lungs:  Chest/Lungs Findings: Clear to auscultation     Heart/Vascular:  Heart Findings: Rate: Normal  Rhythm: Regular Rhythm  Sounds: Normal        Mental Status:  Mental Status Findings:  Cooperative         Anesthesia  Plan  Type of Anesthesia, risks & benefits discussed:  Anesthesia Type:  general  Patient's Preference:   Intra-op Monitoring Plan:   Intra-op Monitoring Plan Comments:   Post Op Pain Control Plan:   Post Op Pain Control Plan Comments:   Induction:   IV  Beta Blocker:  Patient is not currently on a Beta-Blocker (No further documentation required).       Informed Consent: Patient understands risks and agrees with Anesthesia plan.  Questions answered. Anesthesia consent signed with patient.  ASA Score: 2     Day of Surgery Review of History & Physical:    H&P update referred to the surgeon.         Ready For Surgery From Anesthesia Perspective.

## 2019-10-24 ENCOUNTER — ANESTHESIA (OUTPATIENT)
Dept: SURGERY | Facility: HOSPITAL | Age: 49
End: 2019-10-24
Payer: COMMERCIAL

## 2019-10-24 ENCOUNTER — HOSPITAL ENCOUNTER (OUTPATIENT)
Facility: HOSPITAL | Age: 49
Discharge: HOME | End: 2019-10-24
Attending: ORTHOPAEDIC SURGERY | Admitting: ORTHOPAEDIC SURGERY
Payer: COMMERCIAL

## 2019-10-24 DIAGNOSIS — M67.51 PLICA SYNDROME, RIGHT KNEE: ICD-10-CM

## 2019-10-24 DIAGNOSIS — M94.261 CHONDROMALACIA OF RIGHT KNEE: ICD-10-CM

## 2019-10-24 LAB
B-HCG UR QL: NEGATIVE
CTP QC/QA: YES
GLUCOSE SERPL-MCNC: 103 MG/DL (ref 70–110)
POCT GLUCOSE: 103 MG/DL (ref 70–110)
POCT GLUCOSE: 120 MG/DL (ref 70–110)

## 2019-10-24 PROCEDURE — 63600175 PHARM REV CODE 636 W HCPCS: Performed by: PHYSICIAN ASSISTANT

## 2019-10-24 PROCEDURE — 37000009 HC ANESTHESIA EA ADD 15 MINS: Performed by: ORTHOPAEDIC SURGERY

## 2019-10-24 PROCEDURE — 25000003 PHARM REV CODE 250: Performed by: PHYSICIAN ASSISTANT

## 2019-10-24 PROCEDURE — 94761 N-INVAS EAR/PLS OXIMETRY MLT: CPT

## 2019-10-24 PROCEDURE — 27201423 OPTIME MED/SURG SUP & DEVICES STERILE SUPPLY: Performed by: ORTHOPAEDIC SURGERY

## 2019-10-24 PROCEDURE — 25000242 PHARM REV CODE 250 ALT 637 W/ HCPCS: Performed by: NURSE ANESTHETIST, CERTIFIED REGISTERED

## 2019-10-24 PROCEDURE — 63600175 PHARM REV CODE 636 W HCPCS

## 2019-10-24 PROCEDURE — D9220A PRA ANESTHESIA: Mod: CRNA,,, | Performed by: NURSE ANESTHETIST, CERTIFIED REGISTERED

## 2019-10-24 PROCEDURE — 36000710: Performed by: ORTHOPAEDIC SURGERY

## 2019-10-24 PROCEDURE — 37000008 HC ANESTHESIA 1ST 15 MINUTES: Performed by: ORTHOPAEDIC SURGERY

## 2019-10-24 PROCEDURE — 63600175 PHARM REV CODE 636 W HCPCS: Performed by: NURSE ANESTHETIST, CERTIFIED REGISTERED

## 2019-10-24 PROCEDURE — 81025 URINE PREGNANCY TEST: CPT | Performed by: ANESTHESIOLOGY

## 2019-10-24 PROCEDURE — 29881 ARTHRS KNE SRG MNISECTMY M/L: CPT | Mod: RT,,, | Performed by: ORTHOPAEDIC SURGERY

## 2019-10-24 PROCEDURE — 25000003 PHARM REV CODE 250: Performed by: ORTHOPAEDIC SURGERY

## 2019-10-24 PROCEDURE — 29881 ARTHRS KNE SRG MNISECTMY M/L: CPT | Mod: AS,RT,, | Performed by: PHYSICIAN ASSISTANT

## 2019-10-24 PROCEDURE — 29881 PR KNEE SCOPE SINGLE MENISECECTOMY: ICD-10-PCS | Mod: RT,,, | Performed by: ORTHOPAEDIC SURGERY

## 2019-10-24 PROCEDURE — 29881 PR KNEE SCOPE SINGLE MENISECECTOMY: ICD-10-PCS | Mod: AS,RT,, | Performed by: PHYSICIAN ASSISTANT

## 2019-10-24 PROCEDURE — 82962 GLUCOSE BLOOD TEST: CPT | Performed by: ORTHOPAEDIC SURGERY

## 2019-10-24 PROCEDURE — 25000003 PHARM REV CODE 250: Performed by: NURSE ANESTHETIST, CERTIFIED REGISTERED

## 2019-10-24 PROCEDURE — 71000015 HC POSTOP RECOV 1ST HR: Performed by: ORTHOPAEDIC SURGERY

## 2019-10-24 PROCEDURE — 36000711: Performed by: ORTHOPAEDIC SURGERY

## 2019-10-24 PROCEDURE — 99900035 HC TECH TIME PER 15 MIN (STAT)

## 2019-10-24 PROCEDURE — D9220A PRA ANESTHESIA: Mod: ANES,,, | Performed by: ANESTHESIOLOGY

## 2019-10-24 PROCEDURE — 71000033 HC RECOVERY, INTIAL HOUR: Performed by: ORTHOPAEDIC SURGERY

## 2019-10-24 PROCEDURE — 63600175 PHARM REV CODE 636 W HCPCS: Performed by: ORTHOPAEDIC SURGERY

## 2019-10-24 PROCEDURE — D9220A PRA ANESTHESIA: ICD-10-PCS | Mod: ANES,,, | Performed by: ANESTHESIOLOGY

## 2019-10-24 PROCEDURE — D9220A PRA ANESTHESIA: ICD-10-PCS | Mod: CRNA,,, | Performed by: NURSE ANESTHETIST, CERTIFIED REGISTERED

## 2019-10-24 RX ORDER — EPINEPHRINE 1 MG/ML
INJECTION, SOLUTION INTRACARDIAC; INTRAMUSCULAR; INTRAVENOUS; SUBCUTANEOUS
Status: DISCONTINUED | OUTPATIENT
Start: 2019-10-24 | End: 2019-10-24 | Stop reason: HOSPADM

## 2019-10-24 RX ORDER — FENTANYL CITRATE 50 UG/ML
INJECTION, SOLUTION INTRAMUSCULAR; INTRAVENOUS
Status: DISCONTINUED | OUTPATIENT
Start: 2019-10-24 | End: 2019-10-24

## 2019-10-24 RX ORDER — TRAMADOL HYDROCHLORIDE 50 MG/1
100 TABLET ORAL ONCE
Status: DISCONTINUED | OUTPATIENT
Start: 2019-10-24 | End: 2019-10-24 | Stop reason: HOSPADM

## 2019-10-24 RX ORDER — ROCURONIUM BROMIDE 10 MG/ML
INJECTION, SOLUTION INTRAVENOUS
Status: DISCONTINUED | OUTPATIENT
Start: 2019-10-24 | End: 2019-10-24

## 2019-10-24 RX ORDER — KETOROLAC TROMETHAMINE 30 MG/ML
INJECTION, SOLUTION INTRAMUSCULAR; INTRAVENOUS
Status: DISCONTINUED | OUTPATIENT
Start: 2019-10-24 | End: 2019-10-24 | Stop reason: HOSPADM

## 2019-10-24 RX ORDER — OXYCODONE HYDROCHLORIDE 5 MG/1
5 TABLET ORAL
Status: DISCONTINUED | OUTPATIENT
Start: 2019-10-24 | End: 2019-10-24 | Stop reason: HOSPADM

## 2019-10-24 RX ORDER — PROPOFOL 10 MG/ML
VIAL (ML) INTRAVENOUS
Status: DISCONTINUED | OUTPATIENT
Start: 2019-10-24 | End: 2019-10-24

## 2019-10-24 RX ORDER — ONDANSETRON 2 MG/ML
4 INJECTION INTRAMUSCULAR; INTRAVENOUS EVERY 12 HOURS PRN
Status: DISCONTINUED | OUTPATIENT
Start: 2019-10-24 | End: 2019-10-24 | Stop reason: HOSPADM

## 2019-10-24 RX ORDER — MIDAZOLAM HYDROCHLORIDE 1 MG/ML
INJECTION, SOLUTION INTRAMUSCULAR; INTRAVENOUS
Status: DISCONTINUED | OUTPATIENT
Start: 2019-10-24 | End: 2019-10-24

## 2019-10-24 RX ORDER — NEOSTIGMINE METHYLSULFATE 1 MG/ML
INJECTION, SOLUTION INTRAVENOUS
Status: DISCONTINUED | OUTPATIENT
Start: 2019-10-24 | End: 2019-10-24

## 2019-10-24 RX ORDER — PROMETHAZINE HYDROCHLORIDE 25 MG/1
25 TABLET ORAL EVERY 6 HOURS PRN
Status: DISCONTINUED | OUTPATIENT
Start: 2019-10-24 | End: 2019-10-24 | Stop reason: HOSPADM

## 2019-10-24 RX ORDER — OXYCODONE HYDROCHLORIDE 5 MG/1
TABLET ORAL
Status: DISCONTINUED
Start: 2019-10-24 | End: 2019-10-24 | Stop reason: HOSPADM

## 2019-10-24 RX ORDER — GLYCOPYRROLATE 0.2 MG/ML
INJECTION INTRAMUSCULAR; INTRAVENOUS
Status: DISCONTINUED | OUTPATIENT
Start: 2019-10-24 | End: 2019-10-24

## 2019-10-24 RX ORDER — DEXAMETHASONE SODIUM PHOSPHATE 4 MG/ML
INJECTION, SOLUTION INTRA-ARTICULAR; INTRALESIONAL; INTRAMUSCULAR; INTRAVENOUS; SOFT TISSUE
Status: DISCONTINUED | OUTPATIENT
Start: 2019-10-24 | End: 2019-10-24

## 2019-10-24 RX ORDER — MORPHINE SULFATE 2 MG/ML
2 INJECTION, SOLUTION INTRAMUSCULAR; INTRAVENOUS EVERY 10 MIN PRN
Status: DISCONTINUED | OUTPATIENT
Start: 2019-10-24 | End: 2019-10-24 | Stop reason: HOSPADM

## 2019-10-24 RX ORDER — ONDANSETRON 2 MG/ML
INJECTION INTRAMUSCULAR; INTRAVENOUS
Status: COMPLETED
Start: 2019-10-24 | End: 2019-10-24

## 2019-10-24 RX ORDER — OXYCODONE HYDROCHLORIDE 5 MG/1
10 TABLET ORAL EVERY 4 HOURS PRN
Status: DISCONTINUED | OUTPATIENT
Start: 2019-10-24 | End: 2019-10-24 | Stop reason: HOSPADM

## 2019-10-24 RX ORDER — FENTANYL CITRATE 50 UG/ML
25 INJECTION, SOLUTION INTRAMUSCULAR; INTRAVENOUS EVERY 5 MIN PRN
Status: DISCONTINUED | OUTPATIENT
Start: 2019-10-24 | End: 2019-10-24 | Stop reason: HOSPADM

## 2019-10-24 RX ORDER — ONDANSETRON 2 MG/ML
INJECTION INTRAMUSCULAR; INTRAVENOUS
Status: DISCONTINUED | OUTPATIENT
Start: 2019-10-24 | End: 2019-10-24

## 2019-10-24 RX ORDER — ROPIVACAINE HYDROCHLORIDE 5 MG/ML
INJECTION, SOLUTION EPIDURAL; INFILTRATION; PERINEURAL
Status: DISCONTINUED | OUTPATIENT
Start: 2019-10-24 | End: 2019-10-24 | Stop reason: HOSPADM

## 2019-10-24 RX ORDER — KETAMINE HYDROCHLORIDE 100 MG/ML
INJECTION, SOLUTION INTRAMUSCULAR; INTRAVENOUS
Status: DISCONTINUED | OUTPATIENT
Start: 2019-10-24 | End: 2019-10-24

## 2019-10-24 RX ORDER — PHENYLEPHRINE HYDROCHLORIDE 10 MG/ML
INJECTION INTRAVENOUS
Status: DISCONTINUED | OUTPATIENT
Start: 2019-10-24 | End: 2019-10-24

## 2019-10-24 RX ORDER — KETAMINE HYDROCHLORIDE 50 MG/ML
INJECTION, SOLUTION INTRAMUSCULAR; INTRAVENOUS
Status: DISCONTINUED | OUTPATIENT
Start: 2019-10-24 | End: 2019-10-24 | Stop reason: HOSPADM

## 2019-10-24 RX ORDER — LIDOCAINE HCL/PF 100 MG/5ML
SYRINGE (ML) INTRAVENOUS
Status: DISCONTINUED | OUTPATIENT
Start: 2019-10-24 | End: 2019-10-24

## 2019-10-24 RX ORDER — SODIUM CHLORIDE 9 MG/ML
INJECTION, SOLUTION INTRAVENOUS CONTINUOUS
Status: DISCONTINUED | OUTPATIENT
Start: 2019-10-24 | End: 2019-10-24 | Stop reason: HOSPADM

## 2019-10-24 RX ORDER — EPHEDRINE SULFATE 50 MG/ML
INJECTION, SOLUTION INTRAVENOUS
Status: DISCONTINUED | OUTPATIENT
Start: 2019-10-24 | End: 2019-10-24

## 2019-10-24 RX ADMIN — ONDANSETRON 4 MG: 2 INJECTION INTRAMUSCULAR; INTRAVENOUS at 11:10

## 2019-10-24 RX ADMIN — PROPOFOL 50 MG: 10 INJECTION, EMULSION INTRAVENOUS at 12:10

## 2019-10-24 RX ADMIN — SODIUM CHLORIDE: 0.9 INJECTION, SOLUTION INTRAVENOUS at 10:10

## 2019-10-24 RX ADMIN — OXYCODONE HYDROCHLORIDE 10 MG: 5 TABLET ORAL at 01:10

## 2019-10-24 RX ADMIN — GLYCOPYRROLATE 0.4 MG: 0.2 INJECTION, SOLUTION INTRAMUSCULAR; INTRAVENOUS at 12:10

## 2019-10-24 RX ADMIN — EPHEDRINE SULFATE 10 MG: 50 INJECTION INTRAVENOUS at 11:10

## 2019-10-24 RX ADMIN — KETAMINE HYDROCHLORIDE 25 MG: 100 INJECTION, SOLUTION, CONCENTRATE INTRAMUSCULAR; INTRAVENOUS at 11:10

## 2019-10-24 RX ADMIN — PROPOFOL 200 MG: 10 INJECTION, EMULSION INTRAVENOUS at 11:10

## 2019-10-24 RX ADMIN — SODIUM CHLORIDE 1000 ML: 0.9 INJECTION, SOLUTION INTRAVENOUS at 09:10

## 2019-10-24 RX ADMIN — ONDANSETRON 4 MG: 2 INJECTION INTRAMUSCULAR; INTRAVENOUS at 01:10

## 2019-10-24 RX ADMIN — NEOSTIGMINE METHYLSULFATE 4 MG: 1 INJECTION INTRAVENOUS at 12:10

## 2019-10-24 RX ADMIN — MIDAZOLAM 2 MG: 1 INJECTION INTRAMUSCULAR; INTRAVENOUS at 11:10

## 2019-10-24 RX ADMIN — FENTANYL CITRATE 100 MCG: 50 INJECTION, SOLUTION INTRAMUSCULAR; INTRAVENOUS at 11:10

## 2019-10-24 RX ADMIN — ROCURONIUM BROMIDE 50 MG: 10 INJECTION, SOLUTION INTRAVENOUS at 11:10

## 2019-10-24 RX ADMIN — PHENYLEPHRINE HYDROCHLORIDE 100 MCG: 10 INJECTION INTRAVENOUS at 12:10

## 2019-10-24 RX ADMIN — ALBUTEROL SULFATE 8 PUFF: 90 AEROSOL, METERED RESPIRATORY (INHALATION) at 12:10

## 2019-10-24 RX ADMIN — VANCOMYCIN HYDROCHLORIDE 1500 MG: 1.5 INJECTION, POWDER, LYOPHILIZED, FOR SOLUTION INTRAVENOUS at 09:10

## 2019-10-24 RX ADMIN — LIDOCAINE HYDROCHLORIDE 100 MG: 20 INJECTION, SOLUTION INTRAVENOUS at 11:10

## 2019-10-24 RX ADMIN — DEXAMETHASONE SODIUM PHOSPHATE 8 MG: 4 INJECTION, SOLUTION INTRAMUSCULAR; INTRAVENOUS at 11:10

## 2019-10-24 NOTE — OP NOTE
DATE OF PROCEDURE: 10/24/2019    SURGEON:  Hazel Wolfe M.D    ASSISTANT: DANA Lomas PA-C  The use of an assistant was medically necessary for positioning, skin retraction, and assistance with this procedure. The procedure could not be performed without the use of an assistant.   There was no qualified resident/fellow available for assistance with this procedure.    PREOPERATIVE DIAGNOSES:   right  1. knee medial meniscus tear   2. knee plica.   3. knee possible chondromalacia  4. knee synovitis  5. Knee adhesions    POSTOPERATIVE DIAGNOSES:   right  1. knee medial meniscus tear   2. knee plica.   3. knee chondromalacia  4. knee synovitis.   5. Knee adhesions    PROCEDURE PERFORMED:   right  1. knee arthroscopic chondroplasty (CPT 53792)  2. knee arthroscopic medial (CPT 08675) meniscectomy   3. knee arthroscopic partial synovectomy/debridement (CPT 60761).   4. knee arthroscopic plica excision(CPT 68182).    5. Knee arthroscopic lysis of adhesions (CPT 41121)    ANESTHESIA: General with local 0.5% ropivicaine 30ml (5mg/ml), 60 mg ketamine, 60mg toradol (2ml toradol (30mg/ml))    BLOOD LOSS: Minimal.     DRAINS: None.     TOURNIQUET TIME: None.     COMPLICATIONS: None.     CONDITION ON TRANSFER: The patient was extubated and moved to the recovery room in stable condition, with compartments soft and capillary refill less than a   second in all digits.     BRIEF INDICATION OF MEDICAL NECESSITY: The patient is a 49 y.o. year-old female who has history and physical examination findings consistent with the above. Nonoperative versus operative options were discussed. The risks and benefits were discussed with the patient. The patient acknowledged understanding and wished to proceed with operative intervention. Informed consent was obtained prior to the procedure. Details of the surgical procedure were explained, including incisions and probable rehabilitation course. The patient understands the likely length of  convalescence after surgery; and we have explained the risks, benefits, and alternatives of surgery. Reasonable expectations and potential complications were discussed and acknowledged, including but not limited to infection, bleeding, blood clots, (DVT and/or PE), nerve injury, retear, instability, continued pain and stiffness. It was also explained that there was a chance of failure which may require further management. The patient agreed and understood and wished to proceed.     EXAMINATION UNDER ANESTHESIA of the OPERATIVE right KNEE: ROM 0-135 degrees, negative Lachman, negative pivot shift, stable to varus-valgus stress testing, negative effusion.     EXAMINATION UNDER ANESTHESIA of the NON-OPERATED left KNEE: ROM 0-135 degrees, negative Lachman, negative pivot shift, stable to varus-valgus stress testing, negative effusion.     PROCEDURE IN DETAIL: The correct operative site was marked by the operative surgeon.  The patient was then taken to the operating room and placed supine on the operating room table. General anesthesia was administered by the anesthesia team. All pressure points were carefully padded and checked. Preoperative antibiotics were administered. A well-padded tourniquet was placed high on the operative thigh. Examination was begun with the above findings. The non-operative leg was then placed a well-padded well-leg marrero, in a comfortable position. The operative leg was placed in an arthroscopic leg marrero at the level of the tourniquet. The operative leg was prepped and draped in the usual sterile fashion. After prepping and draping, the appropriate landmarks were noted on the skin.  2cc skin and sub-cutaneous tissue was infilttrated with local anesthetic mixture superolaterally at needle insufflation site. The knee was insufflated supero-laterally with saline. 9cc skin wheal and sub-cutaneous tissue and fat pad was infilttrated with local anesthetic mixture at both portal sites;  mid-lateral followed by infero-medial portals were created, and a systematic examination of the joint revealed the following:    There was no evidence of any suprapatellar pouch adhesions or compartmentalization.  There was no evidence of any loose bodies in the medial or lateral gutters.     In the patellofemoral compartment, there was chondral damage to:  Patella 20 x 20 mm grade 3  Chondroplasty was performed using arthroscopic shaver..    There was chondral damage to:  Lateral femoral condyle 15 x 10 mm grade 2  Lateral tibial plateau 15 x 15 mm grade 2  Chondroplasty was performed using arthroscopic shaver.    In the medial compartment there was no evidence of chondral damage.   In the medial compartment there was no evidence of meniscal instability.   Middle horn medial meniscus tear,  parrot-beak was debrided with arthroscopic shaver and biter.  10% was debrided over an area of 1 cm.  Root and hoop fibers remained intact.    Attention was then turned to the notch. The ACL and PCL were probed carefully and found to be stable.     There was a hypertrophic lateral and infrapatellar plica.  This was debrided using arthroscopic biter and shaver and thermal device    There was some scar about the ACL.  This was debrided with thermal device and lysis of adhesions was performed.    In the lateral compartment there was no evidence of meniscal damage or meniscal instability.     Synovitis was debrided in the knee as needed to the areas of concern in medial and lateral compartments.      The knee and incisions were then copiously irrigated and fluid was extravasated using suction.  The arthroscopic portal incisions were closed using inverted 4-0 Monocryl suture.  5cc skin and sub-cutaneous tissue and around portals were infilttrated with local anesthetic mixture at both portal sites.  Steri-Strips were placed with Mastisol. Sterile TENS unit pads were placed which were medically necessary for pain relief. Wounds were  dressed with Xeroform, 4x4s, and cast padding. KATHRYN hose was placed on the operative leg to match that of the KATHRYN hose placed preoperatively on the non-operative leg. Iceman was secured.  The patient was extubated and moved to the recovery room in stable condition with compartments soft and capillary refill less than a second in all digits.     POSTOPERATIVE PLAN: We will be following the arthroscopic partial meniscectomy guidelines with emphasis on patellar mobility.  This was discussed this with the patient's family after surgery.

## 2019-10-24 NOTE — ANESTHESIA POSTPROCEDURE EVALUATION
Anesthesia Post Evaluation    Patient: Ailyn Yanes    Procedure(s) Performed: Procedure(s) (LRB):  PARTIAL SYNOVECTOMY/DEBRIDEMENT; PLICA  EXCISION KNEE (Right)  CHONDROPLASTY, KNEE ARTHROSCOPIC (Right)  ARTHROSCOPY, KNEE, Lysis of Adhesions (Right)  ARTHROSCOPY, KNEE, WITH PARTIAL MEDIAL MENISCECTOMY (Right)    Final Anesthesia Type: general  Patient location during evaluation: PACU  Patient participation: Yes- Able to Participate  Level of consciousness: awake and alert  Post-procedure vital signs: reviewed and stable  Pain management: adequate  Airway patency: patent  PONV status at discharge: No PONV  Anesthetic complications: no      Cardiovascular status: blood pressure returned to baseline  Respiratory status: unassisted  Hydration status: euvolemic  Follow-up not needed.          Vitals Value Taken Time   /73 10/24/2019  1:47 PM   Temp 36.6 °C (97.8 °F) 10/24/2019  1:45 PM   Pulse 71 10/24/2019  1:58 PM   Resp 31 10/24/2019  1:58 PM   SpO2 92 % 10/24/2019  1:58 PM   Vitals shown include unvalidated device data.      Event Time     Out of Recovery 13:45:00          Pain/Brandi Score: Pain Rating Prior to Med Admin: 4 (10/24/2019  1:13 PM)  Brandi Score: 10 (10/24/2019  1:45 PM)

## 2019-10-24 NOTE — DISCHARGE INSTRUCTIONS
PATIENT INSTRUCTIONS  POST-ANESTHESIA    IMMEDIATELY FOLLOWING SURGERY:  Do not drive or operate machinery for the first twenty four hours after surgery.  Do not make any important decisions for twenty four hours after surgery or while taking narcotic pain medications or sedatives.  If you develop intractable nausea and vomiting or a severe headache please notify your doctor immediately.    FOLLOW-UP:  Please make an appointment with your surgeon as instructed. You do not need to follow up with anesthesia unless specifically instructed to do so.    WOUND CARE INSTRUCTIONS (if applicable):  Keep a dry clean dressing on the anesthesia/puncture wound site if there is drainage.  Once the wound has quit draining you may leave it open to air.  Generally you should leave the bandage intact for twenty four hours unless there is drainage.  If the epidural site drains for more than 36-48 hours please call the anesthesia department.    QUESTIONS?:  Please feel free to call your physician or the hospital  if you have any questions, and they will be happy to assist you.       [facility] Anesthesia Department  [address] [facility]  [phone]        Recovery After Procedural Sedation (Adult)  You have been given medicine by vein to make you sleep during your surgery. This may have included both a pain medicine and sleeping medicine. Most of the effects have worn off. But you may still have some drowsiness for the next 6 to 8 hours.  Home care  Follow these guidelines when you get home:  · For the next 8 hours, you should be watched by a responsible adult. This person should make sure your condition is not getting worse.  · Don't drink any alcohol for the next 24 hours.  · Don't drive, operate dangerous machinery, or make important business or personal decisions during the next 24 hours.  Note: Your healthcare provider may tell you not to take any medicine by mouth for pain or sleep in the next 4 hours.  These medicines may react with the medicines you were given in the hospital. This could cause a much stronger response than usual.  Follow-up care  Follow up with your healthcare provider if you are not alert and back to your usual level of activity within 12 hours.  When to seek medical advice  Call your healthcare provider right away if any of these occur:  · Drowsiness gets worse  · Weakness or dizziness gets worse  · Repeated vomiting  · You can't be awakened   Date Last Reviewed: 10/18/2016  © 3694-9192 [employer]. [address] [place] [address], [place] [address]. All rights reserved. This information is not intended as a substitute for professional medical care. Always follow your healthcare professional's instructions.

## 2019-10-24 NOTE — DISCHARGE SUMMARY
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]    Procedure(s) (LRB):  PARTIAL SYNOVECTOMY/DEBRIDEMENT; PLICA  EXCISION KNEE (Right)  CHONDROPLASTY, KNEE ARTHROSCOPIC (Right)  ARTHROSCOPY, KNEE, Lysis of Adhesions (Right)  ARTHROSCOPY, KNEE, WITH PARTIAL MEDIAL MENISCECTOMY (Right)    Anesthesia: General    Description of the findings of the procedure: Right knee arthroscopy    Findings/Key Components: Right knee arthroscopy    Estimated Blood Loss: minimal         Specimens:   Specimen (12h ago, onward)    None          Discharge Note    SUMMARY     Admit Date: 10/24/2019    Discharge Date and Time: No discharge date for patient encounter.    Hospital Course (synopsis of major diagnoses, care, treatment, and services provided during the course of the hospital stay): Patient underwent outpatient knee surgery and was transferred to PACU in stable condition.  In PACU, patient received appropriate post-operative care and discharged home with plans for physical therapy and follow-up with the operative surgeon.    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     BIOFREEZE (MENTHOL) 4 % Gel  Generic drug:  menthol  5 mLs.     clobetasol 0.05 % cream  Commonly known as:  TEMOVATE  APPLY A THIN LAYER TO THE AFFECTED AREA(S) BY  TOPICAL ROUTE 2 TIMES PER DAY     cloNIDine 0.1 MG tablet  Commonly known as:  CATAPRES  Take one tablet at bedtime     * enoxaparin 40 mg/0.4 mL Syrg  Commonly known as:  LOVENOX  Inject 0.4 mLs (40 mg total) into the skin once daily.     * enoxaparin 40 mg/0.4 mL Syrg  Commonly known as:  LOVENOX  Inject 0.4 mLs (40 mg total) into the skin once daily.     esomeprazole 40 MG capsule  Commonly known as:  NEXIUM  TAKE 1 CAPSULE (40 MG TOTAL) BY MOUTH BEFORE DINNER.     fexofenadine 180 MG tablet  Commonly known as:  ALLEGRA  Allergy Relief (fexofenadine) 180 mg tablet   TAKE 1 TABLET BY MOUTH EVERY DAY     hydroCHLOROthiazide 25 MG tablet  Commonly known as:  HYDRODIURIL  Take 25 mg by mouth once daily.     HYDROcodone-acetaminophen  mg per tablet  Commonly known as:  NORCO  Take 1 tablet by mouth every 6 (six) hours as needed (POST-OP DELIVER BEDSIDE AT [facility]).     ketoconazole 2 % cream  Commonly known as:  NIZORAL  APPLY TWICE DAILY TO RASH.     levothyroxine 150 MCG tablet  Commonly known as:  SYNTHROID  Take one tablet daily except on sundays take 2 pills     medroxyPROGESTERone 150 mg/mL injection  Commonly known as:  DEPO-PROVERA  Inject 150 mg into the muscle every 3 (three) months.     metFORMIN 500 MG tablet  Commonly known as:  GLUCOPHAGE  Take 500 mg by mouth 2 (two) times daily with meals.     montelukast 10 mg tablet  Commonly known as:  SINGULAIR  montelukast 10 mg tablet   TAKE 1 TABLET(S) EVERY DAY BY ORAL ROUTE.     nystatin ointment  Commonly known as:  MYCOSTATIN  nystatin 100,000 unit/gram topical ointment     promethazine 25 MG tablet  Commonly known as:  PHENERGAN  Take 1 tablet (25 mg total) by mouth every 6 (six) hours as needed (POST-OP DELIVER BEDSIDE AT [facility]).     sertraline 50 MG tablet  Commonly known as:  ZOLOFT  sertraline 100 mg tablet   TAKE 1 TABLET BY MOUTH EVERY DAY         * This list has 2 medication(s) that are the same as other medications prescribed for you. Read  "the directions carefully, and ask your doctor or other care provider to review them with you.            STOP taking these medications    hydrOXYzine HCl 25 MG tablet  Commonly known as:  ATARAX     MUCINEX 600 mg 12 hr tablet  Generic drug:  guaiFENesin     quinapril 10 MG tablet  Commonly known as:  ACCUPRIL          Discharge Procedure Orders   CRUTCHES FOR HOME USE     Order Specific Question Answer Comments   Type: Axillary    Height: 5' 7" (1.702 m)    Weight: 108.9 kg (240 lb)    Length of need (1-99 months): 99      Diet general     Call MD for:  temperature >100.4     Call MD for:  persistent nausea and vomiting     Call MD for:  severe uncontrolled pain     Call MD for:  difficulty breathing, headache or visual disturbances     Call MD for:  redness, tenderness, or signs of infection (pain, swelling, redness, odor or green/yellow discharge around incision site)     Call MD for:  hives     Call MD for:  persistent dizziness or light-headedness     Keep surgical extremity elevated     Ice to affected area     No driving, operating heavy equipment or signing legal documents while taking pain medication     Remove dressing in 72 hours     Shower on day dressing removed (No bath)     Weight bearing as tolerated       "

## 2019-10-24 NOTE — PLAN OF CARE
Pt resting in room at this time. Pt denies any nausea or pain at this time. Discharge instructions reviewed and pt and spouse verbalized understanding. VS WNL. WOB unlabored. Pt NSR. Will continue to prepare pt for discharge process

## 2019-10-24 NOTE — INTERVAL H&P NOTE
The patient has been examined and the H&P has been reviewed:    I concur with the findings and no changes have occurred since H&P was written.    Anesthesia/Surgery risks, benefits and alternative options discussed and understood by patient/family.          Active Hospital Problems    Diagnosis  POA    Plica syndrome, right knee [M67.51]  Yes      Resolved Hospital Problems   No resolved problems to display.

## 2019-10-24 NOTE — OPERATIVE NOTE ADDENDUM
Certification of Assistant at Surgery       Surgery Date: 10/24/2019     Participating Surgeons:  Surgeon(s) and Role:     * Hazel Wolfe MD - Primary    DANA Lomas PA-C - 1st Assistant    Procedures:  Procedure(s) (LRB):  PARTIAL SYNOVECTOMY/DEBRIDEMENT; PLICA  EXCISION KNEE (Right)  CHONDROPLASTY, KNEE ARTHROSCOPIC (Right)  ARTHROSCOPY, KNEE, Lysis of Adhesions (Right)  ARTHROSCOPY, KNEE, WITH PARTIAL MEDIAL MENISCECTOMY (Right)    Assistant Surgeon's Certification of Necessity:  I understand that section 1842 (b) (6) (d) of the Social Security Act generally prohibits Medicare Part B reasonable charge payment for the services of assistants at surgery in teaching hospitals when qualified residents are available to furnish such services. I certify that the services for which payment is claimed were medically necessary, and that no qualified resident was available to perform the services. I further understand that these services are subject to post-payment review by the Medicare carrier.        Hermelindo Lomas PA-C    10/24/2019  12:48 PM

## 2019-10-24 NOTE — TRANSFER OF CARE
"Anesthesia Transfer of Care Note    Patient: Ailyn Yanes    Procedure(s) Performed: Procedure(s) (LRB):  PARTIAL SYNOVECTOMY/DEBRIDEMENT; PLICA  EXCISION KNEE (Right)  CHONDROPLASTY, KNEE ARTHROSCOPIC (Right)  ARTHROSCOPY, KNEE, Lysis of Adhesions (Right)  ARTHROSCOPY, KNEE, WITH PARTIAL MEDIAL MENISCECTOMY (Right)    Patient location: PACU    Anesthesia Type: general    Transport from OR: Transported from OR on room air with adequate spontaneous ventilation. Transported from OR on 6-10 L/min O2 by face mask with adequate spontaneous ventilation    Post pain: adequate analgesia    Post assessment: no apparent anesthetic complications and tolerated procedure well    Post vital signs: stable    Level of consciousness: awake, alert and oriented    Nausea/Vomiting: no nausea/vomiting    Complications: none    Transfer of care protocol was followed      Last vitals:   Visit Vitals  BP (!) 140/66 (BP Location: Right arm, Patient Position: Lying)   Pulse 62   Temp 36.9 °C (98.5 °F) (Oral)   Resp 18   Ht 5' 7" (1.702 m)   Wt 108.9 kg (240 lb)   LMP  (LMP Unknown)   SpO2 98%   Breastfeeding? No   BMI 37.59 kg/m²     "

## 2019-10-25 VITALS
HEIGHT: 67 IN | SYSTOLIC BLOOD PRESSURE: 120 MMHG | TEMPERATURE: 98 F | OXYGEN SATURATION: 95 % | HEART RATE: 62 BPM | DIASTOLIC BLOOD PRESSURE: 73 MMHG | BODY MASS INDEX: 37.67 KG/M2 | WEIGHT: 240 LBS | RESPIRATION RATE: 19 BRPM

## 2019-10-25 RX ORDER — ALBUTEROL SULFATE 90 UG/1
AEROSOL, METERED RESPIRATORY (INHALATION)
Status: DISCONTINUED | OUTPATIENT
Start: 2019-10-24 | End: 2019-10-25

## 2019-10-25 NOTE — ADDENDUM NOTE
Addendum  created 10/25/19 0737 by Sonia Villa CRNA    Intraprocedure Event edited, Intraprocedure Meds edited, Orders acknowledged in Narrator

## 2019-10-28 ENCOUNTER — TELEPHONE (OUTPATIENT)
Dept: SPORTS MEDICINE | Facility: CLINIC | Age: 49
End: 2019-10-28

## 2019-10-28 ENCOUNTER — PATIENT MESSAGE (OUTPATIENT)
Dept: SPORTS MEDICINE | Facility: CLINIC | Age: 49
End: 2019-10-28

## 2019-10-28 NOTE — TELEPHONE ENCOUNTER
"Patient notes that she noticed the rash this morning. She initially washed yesterday following surgery. She notes that she used a soap that she normally uses. She describes the rash as "chicken skin"     She notes that is does not itch   The rash has not worsened, or improved, it has stayed the same    I asked that she send a picture of the rash through her [facility] for it to be reviewed.   "

## 2019-10-28 NOTE — TELEPHONE ENCOUNTER
----- Message from Sonia Aceves RN sent at 10/28/2019  8:21 AM CDT -----  Contact: pt at [phone]      ----- Message -----  From: Julissa Torres  Sent: 10/28/2019   8:01 AM CDT  To: [facility] Clinical Staff    Yaneth pt-Pt states that she had surgery last Thursday October 24.  Pt has a rash down her  whole right leg.  Pt has had a reaction to something?  Please call and advfise

## 2019-11-08 ENCOUNTER — OFFICE VISIT (OUTPATIENT)
Dept: SPORTS MEDICINE | Facility: CLINIC | Age: 49
End: 2019-11-08
Payer: COMMERCIAL

## 2019-11-08 VITALS
BODY MASS INDEX: 37.67 KG/M2 | DIASTOLIC BLOOD PRESSURE: 68 MMHG | HEIGHT: 67 IN | WEIGHT: 240 LBS | SYSTOLIC BLOOD PRESSURE: 119 MMHG | HEART RATE: 69 BPM

## 2019-11-08 DIAGNOSIS — Z98.890 S/P ARTHROSCOPIC SURGERY OF RIGHT KNEE: Primary | ICD-10-CM

## 2019-11-08 PROCEDURE — 99999 PR PBB SHADOW E&M-EST. PATIENT-LVL IV: ICD-10-PCS | Mod: PBBFAC,,, | Performed by: PHYSICIAN ASSISTANT

## 2019-11-08 PROCEDURE — 99999 PR PBB SHADOW E&M-EST. PATIENT-LVL IV: CPT | Mod: PBBFAC,,, | Performed by: PHYSICIAN ASSISTANT

## 2019-11-08 PROCEDURE — 99499 UNLISTED E&M SERVICE: CPT | Mod: S$GLB,,, | Performed by: PHYSICIAN ASSISTANT

## 2019-11-08 PROCEDURE — 99499 NO LOS: ICD-10-PCS | Mod: S$GLB,,, | Performed by: PHYSICIAN ASSISTANT

## 2019-11-08 NOTE — PROGRESS NOTES
HISTORY OF PRESENT ILLNESS:   Pt is here today for first post-operative followup of knee arthroscopy.  she is doing well.  We have reviewed her findings and discussed plan of care and future treatment options.      She is doing well with no issues.   Reports that knee feels improved compared to prior to surgery.      DATE OF PROCEDURE: 10/24/2019     SURGEON:  Hazel Wolfe M.D     PROCEDURE PERFORMED:   right  1. knee arthroscopic chondroplasty (CPT 98822)  2. knee arthroscopic medial (CPT 14249) meniscectomy   3. knee arthroscopic partial synovectomy/debridement (CPT 95629).   4. knee arthroscopic plica excision(CPT 76778).    5. Knee arthroscopic lysis of adhesions (CPT 99292)    In the patellofemoral compartment, there was chondral damage to:  Patella 20 x 20 mm grade 3  Chondroplasty was performed using arthroscopic shaver..     There was chondral damage to:  Lateral femoral condyle 15 x 10 mm grade 2  Lateral tibial plateau 15 x 15 mm grade 2  Chondroplasty was performed using arthroscopic shaver.                                                          PHYSICAL EXAMINATION:     Incision sites healed well  No evidence of any erythema, infection or induration  Range of motion 0-130 degrees  Minimal effusion  2+ DP pulse  No swelling, no calf tenderness  - Sean's sign  Negative medial joint line tendernes  Moderate quad atrophy    Mild knee swelling around patellofemoral area.                                                                                ASSESSMENT:                                                                                                                                               1. Status post above, doing well.                                                                                                                               PLAN:                                                                                                                                                      1. Continue with PT.  I feel that her swelling may be more in the soft tissues vs her knee joint due to her good ROM. She also reports that knee was larger appearing chronically prior to surgery. She has good quad contraction today.   Increase icing the knee to improve swelling.   2. Emphasized quad function.  3. I have discussed return to activity in detail.  4.Patient will see us back at 4 week post-op chidi.   And may aspirate the knee if it is still swollen.                                  5. All questions were answered and patient should contact us if she  has any questions or concerns in the interim.

## 2019-11-11 ENCOUNTER — LAB VISIT (OUTPATIENT)
Dept: LAB | Facility: HOSPITAL | Age: 49
End: 2019-11-11
Attending: INTERNAL MEDICINE
Payer: COMMERCIAL

## 2019-11-11 DIAGNOSIS — E03.9 HYPOTHYROIDISM, UNSPECIFIED TYPE: ICD-10-CM

## 2019-11-11 LAB
T4 FREE SERPL-MCNC: 1.19 NG/DL (ref 0.71–1.51)
THYROPEROXIDASE IGG SERPL-ACNC: 408.7 IU/ML
TSH SERPL DL<=0.005 MIU/L-ACNC: 2.58 UIU/ML (ref 0.4–4)

## 2019-11-11 PROCEDURE — 86376 MICROSOMAL ANTIBODY EACH: CPT

## 2019-11-11 PROCEDURE — 84443 ASSAY THYROID STIM HORMONE: CPT

## 2019-11-11 PROCEDURE — 36415 COLL VENOUS BLD VENIPUNCTURE: CPT

## 2019-11-11 PROCEDURE — 84439 ASSAY OF FREE THYROXINE: CPT

## 2019-11-13 ENCOUNTER — PATIENT MESSAGE (OUTPATIENT)
Dept: ENDOCRINOLOGY | Facility: CLINIC | Age: 49
End: 2019-11-13

## 2019-11-21 ENCOUNTER — TELEPHONE (OUTPATIENT)
Dept: SPORTS MEDICINE | Facility: CLINIC | Age: 49
End: 2019-11-21

## 2019-11-25 ENCOUNTER — OFFICE VISIT (OUTPATIENT)
Dept: SPORTS MEDICINE | Facility: CLINIC | Age: 49
End: 2019-11-25
Payer: COMMERCIAL

## 2019-11-25 VITALS
SYSTOLIC BLOOD PRESSURE: 137 MMHG | WEIGHT: 240 LBS | DIASTOLIC BLOOD PRESSURE: 87 MMHG | HEART RATE: 63 BPM | BODY MASS INDEX: 37.67 KG/M2 | HEIGHT: 67 IN

## 2019-11-25 DIAGNOSIS — Z98.890 S/P ARTHROSCOPIC SURGERY OF RIGHT KNEE: Primary | ICD-10-CM

## 2019-11-25 PROCEDURE — 99999 PR PBB SHADOW E&M-EST. PATIENT-LVL IV: CPT | Mod: PBBFAC,,, | Performed by: PHYSICIAN ASSISTANT

## 2019-11-25 PROCEDURE — 99999 PR PBB SHADOW E&M-EST. PATIENT-LVL IV: ICD-10-PCS | Mod: PBBFAC,,, | Performed by: PHYSICIAN ASSISTANT

## 2019-11-25 PROCEDURE — 99024 POSTOP FOLLOW-UP VISIT: CPT | Mod: S$GLB,,, | Performed by: PHYSICIAN ASSISTANT

## 2019-11-25 PROCEDURE — 99024 PR POST-OP FOLLOW-UP VISIT: ICD-10-PCS | Mod: S$GLB,,, | Performed by: PHYSICIAN ASSISTANT

## 2019-11-25 NOTE — PROGRESS NOTES
HISTORY OF PRESENT ILLNESS:   Pt is here today for post-operative followup of knee arthroscopy.  she is doing well.  We have reviewed her findings and discussed plan of care and future treatment options.      She is doing well with no issues.    Reports that knee feels improved compared to prior to surgery. Pain prior to surgery is resolved.   Doing PT at [facility] near her home.   Still reports of pain of her bilateral portal sites that only occurs with palpation.      DATE OF PROCEDURE: 10/24/2019     SURGEON:  Hazel Wolfe M.D     PROCEDURE PERFORMED:   right  1. knee arthroscopic chondroplasty (CPT 61574)  2. knee arthroscopic medial (CPT 93697) meniscectomy   3. knee arthroscopic partial synovectomy/debridement (CPT 97161).   4. knee arthroscopic plica excision(CPT 03465).    5. Knee arthroscopic lysis of adhesions (CPT 14664)    In the patellofemoral compartment, there was chondral damage to:  Patella 20 x 20 mm grade 3  Chondroplasty was performed using arthroscopic shaver..     There was chondral damage to:  Lateral femoral condyle 15 x 10 mm grade 2  Lateral tibial plateau 15 x 15 mm grade 2  Chondroplasty was performed using arthroscopic shaver.                                                          PHYSICAL EXAMINATION:     Incision sites healed well  No evidence of any erythema, infection or induration  Range of motion -2-135 degrees of bilateral knees. ROM back to baseline.   no effusion  2+ DP pulse  No swelling, no calf tenderness  - Sean's sign  Negative medial joint line tendernes  Mild quad atrophy                                                                                 ASSESSMENT:                                                                                                                                               1. Status post above, doing well.                                                                                                                               PLAN:                                                                                                                                                      1. Continue with PT for another 4 weeks and then at home for another 3-4 weeks.   She is pes planus bilaterally and Toni at [facility] has got her new showes with medial wedges to help foot over pronation. This has helped her.   2. Emphasized quad function.  3. I have discussed return to activity in detail.  4.Patient will see us back as needed going forward.   -also discussed her continuing to try weight loss efforts which in her is complicated by hypothyroidism.                                  5. All questions were answered and patient should contact us if she  has any questions or concerns in the interim.

## 2019-11-27 ENCOUNTER — PATIENT MESSAGE (OUTPATIENT)
Dept: SPORTS MEDICINE | Facility: CLINIC | Age: 49
End: 2019-11-27

## 2019-12-02 ENCOUNTER — TELEPHONE (OUTPATIENT)
Dept: SPORTS MEDICINE | Facility: CLINIC | Age: 49
End: 2019-12-02

## 2019-12-02 DIAGNOSIS — M67.51 PLICA SYNDROME, RIGHT KNEE: ICD-10-CM

## 2019-12-02 DIAGNOSIS — S83.241D TEAR OF MEDIAL MENISCUS OF RIGHT KNEE, UNSPECIFIED TEAR TYPE, UNSPECIFIED WHETHER OLD OR CURRENT TEAR, SUBSEQUENT ENCOUNTER: ICD-10-CM

## 2019-12-02 DIAGNOSIS — Z98.890 S/P ARTHROSCOPIC SURGERY OF RIGHT KNEE: Primary | ICD-10-CM

## 2019-12-02 DIAGNOSIS — M94.261 CHONDROMALACIA OF RIGHT KNEE: ICD-10-CM

## 2020-02-05 PROBLEM — K81.0 ACUTE CHOLECYSTITIS: Status: ACTIVE | Noted: 2020-02-05

## 2020-02-06 PROBLEM — R74.8 ELEVATED LIVER ENZYMES: Status: ACTIVE | Noted: 2020-02-06

## 2020-02-21 DIAGNOSIS — N95.1 MENOPAUSAL SYMPTOMS: ICD-10-CM

## 2020-02-21 RX ORDER — CLONIDINE HYDROCHLORIDE 0.1 MG/1
TABLET ORAL
Qty: 90 TABLET | Refills: 2 | Status: SHIPPED | OUTPATIENT
Start: 2020-02-21 | End: 2020-11-07

## 2020-02-27 RX ORDER — LEVOTHYROXINE SODIUM 150 UG/1
TABLET ORAL
Qty: 105 TABLET | Refills: 2 | Status: SHIPPED | OUTPATIENT
Start: 2020-02-27 | End: 2020-11-07

## 2020-11-11 ENCOUNTER — TELEPHONE (OUTPATIENT)
Dept: ENDOCRINOLOGY | Facility: CLINIC | Age: 50
End: 2020-11-11

## 2020-11-11 DIAGNOSIS — E03.9 HYPOTHYROIDISM, UNSPECIFIED TYPE: Primary | ICD-10-CM

## 2020-11-11 DIAGNOSIS — E11.9 CONTROLLED TYPE 2 DIABETES MELLITUS WITHOUT COMPLICATION, WITHOUT LONG-TERM CURRENT USE OF INSULIN: ICD-10-CM

## 2020-11-11 NOTE — TELEPHONE ENCOUNTER
[facility] in [place] for labs non fasting dosent matter what time or day, if fasting earlier the better.

## 2020-11-19 ENCOUNTER — LAB VISIT (OUTPATIENT)
Dept: LAB | Facility: HOSPITAL | Age: 50
End: 2020-11-19
Attending: INTERNAL MEDICINE
Payer: COMMERCIAL

## 2020-11-19 DIAGNOSIS — E03.9 HYPOTHYROIDISM, UNSPECIFIED TYPE: ICD-10-CM

## 2020-11-19 DIAGNOSIS — E11.9 CONTROLLED TYPE 2 DIABETES MELLITUS WITHOUT COMPLICATION, WITHOUT LONG-TERM CURRENT USE OF INSULIN: ICD-10-CM

## 2020-11-19 LAB
ALBUMIN SERPL BCP-MCNC: 4.2 G/DL (ref 3.5–5.2)
ALP SERPL-CCNC: 99 U/L (ref 55–135)
ALT SERPL W/O P-5'-P-CCNC: 22 U/L (ref 10–44)
ANION GAP SERPL CALC-SCNC: 8 MMOL/L (ref 8–16)
AST SERPL-CCNC: 15 U/L (ref 10–40)
BILIRUB SERPL-MCNC: 0.3 MG/DL (ref 0.1–1)
BUN SERPL-MCNC: 13 MG/DL (ref 6–20)
CALCIUM SERPL-MCNC: 9.8 MG/DL (ref 8.7–10.5)
CHLORIDE SERPL-SCNC: 104 MMOL/L (ref 95–110)
CO2 SERPL-SCNC: 28 MMOL/L (ref 23–29)
CREAT SERPL-MCNC: 0.8 MG/DL (ref 0.5–1.4)
EST. GFR  (AFRICAN AMERICAN): >60 ML/MIN/1.73 M^2
EST. GFR  (NON AFRICAN AMERICAN): >60 ML/MIN/1.73 M^2
GLUCOSE SERPL-MCNC: 121 MG/DL (ref 70–110)
POTASSIUM SERPL-SCNC: 4.8 MMOL/L (ref 3.5–5.1)
PROT SERPL-MCNC: 7.8 G/DL (ref 6–8.4)
SODIUM SERPL-SCNC: 140 MMOL/L (ref 136–145)
TSH SERPL DL<=0.005 MIU/L-ACNC: 2.3 UIU/ML (ref 0.4–4)

## 2020-11-19 PROCEDURE — 80053 COMPREHEN METABOLIC PANEL: CPT

## 2020-11-19 PROCEDURE — 84443 ASSAY THYROID STIM HORMONE: CPT

## 2020-11-19 PROCEDURE — 36415 COLL VENOUS BLD VENIPUNCTURE: CPT

## 2021-02-01 ENCOUNTER — OFFICE VISIT (OUTPATIENT)
Dept: OTOLARYNGOLOGY | Facility: CLINIC | Age: 51
End: 2021-02-01
Payer: COMMERCIAL

## 2021-02-01 ENCOUNTER — CLINICAL SUPPORT (OUTPATIENT)
Dept: AUDIOLOGY | Facility: CLINIC | Age: 51
End: 2021-02-01
Payer: COMMERCIAL

## 2021-02-01 VITALS — SYSTOLIC BLOOD PRESSURE: 153 MMHG | HEART RATE: 66 BPM | DIASTOLIC BLOOD PRESSURE: 92 MMHG

## 2021-02-01 DIAGNOSIS — H69.93 DYSFUNCTION OF BOTH EUSTACHIAN TUBES: ICD-10-CM

## 2021-02-01 DIAGNOSIS — H93.13 TINNITUS OF BOTH EARS: Primary | ICD-10-CM

## 2021-02-01 DIAGNOSIS — H69.91 DYSFUNCTION OF RIGHT EUSTACHIAN TUBE: Primary | ICD-10-CM

## 2021-02-01 DIAGNOSIS — H93.8X1 SENSATION OF FULLNESS IN RIGHT EAR: ICD-10-CM

## 2021-02-01 DIAGNOSIS — H93.13 TINNITUS OF BOTH EARS: ICD-10-CM

## 2021-02-01 PROCEDURE — 3077F SYST BP >= 140 MM HG: CPT | Mod: CPTII,S$GLB,, | Performed by: NURSE PRACTITIONER

## 2021-02-01 PROCEDURE — 92557 COMPREHENSIVE HEARING TEST: CPT | Mod: S$GLB,,, | Performed by: AUDIOLOGIST

## 2021-02-01 PROCEDURE — 99204 OFFICE O/P NEW MOD 45 MIN: CPT | Mod: S$GLB,,, | Performed by: NURSE PRACTITIONER

## 2021-02-01 PROCEDURE — 1125F PR PAIN SEVERITY QUANTIFIED, PAIN PRESENT: ICD-10-PCS | Mod: S$GLB,,, | Performed by: NURSE PRACTITIONER

## 2021-02-01 PROCEDURE — 92567 TYMPANOMETRY: CPT | Mod: S$GLB,,, | Performed by: AUDIOLOGIST

## 2021-02-01 PROCEDURE — 92557 PR COMPREHENSIVE HEARING TEST: ICD-10-PCS | Mod: S$GLB,,, | Performed by: AUDIOLOGIST

## 2021-02-01 PROCEDURE — 1125F AMNT PAIN NOTED PAIN PRSNT: CPT | Mod: S$GLB,,, | Performed by: NURSE PRACTITIONER

## 2021-02-01 PROCEDURE — 3080F PR MOST RECENT DIASTOLIC BLOOD PRESSURE >= 90 MM HG: ICD-10-PCS | Mod: CPTII,S$GLB,, | Performed by: NURSE PRACTITIONER

## 2021-02-01 PROCEDURE — 99999 PR PBB SHADOW E&M-EST. PATIENT-LVL III: ICD-10-PCS | Mod: PBBFAC,,, | Performed by: NURSE PRACTITIONER

## 2021-02-01 PROCEDURE — 99204 PR OFFICE/OUTPT VISIT, NEW, LEVL IV, 45-59 MIN: ICD-10-PCS | Mod: S$GLB,,, | Performed by: NURSE PRACTITIONER

## 2021-02-01 PROCEDURE — 92567 PR TYMPA2METRY: ICD-10-PCS | Mod: S$GLB,,, | Performed by: AUDIOLOGIST

## 2021-02-01 PROCEDURE — 3080F DIAST BP >= 90 MM HG: CPT | Mod: CPTII,S$GLB,, | Performed by: NURSE PRACTITIONER

## 2021-02-01 PROCEDURE — 99999 PR PBB SHADOW E&M-EST. PATIENT-LVL III: CPT | Mod: PBBFAC,,, | Performed by: NURSE PRACTITIONER

## 2021-02-01 PROCEDURE — 3077F PR MOST RECENT SYSTOLIC BLOOD PRESSURE >= 140 MM HG: ICD-10-PCS | Mod: CPTII,S$GLB,, | Performed by: NURSE PRACTITIONER

## 2021-02-01 RX ORDER — FLUTICASONE PROPIONATE 50 MCG
2 SPRAY, SUSPENSION (ML) NASAL DAILY
Qty: 11.1 ML | Refills: 2 | Status: SHIPPED | OUTPATIENT
Start: 2021-02-01 | End: 2021-05-02

## 2021-03-03 ENCOUNTER — OFFICE VISIT (OUTPATIENT)
Dept: OTOLARYNGOLOGY | Facility: CLINIC | Age: 51
End: 2021-03-03
Payer: COMMERCIAL

## 2021-03-03 VITALS
DIASTOLIC BLOOD PRESSURE: 83 MMHG | BODY MASS INDEX: 38.37 KG/M2 | SYSTOLIC BLOOD PRESSURE: 144 MMHG | HEART RATE: 58 BPM | WEIGHT: 245 LBS

## 2021-03-03 DIAGNOSIS — H69.91 DYSFUNCTION OF RIGHT EUSTACHIAN TUBE: Primary | ICD-10-CM

## 2021-03-03 DIAGNOSIS — J30.9 ALLERGIC RHINITIS, UNSPECIFIED SEASONALITY, UNSPECIFIED TRIGGER: ICD-10-CM

## 2021-03-03 DIAGNOSIS — J34.89 SINUS PRESSURE: ICD-10-CM

## 2021-03-03 PROCEDURE — 3079F PR MOST RECENT DIASTOLIC BLOOD PRESSURE 80-89 MM HG: ICD-10-PCS | Mod: CPTII,S$GLB,, | Performed by: NURSE PRACTITIONER

## 2021-03-03 PROCEDURE — 99213 PR OFFICE/OUTPT VISIT, EST, LEVL III, 20-29 MIN: ICD-10-PCS | Mod: S$GLB,,, | Performed by: NURSE PRACTITIONER

## 2021-03-03 PROCEDURE — 1126F AMNT PAIN NOTED NONE PRSNT: CPT | Mod: S$GLB,,, | Performed by: NURSE PRACTITIONER

## 2021-03-03 PROCEDURE — 3008F BODY MASS INDEX DOCD: CPT | Mod: CPTII,S$GLB,, | Performed by: NURSE PRACTITIONER

## 2021-03-03 PROCEDURE — 3008F PR BODY MASS INDEX (BMI) DOCUMENTED: ICD-10-PCS | Mod: CPTII,S$GLB,, | Performed by: NURSE PRACTITIONER

## 2021-03-03 PROCEDURE — 1126F PR PAIN SEVERITY QUANTIFIED, NO PAIN PRESENT: ICD-10-PCS | Mod: S$GLB,,, | Performed by: NURSE PRACTITIONER

## 2021-03-03 PROCEDURE — 3079F DIAST BP 80-89 MM HG: CPT | Mod: CPTII,S$GLB,, | Performed by: NURSE PRACTITIONER

## 2021-03-03 PROCEDURE — 99999 PR PBB SHADOW E&M-EST. PATIENT-LVL III: ICD-10-PCS | Mod: PBBFAC,,, | Performed by: NURSE PRACTITIONER

## 2021-03-03 PROCEDURE — 3077F PR MOST RECENT SYSTOLIC BLOOD PRESSURE >= 140 MM HG: ICD-10-PCS | Mod: CPTII,S$GLB,, | Performed by: NURSE PRACTITIONER

## 2021-03-03 PROCEDURE — 99213 OFFICE O/P EST LOW 20 MIN: CPT | Mod: S$GLB,,, | Performed by: NURSE PRACTITIONER

## 2021-03-03 PROCEDURE — 3077F SYST BP >= 140 MM HG: CPT | Mod: CPTII,S$GLB,, | Performed by: NURSE PRACTITIONER

## 2021-03-03 PROCEDURE — 99999 PR PBB SHADOW E&M-EST. PATIENT-LVL III: CPT | Mod: PBBFAC,,, | Performed by: NURSE PRACTITIONER

## 2021-03-03 RX ORDER — BUDESONIDE 0.5 MG/2ML
0.5 INHALANT ORAL DAILY
Qty: 180 ML | Refills: 1 | Status: SHIPPED | OUTPATIENT
Start: 2021-03-03 | End: 2021-08-17

## 2021-03-18 ENCOUNTER — PATIENT MESSAGE (OUTPATIENT)
Dept: OTOLARYNGOLOGY | Facility: CLINIC | Age: 51
End: 2021-03-18

## 2021-03-19 ENCOUNTER — OFFICE VISIT (OUTPATIENT)
Dept: OTOLARYNGOLOGY | Facility: CLINIC | Age: 51
End: 2021-03-19
Payer: COMMERCIAL

## 2021-03-19 VITALS
BODY MASS INDEX: 37.42 KG/M2 | SYSTOLIC BLOOD PRESSURE: 140 MMHG | HEIGHT: 67 IN | DIASTOLIC BLOOD PRESSURE: 86 MMHG | TEMPERATURE: 98 F | WEIGHT: 238.44 LBS

## 2021-03-19 DIAGNOSIS — J30.9 ALLERGIC RHINITIS, UNSPECIFIED SEASONALITY, UNSPECIFIED TRIGGER: ICD-10-CM

## 2021-03-19 DIAGNOSIS — H69.91 DYSFUNCTION OF RIGHT EUSTACHIAN TUBE: Primary | ICD-10-CM

## 2021-03-19 PROCEDURE — 3077F PR MOST RECENT SYSTOLIC BLOOD PRESSURE >= 140 MM HG: ICD-10-PCS | Mod: CPTII,S$GLB,, | Performed by: NURSE PRACTITIONER

## 2021-03-19 PROCEDURE — 3008F PR BODY MASS INDEX (BMI) DOCUMENTED: ICD-10-PCS | Mod: CPTII,S$GLB,, | Performed by: NURSE PRACTITIONER

## 2021-03-19 PROCEDURE — 99213 OFFICE O/P EST LOW 20 MIN: CPT | Mod: S$GLB,,, | Performed by: NURSE PRACTITIONER

## 2021-03-19 PROCEDURE — 3079F PR MOST RECENT DIASTOLIC BLOOD PRESSURE 80-89 MM HG: ICD-10-PCS | Mod: CPTII,S$GLB,, | Performed by: NURSE PRACTITIONER

## 2021-03-19 PROCEDURE — 3077F SYST BP >= 140 MM HG: CPT | Mod: CPTII,S$GLB,, | Performed by: NURSE PRACTITIONER

## 2021-03-19 PROCEDURE — 3008F BODY MASS INDEX DOCD: CPT | Mod: CPTII,S$GLB,, | Performed by: NURSE PRACTITIONER

## 2021-03-19 PROCEDURE — 1125F PR PAIN SEVERITY QUANTIFIED, PAIN PRESENT: ICD-10-PCS | Mod: S$GLB,,, | Performed by: NURSE PRACTITIONER

## 2021-03-19 PROCEDURE — 1125F AMNT PAIN NOTED PAIN PRSNT: CPT | Mod: S$GLB,,, | Performed by: NURSE PRACTITIONER

## 2021-03-19 PROCEDURE — 3079F DIAST BP 80-89 MM HG: CPT | Mod: CPTII,S$GLB,, | Performed by: NURSE PRACTITIONER

## 2021-03-19 PROCEDURE — 99213 PR OFFICE/OUTPT VISIT, EST, LEVL III, 20-29 MIN: ICD-10-PCS | Mod: S$GLB,,, | Performed by: NURSE PRACTITIONER

## 2021-03-19 RX ORDER — BECLOMETHASONE DIPROPIONATE 80 UG/1
80 AEROSOL, METERED NASAL DAILY
Qty: 10.6 G | Refills: 2 | Status: SHIPPED | OUTPATIENT
Start: 2021-03-19 | End: 2021-06-17

## 2021-05-06 ENCOUNTER — PATIENT MESSAGE (OUTPATIENT)
Dept: RESEARCH | Facility: HOSPITAL | Age: 51
End: 2021-05-06

## 2021-05-10 ENCOUNTER — PATIENT MESSAGE (OUTPATIENT)
Dept: RESEARCH | Facility: HOSPITAL | Age: 51
End: 2021-05-10

## 2021-07-18 ENCOUNTER — PATIENT MESSAGE (OUTPATIENT)
Dept: OTOLARYNGOLOGY | Facility: CLINIC | Age: 51
End: 2021-07-18

## 2021-08-12 DIAGNOSIS — Z01.818 PRE-OP TESTING: ICD-10-CM

## 2021-08-14 ENCOUNTER — HOSPITAL ENCOUNTER (OUTPATIENT)
Dept: PREADMISSION TESTING | Facility: HOSPITAL | Age: 51
Discharge: HOME | End: 2021-08-14
Attending: OTOLARYNGOLOGY
Payer: COMMERCIAL

## 2021-08-14 DIAGNOSIS — Z01.818 PRE-OP TESTING: ICD-10-CM

## 2021-08-14 PROCEDURE — U0003 INFECTIOUS AGENT DETECTION BY NUCLEIC ACID (DNA OR RNA); SEVERE ACUTE RESPIRATORY SYNDROME CORONAVIRUS 2 (SARS-COV-2) (CORONAVIRUS DISEASE [COVID-19]), AMPLIFIED PROBE TECHNIQUE, MAKING USE OF HIGH THROUGHPUT TECHNOLOGIES AS DESCRIBED BY CMS-2020-01-R: HCPCS | Performed by: OTOLARYNGOLOGY

## 2021-08-14 PROCEDURE — U0005 INFEC AGEN DETEC AMPLI PROBE: HCPCS | Performed by: OTOLARYNGOLOGY

## 2021-08-15 LAB
SARS-COV-2 RNA RESP QL NAA+PROBE: NOT DETECTED
SARS-COV-2- CYCLE NUMBER: -1

## 2021-08-17 ENCOUNTER — OFFICE VISIT (OUTPATIENT)
Dept: OTOLARYNGOLOGY | Facility: CLINIC | Age: 51
End: 2021-08-17
Payer: COMMERCIAL

## 2021-08-17 ENCOUNTER — OFFICE VISIT (OUTPATIENT)
Dept: ORTHOPEDICS | Facility: CLINIC | Age: 51
End: 2021-08-17
Payer: COMMERCIAL

## 2021-08-17 ENCOUNTER — HOSPITAL ENCOUNTER (OUTPATIENT)
Dept: RADIOLOGY | Facility: HOSPITAL | Age: 51
Discharge: HOME | End: 2021-08-17
Attending: PHYSICIAN ASSISTANT
Payer: COMMERCIAL

## 2021-08-17 VITALS — WEIGHT: 240 LBS | BODY MASS INDEX: 37.67 KG/M2 | HEIGHT: 67 IN

## 2021-08-17 VITALS — WEIGHT: 238.31 LBS | HEIGHT: 67 IN | BODY MASS INDEX: 37.4 KG/M2

## 2021-08-17 DIAGNOSIS — H69.91 DYSFUNCTION OF RIGHT EUSTACHIAN TUBE: ICD-10-CM

## 2021-08-17 DIAGNOSIS — J30.9 ALLERGIC RHINITIS, UNSPECIFIED SEASONALITY, UNSPECIFIED TRIGGER: Primary | ICD-10-CM

## 2021-08-17 DIAGNOSIS — M25.512 LEFT SHOULDER PAIN, UNSPECIFIED CHRONICITY: Primary | ICD-10-CM

## 2021-08-17 DIAGNOSIS — M25.512 LEFT SHOULDER PAIN, UNSPECIFIED CHRONICITY: ICD-10-CM

## 2021-08-17 PROCEDURE — 1160F PR REVIEW ALL MEDS BY PRESCRIBER/CLIN PHARMACIST DOCUMENTED: ICD-10-PCS | Mod: CPTII,S$GLB,, | Performed by: OTOLARYNGOLOGY

## 2021-08-17 PROCEDURE — 99214 OFFICE O/P EST MOD 30 MIN: CPT | Mod: 25,S$GLB,, | Performed by: OTOLARYNGOLOGY

## 2021-08-17 PROCEDURE — 1160F RVW MEDS BY RX/DR IN RCRD: CPT | Mod: CPTII,S$GLB,, | Performed by: OTOLARYNGOLOGY

## 2021-08-17 PROCEDURE — 1125F AMNT PAIN NOTED PAIN PRSNT: CPT | Mod: CPTII,S$GLB,, | Performed by: OTOLARYNGOLOGY

## 2021-08-17 PROCEDURE — 99999 PR PBB SHADOW E&M-EST. PATIENT-LVL III: CPT | Mod: PBBFAC,,, | Performed by: PHYSICIAN ASSISTANT

## 2021-08-17 PROCEDURE — 99213 OFFICE O/P EST LOW 20 MIN: CPT | Mod: 25,S$GLB,, | Performed by: PHYSICIAN ASSISTANT

## 2021-08-17 PROCEDURE — 3008F PR BODY MASS INDEX (BMI) DOCUMENTED: ICD-10-PCS | Mod: CPTII,S$GLB,, | Performed by: PHYSICIAN ASSISTANT

## 2021-08-17 PROCEDURE — 1160F RVW MEDS BY RX/DR IN RCRD: CPT | Mod: CPTII,S$GLB,, | Performed by: PHYSICIAN ASSISTANT

## 2021-08-17 PROCEDURE — 1159F MED LIST DOCD IN RCRD: CPT | Mod: CPTII,S$GLB,, | Performed by: PHYSICIAN ASSISTANT

## 2021-08-17 PROCEDURE — 1159F PR MEDICATION LIST DOCUMENTED IN MEDICAL RECORD: ICD-10-PCS | Mod: CPTII,S$GLB,, | Performed by: OTOLARYNGOLOGY

## 2021-08-17 PROCEDURE — 1159F MED LIST DOCD IN RCRD: CPT | Mod: CPTII,S$GLB,, | Performed by: OTOLARYNGOLOGY

## 2021-08-17 PROCEDURE — 73030 X-RAY EXAM OF SHOULDER: CPT | Mod: TC,LT

## 2021-08-17 PROCEDURE — 99999 PR PBB SHADOW E&M-EST. PATIENT-LVL III: ICD-10-PCS | Mod: PBBFAC,,, | Performed by: OTOLARYNGOLOGY

## 2021-08-17 PROCEDURE — 31231 NASAL ENDOSCOPY DX: CPT | Mod: S$GLB,,, | Performed by: OTOLARYNGOLOGY

## 2021-08-17 PROCEDURE — 73030 XR SHOULDER COMPLETE 2 OR MORE VIEWS LEFT: ICD-10-PCS | Mod: 26,LT,, | Performed by: RADIOLOGY

## 2021-08-17 PROCEDURE — 1159F PR MEDICATION LIST DOCUMENTED IN MEDICAL RECORD: ICD-10-PCS | Mod: CPTII,S$GLB,, | Performed by: PHYSICIAN ASSISTANT

## 2021-08-17 PROCEDURE — 73030 X-RAY EXAM OF SHOULDER: CPT | Mod: 26,LT,, | Performed by: RADIOLOGY

## 2021-08-17 PROCEDURE — 20610 PR DRAIN/INJECT LARGE JOINT/BURSA: ICD-10-PCS | Mod: LT,S$GLB,, | Performed by: PHYSICIAN ASSISTANT

## 2021-08-17 PROCEDURE — 3008F BODY MASS INDEX DOCD: CPT | Mod: CPTII,S$GLB,, | Performed by: PHYSICIAN ASSISTANT

## 2021-08-17 PROCEDURE — 3008F BODY MASS INDEX DOCD: CPT | Mod: CPTII,S$GLB,, | Performed by: OTOLARYNGOLOGY

## 2021-08-17 PROCEDURE — 31231 NASAL/SINUS ENDOSCOPY: ICD-10-PCS | Mod: S$GLB,,, | Performed by: OTOLARYNGOLOGY

## 2021-08-17 PROCEDURE — 99999 PR PBB SHADOW E&M-EST. PATIENT-LVL III: ICD-10-PCS | Mod: PBBFAC,,, | Performed by: PHYSICIAN ASSISTANT

## 2021-08-17 PROCEDURE — 1125F AMNT PAIN NOTED PAIN PRSNT: CPT | Mod: CPTII,S$GLB,, | Performed by: PHYSICIAN ASSISTANT

## 2021-08-17 PROCEDURE — 1125F PR PAIN SEVERITY QUANTIFIED, PAIN PRESENT: ICD-10-PCS | Mod: CPTII,S$GLB,, | Performed by: PHYSICIAN ASSISTANT

## 2021-08-17 PROCEDURE — 1125F PR PAIN SEVERITY QUANTIFIED, PAIN PRESENT: ICD-10-PCS | Mod: CPTII,S$GLB,, | Performed by: OTOLARYNGOLOGY

## 2021-08-17 PROCEDURE — 99999 PR PBB SHADOW E&M-EST. PATIENT-LVL III: CPT | Mod: PBBFAC,,, | Performed by: OTOLARYNGOLOGY

## 2021-08-17 PROCEDURE — 20610 DRAIN/INJ JOINT/BURSA W/O US: CPT | Mod: LT,S$GLB,, | Performed by: PHYSICIAN ASSISTANT

## 2021-08-17 PROCEDURE — 99214 PR OFFICE/OUTPT VISIT, EST, LEVL IV, 30-39 MIN: ICD-10-PCS | Mod: 25,S$GLB,, | Performed by: OTOLARYNGOLOGY

## 2021-08-17 PROCEDURE — 3008F PR BODY MASS INDEX (BMI) DOCUMENTED: ICD-10-PCS | Mod: CPTII,S$GLB,, | Performed by: OTOLARYNGOLOGY

## 2021-08-17 PROCEDURE — 1160F PR REVIEW ALL MEDS BY PRESCRIBER/CLIN PHARMACIST DOCUMENTED: ICD-10-PCS | Mod: CPTII,S$GLB,, | Performed by: PHYSICIAN ASSISTANT

## 2021-08-17 PROCEDURE — 99213 PR OFFICE/OUTPT VISIT, EST, LEVL III, 20-29 MIN: ICD-10-PCS | Mod: 25,S$GLB,, | Performed by: PHYSICIAN ASSISTANT

## 2021-08-17 RX ORDER — CYCLOBENZAPRINE HCL 5 MG
TABLET ORAL
COMMUNITY
Start: 2021-07-22 | End: 2022-08-09

## 2021-08-17 RX ORDER — HYDROCHLOROTHIAZIDE 25 MG/1
25 TABLET ORAL DAILY
COMMUNITY
Start: 2021-07-14

## 2021-08-17 RX ORDER — DEXLANSOPRAZOLE 60 MG/1
1 CAPSULE, DELAYED RELEASE ORAL DAILY
COMMUNITY
Start: 2021-05-31

## 2021-08-17 RX ORDER — BETAMETHASONE DIPROPIONATE 0.5 MG/G
CREAM TOPICAL
COMMUNITY
End: 2022-05-11

## 2021-08-17 RX ADMIN — TRIAMCINOLONE ACETONIDE 80 MG: 40 INJECTION, SUSPENSION INTRA-ARTICULAR; INTRAMUSCULAR at 01:08

## 2021-08-18 ENCOUNTER — PATIENT MESSAGE (OUTPATIENT)
Dept: OTOLARYNGOLOGY | Facility: CLINIC | Age: 51
End: 2021-08-18

## 2021-08-18 DIAGNOSIS — J30.9 ALLERGIC RHINITIS, UNSPECIFIED SEASONALITY, UNSPECIFIED TRIGGER: Primary | ICD-10-CM

## 2021-08-18 RX ORDER — TRIAMCINOLONE ACETONIDE 40 MG/ML
80 INJECTION, SUSPENSION INTRA-ARTICULAR; INTRAMUSCULAR
Status: COMPLETED | OUTPATIENT
Start: 2021-08-17 | End: 2021-08-17

## 2021-08-19 RX ORDER — AZELASTINE 1 MG/ML
1 SPRAY, METERED NASAL 2 TIMES DAILY
Qty: 30 ML | Refills: 2 | Status: SHIPPED | OUTPATIENT
Start: 2021-08-19 | End: 2021-11-08

## 2021-10-14 ENCOUNTER — HOSPITAL ENCOUNTER (OUTPATIENT)
Dept: RADIOLOGY | Facility: HOSPITAL | Age: 51
Discharge: HOME | End: 2021-10-14
Attending: SPECIALIST
Payer: COMMERCIAL

## 2021-10-14 VITALS — BODY MASS INDEX: 37.35 KG/M2 | HEIGHT: 67 IN | WEIGHT: 238 LBS

## 2021-10-14 DIAGNOSIS — Z12.31 BREAST CANCER SCREENING BY MAMMOGRAM: ICD-10-CM

## 2021-10-14 PROCEDURE — 77063 BREAST TOMOSYNTHESIS BI: CPT | Mod: 26,,, | Performed by: RADIOLOGY

## 2021-10-14 PROCEDURE — 77067 MAMMO DIGITAL SCREENING BILAT WITH TOMO: ICD-10-PCS | Mod: 26,,, | Performed by: RADIOLOGY

## 2021-10-14 PROCEDURE — 77067 SCR MAMMO BI INCL CAD: CPT | Mod: TC

## 2021-10-14 PROCEDURE — 77067 SCR MAMMO BI INCL CAD: CPT | Mod: 26,,, | Performed by: RADIOLOGY

## 2021-10-14 PROCEDURE — 77063 MAMMO DIGITAL SCREENING BILAT WITH TOMO: ICD-10-PCS | Mod: 26,,, | Performed by: RADIOLOGY

## 2022-02-03 ENCOUNTER — OFFICE VISIT (OUTPATIENT)
Dept: ORTHOPEDICS | Facility: CLINIC | Age: 52
End: 2022-02-03
Payer: COMMERCIAL

## 2022-02-03 ENCOUNTER — HOSPITAL ENCOUNTER (OUTPATIENT)
Dept: RADIOLOGY | Facility: HOSPITAL | Age: 52
Discharge: HOME | End: 2022-02-03
Attending: PHYSICIAN ASSISTANT
Payer: COMMERCIAL

## 2022-02-03 VITALS — HEIGHT: 67 IN | BODY MASS INDEX: 37.37 KG/M2 | WEIGHT: 238.13 LBS

## 2022-02-03 DIAGNOSIS — M25.571 RIGHT ANKLE PAIN, UNSPECIFIED CHRONICITY: ICD-10-CM

## 2022-02-03 DIAGNOSIS — M25.561 RIGHT KNEE PAIN, UNSPECIFIED CHRONICITY: Primary | ICD-10-CM

## 2022-02-03 DIAGNOSIS — M25.561 RIGHT KNEE PAIN, UNSPECIFIED CHRONICITY: ICD-10-CM

## 2022-02-03 PROCEDURE — 73562 X-RAY EXAM OF KNEE 3: CPT | Mod: 26,LT,, | Performed by: RADIOLOGY

## 2022-02-03 PROCEDURE — 1159F PR MEDICATION LIST DOCUMENTED IN MEDICAL RECORD: ICD-10-PCS | Mod: CPTII,S$GLB,, | Performed by: PHYSICIAN ASSISTANT

## 2022-02-03 PROCEDURE — 73610 X-RAY EXAM OF ANKLE: CPT | Mod: TC,RT

## 2022-02-03 PROCEDURE — 99999 PR PBB SHADOW E&M-EST. PATIENT-LVL IV: CPT | Mod: PBBFAC,,, | Performed by: PHYSICIAN ASSISTANT

## 2022-02-03 PROCEDURE — 1160F PR REVIEW ALL MEDS BY PRESCRIBER/CLIN PHARMACIST DOCUMENTED: ICD-10-PCS | Mod: CPTII,S$GLB,, | Performed by: PHYSICIAN ASSISTANT

## 2022-02-03 PROCEDURE — 1160F RVW MEDS BY RX/DR IN RCRD: CPT | Mod: CPTII,S$GLB,, | Performed by: PHYSICIAN ASSISTANT

## 2022-02-03 PROCEDURE — 73562 X-RAY EXAM OF KNEE 3: CPT | Mod: TC,LT

## 2022-02-03 PROCEDURE — 3008F BODY MASS INDEX DOCD: CPT | Mod: CPTII,S$GLB,, | Performed by: PHYSICIAN ASSISTANT

## 2022-02-03 PROCEDURE — 20610 DRAIN/INJ JOINT/BURSA W/O US: CPT | Mod: RT,S$GLB,, | Performed by: PHYSICIAN ASSISTANT

## 2022-02-03 PROCEDURE — 3008F PR BODY MASS INDEX (BMI) DOCUMENTED: ICD-10-PCS | Mod: CPTII,S$GLB,, | Performed by: PHYSICIAN ASSISTANT

## 2022-02-03 PROCEDURE — 73564 XR KNEE ORTHO RIGHT WITH FLEXION: ICD-10-PCS | Mod: 26,RT,, | Performed by: RADIOLOGY

## 2022-02-03 PROCEDURE — 1159F MED LIST DOCD IN RCRD: CPT | Mod: CPTII,S$GLB,, | Performed by: PHYSICIAN ASSISTANT

## 2022-02-03 PROCEDURE — 20605 PR DRAIN/INJECT INTERMEDIATE JOINT/BURSA: ICD-10-PCS | Mod: 51,RT,S$GLB, | Performed by: PHYSICIAN ASSISTANT

## 2022-02-03 PROCEDURE — 20610 PR DRAIN/INJECT LARGE JOINT/BURSA: ICD-10-PCS | Mod: RT,S$GLB,, | Performed by: PHYSICIAN ASSISTANT

## 2022-02-03 PROCEDURE — 73562 XR KNEE ORTHO RIGHT WITH FLEXION: ICD-10-PCS | Mod: 26,LT,, | Performed by: RADIOLOGY

## 2022-02-03 PROCEDURE — 99213 OFFICE O/P EST LOW 20 MIN: CPT | Mod: 25,S$GLB,, | Performed by: PHYSICIAN ASSISTANT

## 2022-02-03 PROCEDURE — 99213 PR OFFICE/OUTPT VISIT, EST, LEVL III, 20-29 MIN: ICD-10-PCS | Mod: 25,S$GLB,, | Performed by: PHYSICIAN ASSISTANT

## 2022-02-03 PROCEDURE — 73610 X-RAY EXAM OF ANKLE: CPT | Mod: 26,RT,, | Performed by: RADIOLOGY

## 2022-02-03 PROCEDURE — 73564 X-RAY EXAM KNEE 4 OR MORE: CPT | Mod: 26,RT,, | Performed by: RADIOLOGY

## 2022-02-03 PROCEDURE — 73610 XR ANKLE COMPLETE 3 VIEW RIGHT: ICD-10-PCS | Mod: 26,RT,, | Performed by: RADIOLOGY

## 2022-02-03 PROCEDURE — 99999 PR PBB SHADOW E&M-EST. PATIENT-LVL IV: ICD-10-PCS | Mod: PBBFAC,,, | Performed by: PHYSICIAN ASSISTANT

## 2022-02-03 PROCEDURE — 20605 DRAIN/INJ JOINT/BURSA W/O US: CPT | Mod: 51,RT,S$GLB, | Performed by: PHYSICIAN ASSISTANT

## 2022-02-03 RX ADMIN — TRIAMCINOLONE ACETONIDE 40 MG: 40 INJECTION, SUSPENSION INTRA-ARTICULAR; INTRAMUSCULAR at 09:02

## 2022-02-03 NOTE — PROGRESS NOTES
Subjective:      Patient ID: Ailyn Yanes is a 51 y.o. female.    Chief Complaint: Pain of the Right Knee and Pain of the Right Ankle    HPI  51 year old female presents with chief complaint of right knee pain x 6 months. No trauma. Pain is anterior and worse with certain weather. She has pain with walking. She took tylenol with no relief. She reports swelling and popping. She had right knee surgery in 2019 by Dr. Wolfe. She does not use assistive devices.   Patient reports right ankle pain x 6 months. She reports h/o sprains. Pain is lateral. It is intermittent with walking and DF. Tylenol did not help. She reports swelling and giving way. She wears inserts.   Review of Systems   Constitutional: Negative for chills, fever and night sweats.   Cardiovascular: Negative for chest pain.   Respiratory: Negative for cough and shortness of breath.    Hematologic/Lymphatic: Does not bruise/bleed easily.   Skin: Negative for color change.   Gastrointestinal: Negative for heartburn.   Genitourinary: Negative for dysuria.   Neurological: Negative for numbness and paresthesias.   Psychiatric/Behavioral: Negative for altered mental status.   Allergic/Immunologic: Negative for persistent infections.         Objective:            General    Vitals reviewed.  Constitutional: She is oriented to person, place, and time. She appears well-developed and well-nourished.   Cardiovascular: Normal rate.    Neurological: She is alert and oriented to person, place, and time.         Right Ankle/Foot Exam     Inspection   Erythema: absent  Effusion: Ankle - absent     Tenderness   The patient is tender to palpation of the ATF and lateral talar dome.    Range of Motion   The patient has normal right ankle ROM.    Tests   Anterior drawer: negative    Other   Sensation: normal        Vascular Exam     Right Pulses  Dorsalis Pedis:      2+            Right Knee Exam:  ROM 0-125 degrees  No effusion  +crepitus  TTP lateral joint line      X-rays  were ordered and reviewed by me. Mild OA at the knee. No significant bony abnormalities at the ankle.      Assessment:       Encounter Diagnoses   Name Primary?    Right knee pain, unspecified chronicity Yes    Right ankle pain, unspecified chronicity           Plan:       Discussed treatment options with patient. She would like right knee and right subtalar CSI. Ice and elevate. RTC prn.     PROCEDURE:  I have explained the risks, benefits, and alternatives of the procedure in detail.  The patient voices understanding and all questions have been answered.  The patient agrees to proceed as planned. So after I performed a sterile prep of the skin in the normal fashion the right knee is injected using a 22 gauge needle from the anterolateral approach with a combination of 4cc 1% plain lidocaine and 40 mg of kenalog.  The patient is cautioned and immediate relief of pain is secondary to the local anesthetic and will be temporary.  After the anesthetic wears off there may be a increase in pain that may last for a few hours or a few days and they should use ice to help alleviate this flair up of pain.     PROCEDURE:  I have explained the risks, benefits, and alternatives of the procedure in detail.  The patient voices understanding and all questions have been answered.  The patient agrees to proceed as planned. So after I performed a sterile prep of the skin in the normal fashion the right subtalar joint is injected using a 22 gauge needle with a combination of 1cc 1% plain lidocaine and 40 mg of kenalog.  The patient is cautioned and immediate relief of pain is secondary to the local anesthetic and will be temporary.  After the anesthetic wears off there may be a increase in pain that may last for a few hours or a few days and they should use ice to help alleviate this flair up of pain.     The patient is cautioned that the steroid injection may raise their blood sugar level temporarily and this may last for several  days.  The patient is instructed to watch their blood sugar closely and if it starts to rise to contact their primary care or diabetes provider.

## 2022-02-12 RX ORDER — TRIAMCINOLONE ACETONIDE 40 MG/ML
40 INJECTION, SUSPENSION INTRA-ARTICULAR; INTRAMUSCULAR
Status: COMPLETED | OUTPATIENT
Start: 2022-02-03 | End: 2022-02-03

## 2022-04-06 ENCOUNTER — TELEPHONE (OUTPATIENT)
Dept: ENDOCRINOLOGY | Facility: CLINIC | Age: 52
End: 2022-04-06

## 2022-04-06 DIAGNOSIS — E03.9 HYPOTHYROIDISM, UNSPECIFIED TYPE: Primary | ICD-10-CM

## 2022-04-06 DIAGNOSIS — E11.9 CONTROLLED TYPE 2 DIABETES MELLITUS WITHOUT COMPLICATION, WITHOUT LONG-TERM CURRENT USE OF INSULIN: ICD-10-CM

## 2022-05-11 ENCOUNTER — OFFICE VISIT (OUTPATIENT)
Dept: UROLOGY | Facility: CLINIC | Age: 52
End: 2022-05-11
Payer: COMMERCIAL

## 2022-05-11 VITALS
DIASTOLIC BLOOD PRESSURE: 79 MMHG | HEART RATE: 67 BPM | HEIGHT: 67 IN | BODY MASS INDEX: 37.27 KG/M2 | SYSTOLIC BLOOD PRESSURE: 121 MMHG | WEIGHT: 237.44 LBS

## 2022-05-11 DIAGNOSIS — E11.9 CONTROLLED TYPE 2 DIABETES MELLITUS WITHOUT COMPLICATION, WITHOUT LONG-TERM CURRENT USE OF INSULIN: ICD-10-CM

## 2022-05-11 DIAGNOSIS — R39.15 URINARY URGENCY: ICD-10-CM

## 2022-05-11 DIAGNOSIS — N20.0 NEPHROLITHIASIS: Primary | ICD-10-CM

## 2022-05-11 DIAGNOSIS — R10.9 ABDOMINAL PAIN, UNSPECIFIED ABDOMINAL LOCATION: ICD-10-CM

## 2022-05-11 DIAGNOSIS — R74.8 ELEVATED LIVER ENZYMES: ICD-10-CM

## 2022-05-11 LAB
BILIRUB SERPL-MCNC: NORMAL MG/DL
BLOOD URINE, POC: NORMAL
COLOR, POC UA: YELLOW
GLUCOSE UR QL STRIP: NORMAL
KETONES UR QL STRIP: NORMAL
LEUKOCYTE ESTERASE URINE, POC: NORMAL
NITRITE, POC UA: NORMAL
PH, POC UA: 5
PROTEIN, POC: NORMAL
SPECIFIC GRAVITY, POC UA: 1.01
UROBILINOGEN, POC UA: NORMAL

## 2022-05-11 PROCEDURE — 3078F PR MOST RECENT DIASTOLIC BLOOD PRESSURE < 80 MM HG: ICD-10-PCS | Mod: CPTII,S$GLB,, | Performed by: UROLOGY

## 2022-05-11 PROCEDURE — 99999 PR PBB SHADOW E&M-EST. PATIENT-LVL IV: CPT | Mod: PBBFAC,,, | Performed by: UROLOGY

## 2022-05-11 PROCEDURE — 99204 PR OFFICE/OUTPT VISIT, NEW, LEVL IV, 45-59 MIN: ICD-10-PCS | Mod: S$GLB,,, | Performed by: UROLOGY

## 2022-05-11 PROCEDURE — 1159F MED LIST DOCD IN RCRD: CPT | Mod: CPTII,S$GLB,, | Performed by: UROLOGY

## 2022-05-11 PROCEDURE — 1160F PR REVIEW ALL MEDS BY PRESCRIBER/CLIN PHARMACIST DOCUMENTED: ICD-10-PCS | Mod: CPTII,S$GLB,, | Performed by: UROLOGY

## 2022-05-11 PROCEDURE — 99999 PR PBB SHADOW E&M-EST. PATIENT-LVL IV: ICD-10-PCS | Mod: PBBFAC,,, | Performed by: UROLOGY

## 2022-05-11 PROCEDURE — 81001 POCT URINALYSIS, DIPSTICK OR TABLET REAGENT, AUTOMATED, WITH MICROSCOP: ICD-10-PCS | Mod: S$GLB,,, | Performed by: UROLOGY

## 2022-05-11 PROCEDURE — 87086 URINE CULTURE/COLONY COUNT: CPT | Performed by: UROLOGY

## 2022-05-11 PROCEDURE — 3074F SYST BP LT 130 MM HG: CPT | Mod: CPTII,S$GLB,, | Performed by: UROLOGY

## 2022-05-11 PROCEDURE — 3078F DIAST BP <80 MM HG: CPT | Mod: CPTII,S$GLB,, | Performed by: UROLOGY

## 2022-05-11 PROCEDURE — 81001 URINALYSIS AUTO W/SCOPE: CPT | Mod: S$GLB,,, | Performed by: UROLOGY

## 2022-05-11 PROCEDURE — 1160F RVW MEDS BY RX/DR IN RCRD: CPT | Mod: CPTII,S$GLB,, | Performed by: UROLOGY

## 2022-05-11 PROCEDURE — 3074F PR MOST RECENT SYSTOLIC BLOOD PRESSURE < 130 MM HG: ICD-10-PCS | Mod: CPTII,S$GLB,, | Performed by: UROLOGY

## 2022-05-11 PROCEDURE — 1159F PR MEDICATION LIST DOCUMENTED IN MEDICAL RECORD: ICD-10-PCS | Mod: CPTII,S$GLB,, | Performed by: UROLOGY

## 2022-05-11 PROCEDURE — 3008F PR BODY MASS INDEX (BMI) DOCUMENTED: ICD-10-PCS | Mod: CPTII,S$GLB,, | Performed by: UROLOGY

## 2022-05-11 PROCEDURE — 3008F BODY MASS INDEX DOCD: CPT | Mod: CPTII,S$GLB,, | Performed by: UROLOGY

## 2022-05-11 PROCEDURE — 99204 OFFICE O/P NEW MOD 45 MIN: CPT | Mod: S$GLB,,, | Performed by: UROLOGY

## 2022-05-11 NOTE — PROGRESS NOTES
"Urology - [facility]  Clinic Note    SUBJECTIVE:     Chief Complaint: kidney stones    History of Present Illness:  Aliyn Yanes is a 51 y.o. female who presents to clinic for kidney stones. She is new to our clinic. Referral from Self, Aaareferral     Seen previously by Dr. Asher. Passed a stone in 2019. CT 2019 showed no stones at all.   H/o at least 3 previous stone episodes. H/o what sounds like ureteroscopy in the past.     Right lower back pain, intermittent 1 week ago. Given antibiotics. Never seen.   Pain better, was a 9. No nausea/vomiting, fever. Had pain last night - scale of 6.   No dysuria. Didn't pass a stone. Did use a strainer. No gross hematuria.   Always has urinary urgency - on a diuretic and had increased fluids. HCTZ 25mg a day    No vit C, tums or rolaids.   Mostly fruits and veggies.   No spinach, beets.   Almonds on occasion.   Good bit of salt and animal protein.     Anticoagulation:     OBJECTIVE:     Estimated body mass index is 37.19 kg/m² as calculated from the following:    Height as of this encounter: 5' 7" (1.702 m).    Weight as of this encounter: 107.7 kg (237 lb 7 oz).    Vital Signs (Most Recent)  Pulse: 67 (05/11/22 1146)  BP: 121/79 (05/11/22 1146)    Physical Exam  Vitals reviewed.   Pulmonary:      Effort: Pulmonary effort is normal.   Abdominal:      Tenderness: There is no right CVA tenderness.         Lab Results   Component Value Date    BUN 13 11/19/2020    CREATININE 0.8 11/19/2020    WBC 12.50 02/08/2020    HGB 10.9 (L) 02/08/2020    HCT 32.4 (L) 02/08/2020     02/08/2020    AST 15 11/19/2020    ALT 22 11/19/2020    ALKPHOS 99 11/19/2020    ALBUMIN 4.2 11/19/2020    HGBA1C 6.1 (H) 08/28/2019        Imaging:  No recent imaging.       Urine dip ph 5,   ASSESSMENT     1. Nephrolithiasis    2. Abdominal pain, unspecified abdominal location    3. Controlled type 2 diabetes mellitus without complication, without long-term current use of insulin    4. " Elevated liver enzymes      PLAN:   Ailyn was seen today for other.    Diagnoses and all orders for this visit:    Nephrolithiasis  -     POCT urinalysis, dipstick or tablet reag    Abdominal pain, unspecified abdominal location  -     CT Renal Stone Study ABD Pelvis WO; Future    Controlled type 2 diabetes mellitus without complication, without long-term current use of insulin    Elevated liver enzymes    Urinary urgency  -     Urine culture      Stop antibiotics.     Gayatri Reyes MD

## 2022-05-13 LAB — BACTERIA UR CULT: NO GROWTH

## 2022-05-18 ENCOUNTER — HOSPITAL ENCOUNTER (OUTPATIENT)
Dept: RADIOLOGY | Facility: HOSPITAL | Age: 52
Discharge: HOME | End: 2022-05-18
Attending: UROLOGY
Payer: COMMERCIAL

## 2022-05-18 DIAGNOSIS — R10.9 ABDOMINAL PAIN, UNSPECIFIED ABDOMINAL LOCATION: ICD-10-CM

## 2022-05-18 PROCEDURE — 74176 CT ABD & PELVIS W/O CONTRAST: CPT | Mod: 26,,, | Performed by: RADIOLOGY

## 2022-05-18 PROCEDURE — 74176 CT RENAL STONE STUDY ABD PELVIS WO: ICD-10-PCS | Mod: 26,,, | Performed by: RADIOLOGY

## 2022-05-18 PROCEDURE — 74176 CT ABD & PELVIS W/O CONTRAST: CPT | Mod: TC

## 2022-08-05 ENCOUNTER — LAB VISIT (OUTPATIENT)
Dept: LAB | Facility: HOSPITAL | Age: 52
End: 2022-08-05
Attending: INTERNAL MEDICINE
Payer: COMMERCIAL

## 2022-08-05 DIAGNOSIS — E03.9 HYPOTHYROIDISM, UNSPECIFIED TYPE: ICD-10-CM

## 2022-08-05 DIAGNOSIS — E11.9 CONTROLLED TYPE 2 DIABETES MELLITUS WITHOUT COMPLICATION, WITHOUT LONG-TERM CURRENT USE OF INSULIN: ICD-10-CM

## 2022-08-05 LAB
ALBUMIN SERPL BCP-MCNC: 4.1 G/DL (ref 3.5–5.2)
ALP SERPL-CCNC: 100 U/L (ref 55–135)
ALT SERPL W/O P-5'-P-CCNC: 29 U/L (ref 10–44)
ANION GAP SERPL CALC-SCNC: 9 MMOL/L (ref 8–16)
AST SERPL-CCNC: 18 U/L (ref 10–40)
BILIRUB SERPL-MCNC: 0.6 MG/DL (ref 0.1–1)
BUN SERPL-MCNC: 15 MG/DL (ref 6–20)
CALCIUM SERPL-MCNC: 10.4 MG/DL (ref 8.7–10.5)
CHLORIDE SERPL-SCNC: 103 MMOL/L (ref 95–110)
CO2 SERPL-SCNC: 28 MMOL/L (ref 23–29)
CREAT SERPL-MCNC: 0.8 MG/DL (ref 0.5–1.4)
EST. GFR  (NO RACE VARIABLE): >60 ML/MIN/1.73 M^2
ESTIMATED AVG GLUCOSE: 123 MG/DL (ref 68–131)
GLUCOSE SERPL-MCNC: 85 MG/DL (ref 70–110)
HBA1C MFR BLD: 5.9 % (ref 4–5.6)
POTASSIUM SERPL-SCNC: 4.7 MMOL/L (ref 3.5–5.1)
PROT SERPL-MCNC: 7.8 G/DL (ref 6–8.4)
SODIUM SERPL-SCNC: 140 MMOL/L (ref 136–145)
TSH SERPL DL<=0.005 MIU/L-ACNC: 0.6 UIU/ML (ref 0.4–4)

## 2022-08-05 PROCEDURE — 36415 COLL VENOUS BLD VENIPUNCTURE: CPT | Performed by: INTERNAL MEDICINE

## 2022-08-05 PROCEDURE — 80053 COMPREHEN METABOLIC PANEL: CPT | Performed by: INTERNAL MEDICINE

## 2022-08-05 PROCEDURE — 83036 HEMOGLOBIN GLYCOSYLATED A1C: CPT | Performed by: INTERNAL MEDICINE

## 2022-08-05 PROCEDURE — 84443 ASSAY THYROID STIM HORMONE: CPT | Performed by: INTERNAL MEDICINE

## 2022-08-09 ENCOUNTER — OFFICE VISIT (OUTPATIENT)
Dept: ENDOCRINOLOGY | Facility: CLINIC | Age: 52
End: 2022-08-09
Payer: COMMERCIAL

## 2022-08-09 VITALS
HEIGHT: 67 IN | OXYGEN SATURATION: 99 % | WEIGHT: 246.69 LBS | DIASTOLIC BLOOD PRESSURE: 72 MMHG | HEART RATE: 60 BPM | BODY MASS INDEX: 38.72 KG/M2 | SYSTOLIC BLOOD PRESSURE: 130 MMHG | RESPIRATION RATE: 16 BRPM

## 2022-08-09 DIAGNOSIS — E11.9 CONTROLLED TYPE 2 DIABETES MELLITUS WITHOUT COMPLICATION, WITHOUT LONG-TERM CURRENT USE OF INSULIN: ICD-10-CM

## 2022-08-09 DIAGNOSIS — N95.1 MENOPAUSAL SYMPTOMS: ICD-10-CM

## 2022-08-09 DIAGNOSIS — E03.9 HYPOTHYROIDISM, UNSPECIFIED TYPE: ICD-10-CM

## 2022-08-09 PROCEDURE — 3008F PR BODY MASS INDEX (BMI) DOCUMENTED: ICD-10-PCS | Mod: CPTII,S$GLB,, | Performed by: INTERNAL MEDICINE

## 2022-08-09 PROCEDURE — 1159F PR MEDICATION LIST DOCUMENTED IN MEDICAL RECORD: ICD-10-PCS | Mod: CPTII,S$GLB,, | Performed by: INTERNAL MEDICINE

## 2022-08-09 PROCEDURE — 3078F PR MOST RECENT DIASTOLIC BLOOD PRESSURE < 80 MM HG: ICD-10-PCS | Mod: CPTII,S$GLB,, | Performed by: INTERNAL MEDICINE

## 2022-08-09 PROCEDURE — 3075F SYST BP GE 130 - 139MM HG: CPT | Mod: CPTII,S$GLB,, | Performed by: INTERNAL MEDICINE

## 2022-08-09 PROCEDURE — 1160F RVW MEDS BY RX/DR IN RCRD: CPT | Mod: CPTII,S$GLB,, | Performed by: INTERNAL MEDICINE

## 2022-08-09 PROCEDURE — 3044F PR MOST RECENT HEMOGLOBIN A1C LEVEL <7.0%: ICD-10-PCS | Mod: CPTII,S$GLB,, | Performed by: INTERNAL MEDICINE

## 2022-08-09 PROCEDURE — 3008F BODY MASS INDEX DOCD: CPT | Mod: CPTII,S$GLB,, | Performed by: INTERNAL MEDICINE

## 2022-08-09 PROCEDURE — 1160F PR REVIEW ALL MEDS BY PRESCRIBER/CLIN PHARMACIST DOCUMENTED: ICD-10-PCS | Mod: CPTII,S$GLB,, | Performed by: INTERNAL MEDICINE

## 2022-08-09 PROCEDURE — 99999 PR PBB SHADOW E&M-EST. PATIENT-LVL IV: CPT | Mod: PBBFAC,,, | Performed by: INTERNAL MEDICINE

## 2022-08-09 PROCEDURE — 3075F PR MOST RECENT SYSTOLIC BLOOD PRESS GE 130-139MM HG: ICD-10-PCS | Mod: CPTII,S$GLB,, | Performed by: INTERNAL MEDICINE

## 2022-08-09 PROCEDURE — 99214 OFFICE O/P EST MOD 30 MIN: CPT | Mod: S$GLB,,, | Performed by: INTERNAL MEDICINE

## 2022-08-09 PROCEDURE — 99999 PR PBB SHADOW E&M-EST. PATIENT-LVL IV: ICD-10-PCS | Mod: PBBFAC,,, | Performed by: INTERNAL MEDICINE

## 2022-08-09 PROCEDURE — 3044F HG A1C LEVEL LT 7.0%: CPT | Mod: CPTII,S$GLB,, | Performed by: INTERNAL MEDICINE

## 2022-08-09 PROCEDURE — 3078F DIAST BP <80 MM HG: CPT | Mod: CPTII,S$GLB,, | Performed by: INTERNAL MEDICINE

## 2022-08-09 PROCEDURE — 1159F MED LIST DOCD IN RCRD: CPT | Mod: CPTII,S$GLB,, | Performed by: INTERNAL MEDICINE

## 2022-08-09 PROCEDURE — 99214 PR OFFICE/OUTPT VISIT, EST, LEVL IV, 30-39 MIN: ICD-10-PCS | Mod: S$GLB,,, | Performed by: INTERNAL MEDICINE

## 2022-08-09 RX ORDER — CLONIDINE HYDROCHLORIDE 0.1 MG/1
TABLET ORAL
Qty: 90 TABLET | Refills: 4 | Status: SHIPPED | OUTPATIENT
Start: 2022-08-09

## 2022-08-09 NOTE — ASSESSMENT & PLAN NOTE
No complications   Well controlled generally, but not tolerating metformin as well (since gallbladder surgery)  Will try and reduce dose to 500 mg one tablet daily     Start over the counter B12 vitamin

## 2022-08-09 NOTE — PROGRESS NOTES
"Subjective:      Patient ID: Ailyn Yanes is a 51 y.o. female.    Chief Complaint:  Follow-up      History of Present Illness    Ms. Yanes is a 51 year old woman with hashimoto's hypothyroidism, this was diagnosed 11 years ago.      Cholecystectomy in 2020    She feels low energy. Denies constipation, feels both cold and heat sensitivity. Denies depressed mood but she is taking zoloft. Does not wake up feeling refreshed. She wakes up several times a day.      Current regimen: levothyroxine 150 mcg daily and two pills on Sunday. Takes it appropriately.     Has prediabetes managed on metformin.   Her last A1C is 5.9%.   Cutting back on portions.     Current medication:  Metformin 500 mg two tablets daily. Has diarrhea that occurs regularly.   Exercise: physical therapy due to knee pain    Denies chest pain, pressure or shortness of breath   Denies numbness burning or tingling sensation  Eye exam up to date    Father has history of MI    Review of Systems    Objective:   Physical Exam  Vitals:    08/09/22 1326   BP: 130/72   Pulse: 60   Resp: 16   SpO2: 99%   Weight: 111.9 kg (246 lb 11.1 oz)   Height: 5' 7" (1.702 m)       BP Readings from Last 3 Encounters:   08/09/22 130/72   05/11/22 121/79   03/19/21 (!) 140/86     Wt Readings from Last 1 Encounters:   08/09/22 1326 111.9 kg (246 lb 11.1 oz)         Body mass index is 38.64 kg/m².    Lab Review:   Lab Results   Component Value Date    HGBA1C 5.9 (H) 08/05/2022     Lab Results   Component Value Date    CHOL 126 12/12/2017    HDL 39 (L) 12/12/2017    LDLCALC 73.6 12/12/2017    TRIG 67 12/12/2017    CHOLHDL 31.0 12/12/2017     Lab Results   Component Value Date     08/05/2022    K 4.7 08/05/2022     08/05/2022    CO2 28 08/05/2022    GLU 85 08/05/2022    BUN 15 08/05/2022    CREATININE 0.8 08/05/2022    CALCIUM 10.4 08/05/2022    PROT 7.8 08/05/2022    ALBUMIN 4.1 08/05/2022    BILITOT 0.6 08/05/2022    ALKPHOS 100 08/05/2022    AST 18 08/05/2022    " ALT 29 08/05/2022    ANIONGAP 9 08/05/2022    ESTGFRAFRICA >60 11/19/2020    EGFRNONAA >60 11/19/2020    TSH 0.600 08/05/2022         Assessment and Plan     Controlled type 2 diabetes mellitus without complication, without long-term current use of insulin  No complications   Well controlled generally, but not tolerating metformin as well (since gallbladder surgery)  Will try and reduce dose to 500 mg one tablet daily     Start over the counter B12 vitamin        Hypothyroidism  Reduce thyroid hormone replacement to 150 mcg 7 1/2 pills weekly   Repeat TSH in six months.     Menopausal symptoms  Takes clonidine 0.1 mg at bedtime   Has helped with night sweats.   To continue

## 2022-10-18 ENCOUNTER — HOSPITAL ENCOUNTER (OUTPATIENT)
Dept: RADIOLOGY | Facility: HOSPITAL | Age: 52
Discharge: HOME | End: 2022-10-18
Attending: SPECIALIST
Payer: COMMERCIAL

## 2022-10-18 VITALS — WEIGHT: 246 LBS | HEIGHT: 67 IN | BODY MASS INDEX: 38.61 KG/M2

## 2022-10-18 DIAGNOSIS — Z12.31 BREAST CANCER SCREENING BY MAMMOGRAM: ICD-10-CM

## 2022-10-18 PROCEDURE — 77063 BREAST TOMOSYNTHESIS BI: CPT | Mod: TC

## 2022-10-18 PROCEDURE — 77063 MAMMO DIGITAL SCREENING BILAT WITH TOMO: ICD-10-PCS | Mod: 26,,, | Performed by: RADIOLOGY

## 2022-10-18 PROCEDURE — 77067 MAMMO DIGITAL SCREENING BILAT WITH TOMO: ICD-10-PCS | Mod: 26,,, | Performed by: RADIOLOGY

## 2022-10-18 PROCEDURE — 77063 BREAST TOMOSYNTHESIS BI: CPT | Mod: 26,,, | Performed by: RADIOLOGY

## 2022-10-18 PROCEDURE — 77067 SCR MAMMO BI INCL CAD: CPT | Mod: 26,,, | Performed by: RADIOLOGY

## 2023-02-09 ENCOUNTER — LAB VISIT (OUTPATIENT)
Dept: LAB | Facility: HOSPITAL | Age: 53
End: 2023-02-09
Attending: INTERNAL MEDICINE
Payer: COMMERCIAL

## 2023-02-09 DIAGNOSIS — E03.9 HYPOTHYROIDISM, UNSPECIFIED TYPE: ICD-10-CM

## 2023-02-09 DIAGNOSIS — E11.9 CONTROLLED TYPE 2 DIABETES MELLITUS WITHOUT COMPLICATION, WITHOUT LONG-TERM CURRENT USE OF INSULIN: ICD-10-CM

## 2023-02-09 LAB
ESTIMATED AVG GLUCOSE: 128 MG/DL (ref 68–131)
HBA1C MFR BLD: 6.1 % (ref 4–5.6)
TSH SERPL DL<=0.005 MIU/L-ACNC: 1.04 UIU/ML (ref 0.4–4)

## 2023-02-09 PROCEDURE — 83036 HEMOGLOBIN GLYCOSYLATED A1C: CPT | Performed by: INTERNAL MEDICINE

## 2023-02-09 PROCEDURE — 84443 ASSAY THYROID STIM HORMONE: CPT | Performed by: INTERNAL MEDICINE

## 2023-02-09 PROCEDURE — 36415 COLL VENOUS BLD VENIPUNCTURE: CPT | Performed by: INTERNAL MEDICINE

## 2023-03-29 ENCOUNTER — OFFICE VISIT (OUTPATIENT)
Dept: PODIATRY | Facility: CLINIC | Age: 53
End: 2023-03-29
Payer: COMMERCIAL

## 2023-03-29 VITALS
DIASTOLIC BLOOD PRESSURE: 68 MMHG | HEART RATE: 56 BPM | BODY MASS INDEX: 38.85 KG/M2 | SYSTOLIC BLOOD PRESSURE: 122 MMHG | WEIGHT: 247.56 LBS | HEIGHT: 67 IN

## 2023-03-29 DIAGNOSIS — E11.9 CONTROLLED TYPE 2 DIABETES MELLITUS WITHOUT COMPLICATION, WITHOUT LONG-TERM CURRENT USE OF INSULIN: Primary | ICD-10-CM

## 2023-03-29 DIAGNOSIS — M76.71 PERONEUS BREVIS TENDINITIS, RIGHT: ICD-10-CM

## 2023-03-29 DIAGNOSIS — M25.571 ACUTE RIGHT ANKLE PAIN: ICD-10-CM

## 2023-03-29 PROCEDURE — 99999 PR PBB SHADOW E&M-EST. PATIENT-LVL III: CPT | Mod: PBBFAC,,, | Performed by: PODIATRIST

## 2023-03-29 PROCEDURE — 3074F SYST BP LT 130 MM HG: CPT | Mod: CPTII,S$GLB,, | Performed by: PODIATRIST

## 2023-03-29 PROCEDURE — 3078F DIAST BP <80 MM HG: CPT | Mod: CPTII,S$GLB,, | Performed by: PODIATRIST

## 2023-03-29 PROCEDURE — 3008F BODY MASS INDEX DOCD: CPT | Mod: CPTII,S$GLB,, | Performed by: PODIATRIST

## 2023-03-29 PROCEDURE — 3074F PR MOST RECENT SYSTOLIC BLOOD PRESSURE < 130 MM HG: ICD-10-PCS | Mod: CPTII,S$GLB,, | Performed by: PODIATRIST

## 2023-03-29 PROCEDURE — 99203 OFFICE O/P NEW LOW 30 MIN: CPT | Mod: S$GLB,,, | Performed by: PODIATRIST

## 2023-03-29 PROCEDURE — 3044F PR MOST RECENT HEMOGLOBIN A1C LEVEL <7.0%: ICD-10-PCS | Mod: CPTII,S$GLB,, | Performed by: PODIATRIST

## 2023-03-29 PROCEDURE — 3044F HG A1C LEVEL LT 7.0%: CPT | Mod: CPTII,S$GLB,, | Performed by: PODIATRIST

## 2023-03-29 PROCEDURE — 1159F PR MEDICATION LIST DOCUMENTED IN MEDICAL RECORD: ICD-10-PCS | Mod: CPTII,S$GLB,, | Performed by: PODIATRIST

## 2023-03-29 PROCEDURE — 3008F PR BODY MASS INDEX (BMI) DOCUMENTED: ICD-10-PCS | Mod: CPTII,S$GLB,, | Performed by: PODIATRIST

## 2023-03-29 PROCEDURE — 1160F RVW MEDS BY RX/DR IN RCRD: CPT | Mod: CPTII,S$GLB,, | Performed by: PODIATRIST

## 2023-03-29 PROCEDURE — 3078F PR MOST RECENT DIASTOLIC BLOOD PRESSURE < 80 MM HG: ICD-10-PCS | Mod: CPTII,S$GLB,, | Performed by: PODIATRIST

## 2023-03-29 PROCEDURE — 99203 PR OFFICE/OUTPT VISIT, NEW, LEVL III, 30-44 MIN: ICD-10-PCS | Mod: S$GLB,,, | Performed by: PODIATRIST

## 2023-03-29 PROCEDURE — 1160F PR REVIEW ALL MEDS BY PRESCRIBER/CLIN PHARMACIST DOCUMENTED: ICD-10-PCS | Mod: CPTII,S$GLB,, | Performed by: PODIATRIST

## 2023-03-29 PROCEDURE — 1159F MED LIST DOCD IN RCRD: CPT | Mod: CPTII,S$GLB,, | Performed by: PODIATRIST

## 2023-03-29 PROCEDURE — 99999 PR PBB SHADOW E&M-EST. PATIENT-LVL III: ICD-10-PCS | Mod: PBBFAC,,, | Performed by: PODIATRIST

## 2023-03-29 RX ORDER — AZITHROMYCIN 250 MG/1
TABLET, FILM COATED ORAL
COMMUNITY
End: 2023-09-13

## 2023-03-29 RX ORDER — GUAIFENESIN 1200 MG/1
TABLET, EXTENDED RELEASE ORAL
COMMUNITY
End: 2023-09-13

## 2023-03-29 NOTE — PROGRESS NOTES
Subjective:     Patient ID: Ailyn Yanes is a 52 y.o. female.    Chief Complaint: Foot Pain (Right foot/ankle) and Diabetes Mellitus (PCP- 08/22/2022/Verito Nielson (Endo))    Ailyn is a 52 y.o. female who presents to the podiatry clinic  with complaint of  right ankle pain. Onset of the symptoms was  2 months ago . Precipitating event: none known but has had numerous ankle sprains in the past most recently 6 months ago Current symptoms include: ability to bear weight, but with some pain, stiffness, and worsening symptoms after a period of activity. Aggravating factors: any weight bearing and walking. Symptoms have progressed to a point and plateaued. Patient has had prior foot problems. Evaluation to date: none. Treatment to date: rest and OTC Salonpas which only helps a little. Patients rates pain 2/10 on pain scale usually but sometimes reaches 8/10 based on activity. Uses OTC insoles in shoes to help with hx of plantar fasciitis.     Review of Systems   Constitutional: Negative for chills and fever.   Cardiovascular:  Negative for chest pain, claudication and leg swelling.   Respiratory:  Negative for cough and shortness of breath.    Skin:  Negative for dry skin and nail changes.   Musculoskeletal:  Positive for myalgias and stiffness.        Ankle and foot pain R   Gastrointestinal:  Negative for nausea and vomiting.   Neurological:  Negative for numbness and paresthesias.   Psychiatric/Behavioral:  Negative for altered mental status.       Objective:     Physical Exam  Vitals reviewed.   Constitutional:       Appearance: She is well-developed.   HENT:      Head: Normocephalic.   Cardiovascular:      Pulses:           Dorsalis pedis pulses are 2+ on the right side and 2+ on the left side.        Posterior tibial pulses are 2+ on the right side and 2+ on the left side.      Comments: DP and PT pulses are 2/4 bilaterally.        Pulmonary:      Effort: No respiratory distress.   Musculoskeletal:      Right  lower leg: No edema.      Left lower leg: No edema.      Comments: Mild to moderate pain with palpation of lateral R ankle along PB/PL tendon course posterior to fibula and onto the insertion of the PB tendon on lateral plantar base of 5th metatarsal. No pain along PL tendon along plantar arch course or insertion. Mild associated edema to R lateral ankle.     MMT 5/5 in DF/PF/Inv/Ev with pain on DF/Ev resistance along PB tendon course.     Hypermobility noted to 1st ray b/l with near complete collapse of medial longitudinal arch b/l with loading.     Equinus contracture noted to b/l ankles with knee straight and slightly improved with knee bent.     Mild pain with palpation of plantar medial calcaneal tuber R. No pain with lateral calc squeeze. No pain with STJ ROM or palpation of Achilles tendon/insertion, Abductor hallucis muscle belly, TA tendon/insertion R.      Skin:     General: Skin is warm and dry.      Findings: No erythema.      Comments: No open lesions, lacerations or wounds noted. Nails are normal to R 1-5 and L 1-5. Interdigital spaces clean, dry and intact b/l. No erythema noted to b/l foot. Skin texture normal. Pedal hair normal. No bruising or ecchymosis noted to R foot or ankle.      Neurological:      Mental Status: She is alert and oriented to person, place, and time.      Sensory: No sensory deficit.      Comments: Light touch, proprioception, and sharp/dull sensation are all intact bilaterally.     Psychiatric:         Behavior: Behavior normal.         Thought Content: Thought content normal.         Judgment: Judgment normal.         Assessment:      Encounter Diagnoses   Name Primary?    Controlled type 2 diabetes mellitus without complication, without long-term current use of insulin Yes    Peroneus brevis tendinitis, right     Acute right ankle pain      Plan:     Ailyn was seen today for foot pain and diabetes mellitus.    Diagnoses and all orders for this visit:    Controlled type 2  diabetes mellitus without complication, without long-term current use of insulin    Peroneus brevis tendinitis, right    Acute right ankle pain      I counseled the patient on her conditions, their implications and medical management.    Unable to take oral or topical NSAIDs due to allergy.     Continue OTC topical pain relievers (Salonpas, Aspercreme, Biofreeze etc).     The patient is advised to apply ice or cold packs intermittently as needed to relieve pain.    Dispensed and applied ankle brace to affected side for added support of the joint and to decrease motion and improve pain and stability. Advised to use at all times while ambulating.     Continue comfortable shoes with arch supports as directed.     RTC 3 weeks, sooner PRN ** consider PT

## 2023-04-19 ENCOUNTER — OFFICE VISIT (OUTPATIENT)
Dept: PODIATRY | Facility: CLINIC | Age: 53
End: 2023-04-19
Payer: COMMERCIAL

## 2023-04-19 VITALS
DIASTOLIC BLOOD PRESSURE: 70 MMHG | BODY MASS INDEX: 38.89 KG/M2 | SYSTOLIC BLOOD PRESSURE: 131 MMHG | HEART RATE: 60 BPM | HEIGHT: 67 IN | WEIGHT: 247.81 LBS

## 2023-04-19 DIAGNOSIS — M76.71 PERONEAL TENDINITIS, RIGHT: ICD-10-CM

## 2023-04-19 DIAGNOSIS — M25.571 RIGHT ANKLE PAIN, UNSPECIFIED CHRONICITY: ICD-10-CM

## 2023-04-19 DIAGNOSIS — M77.8 TENDINITIS OF EXTENSOR TENDON OF RIGHT HAND: Primary | ICD-10-CM

## 2023-04-19 PROCEDURE — 3008F PR BODY MASS INDEX (BMI) DOCUMENTED: ICD-10-PCS | Mod: CPTII,S$GLB,, | Performed by: PODIATRIST

## 2023-04-19 PROCEDURE — 1159F MED LIST DOCD IN RCRD: CPT | Mod: CPTII,S$GLB,, | Performed by: PODIATRIST

## 2023-04-19 PROCEDURE — 3008F BODY MASS INDEX DOCD: CPT | Mod: CPTII,S$GLB,, | Performed by: PODIATRIST

## 2023-04-19 PROCEDURE — 99213 OFFICE O/P EST LOW 20 MIN: CPT | Mod: S$GLB,,, | Performed by: PODIATRIST

## 2023-04-19 PROCEDURE — 3075F PR MOST RECENT SYSTOLIC BLOOD PRESS GE 130-139MM HG: ICD-10-PCS | Mod: CPTII,S$GLB,, | Performed by: PODIATRIST

## 2023-04-19 PROCEDURE — 3044F HG A1C LEVEL LT 7.0%: CPT | Mod: CPTII,S$GLB,, | Performed by: PODIATRIST

## 2023-04-19 PROCEDURE — 3078F DIAST BP <80 MM HG: CPT | Mod: CPTII,S$GLB,, | Performed by: PODIATRIST

## 2023-04-19 PROCEDURE — 1159F PR MEDICATION LIST DOCUMENTED IN MEDICAL RECORD: ICD-10-PCS | Mod: CPTII,S$GLB,, | Performed by: PODIATRIST

## 2023-04-19 PROCEDURE — 99999 PR PBB SHADOW E&M-EST. PATIENT-LVL IV: ICD-10-PCS | Mod: PBBFAC,,, | Performed by: PODIATRIST

## 2023-04-19 PROCEDURE — 1160F PR REVIEW ALL MEDS BY PRESCRIBER/CLIN PHARMACIST DOCUMENTED: ICD-10-PCS | Mod: CPTII,S$GLB,, | Performed by: PODIATRIST

## 2023-04-19 PROCEDURE — 99213 PR OFFICE/OUTPT VISIT, EST, LEVL III, 20-29 MIN: ICD-10-PCS | Mod: S$GLB,,, | Performed by: PODIATRIST

## 2023-04-19 PROCEDURE — 1160F RVW MEDS BY RX/DR IN RCRD: CPT | Mod: CPTII,S$GLB,, | Performed by: PODIATRIST

## 2023-04-19 PROCEDURE — 3044F PR MOST RECENT HEMOGLOBIN A1C LEVEL <7.0%: ICD-10-PCS | Mod: CPTII,S$GLB,, | Performed by: PODIATRIST

## 2023-04-19 PROCEDURE — 3078F PR MOST RECENT DIASTOLIC BLOOD PRESSURE < 80 MM HG: ICD-10-PCS | Mod: CPTII,S$GLB,, | Performed by: PODIATRIST

## 2023-04-19 PROCEDURE — 3075F SYST BP GE 130 - 139MM HG: CPT | Mod: CPTII,S$GLB,, | Performed by: PODIATRIST

## 2023-04-19 PROCEDURE — 99999 PR PBB SHADOW E&M-EST. PATIENT-LVL IV: CPT | Mod: PBBFAC,,, | Performed by: PODIATRIST

## 2023-04-19 NOTE — PROGRESS NOTES
"Subjective:     Patient ID: Ailyn Yanes is a 52 y.o. female.    Chief Complaint: Follow-up (Right foot pain)    Ailyn is a 52 y.o. female who presents to the podiatry clinic  with complaint of  right ankle pain. Onset of the symptoms was  2 months ago . Precipitating event: none known but has had numerous ankle sprains in the past most recently 6 months ago Current symptoms include: ability to bear weight, but with some pain, stiffness, and worsening symptoms after a period of activity. Aggravating factors: any weight bearing and walking. Symptoms have progressed to a point and plateaued. Patient has had prior foot problems. Evaluation to date: none. Treatment to date: rest and OTC Salonpas which only helps a little. Patients rates pain 2/10 on pain scale usually but sometimes reaches 8/10 based on activity. Uses OTC insoles in shoes to help with hx of plantar fasciitis.     04/19/2023: follow up for R ankle pain and tendinitis. Has been using ankle brace which has helped a lot. Using OTC topical analgesics which help. Pain improved but not 100%. Still feels some stiffness and tightness. Here for further recommendations.       Vitals:    04/19/23 1348   BP: 131/70   Pulse: 60   Weight: 112.4 kg (247 lb 12.8 oz)   Height: 5' 7" (1.702 m)   PainSc: 0-No pain         Review of Systems   Constitutional: Negative for chills and fever.   Cardiovascular:  Negative for chest pain, claudication and leg swelling.   Respiratory:  Negative for cough and shortness of breath.    Skin:  Negative for dry skin and nail changes.   Musculoskeletal:  Positive for myalgias and stiffness.        Ankle and foot pain R   Gastrointestinal:  Negative for nausea and vomiting.   Neurological:  Negative for numbness and paresthesias.   Psychiatric/Behavioral:  Negative for altered mental status.       Objective:     Physical Exam  Vitals reviewed.   Constitutional:       Appearance: She is well-developed.   HENT:      Head: Normocephalic. "   Cardiovascular:      Pulses:           Dorsalis pedis pulses are 2+ on the right side and 2+ on the left side.        Posterior tibial pulses are 2+ on the right side and 2+ on the left side.      Comments: DP and PT pulses are 2/4 bilaterally.        Pulmonary:      Effort: No respiratory distress.   Musculoskeletal:      Right lower leg: No edema.      Left lower leg: No edema.      Comments: Mild pain with palpation of lateral R ankle along PB/PL tendon course posterior to fibula and onto the insertion of the PB tendon on lateral plantar base of 5th metatarsal. No pain along PL tendon along plantar arch course or insertion. Mild associated edema to R lateral ankle. Mild pain anterior ankle along peroneus tertius tendon and EDL tendons anterior to ankle only.     MMT 5/5 in DF/PF/Inv/Ev with pain on DF/Ev resistance along PB/PT and EDL tendon course.     Hypermobility noted to 1st ray b/l with near complete collapse of medial longitudinal arch b/l with loading.     Equinus contracture noted to b/l ankles with knee straight and slightly improved with knee bent.     Mild pain with palpation of plantar medial calcaneal tuber R. No pain with lateral calc squeeze. No pain with STJ ROM or palpation of Achilles tendon/insertion, Abductor hallucis muscle belly, TA tendon/insertion R.      Skin:     General: Skin is warm and dry.      Findings: No erythema.      Comments: No open lesions, lacerations or wounds noted. Nails are normal to R 1-5 and L 1-5. Interdigital spaces clean, dry and intact b/l. No erythema noted to b/l foot. Skin texture normal. Pedal hair normal. No bruising or ecchymosis noted to R foot or ankle.      Neurological:      Mental Status: She is alert and oriented to person, place, and time.      Sensory: No sensory deficit.      Comments: Light touch, proprioception, and sharp/dull sensation are all intact bilaterally.     Psychiatric:         Behavior: Behavior normal.         Thought Content:  Thought content normal.         Judgment: Judgment normal.         Assessment:      Encounter Diagnoses   Name Primary?    Tendinitis of extensor tendon of right hand Yes    Right ankle pain, unspecified chronicity     Peroneal tendinitis, right        Plan:     Ailyn was seen today for follow-up.    Diagnoses and all orders for this visit:    Tendinitis of extensor tendon of right hand  -     Ambulatory referral/consult to Physical/Occupational Therapy; Future    Right ankle pain, unspecified chronicity  -     Ambulatory referral/consult to Physical/Occupational Therapy; Future    Peroneal tendinitis, right  -     Ambulatory referral/consult to Physical/Occupational Therapy; Future        I counseled the patient on her conditions, their implications and medical management.    Unable to take oral or topical NSAIDs due to allergy.     Continue OTC topical pain relievers (Salonpas, Aspercreme, Biofreeze etc).     The patient is advised to apply ice or cold packs intermittently as needed to relieve pain.    Continue ankle brace for 2-4 weeks and wean out when pain improves.     Will order PT for further assessment of R tendonitis. See referral.      Continue comfortable shoes with arch supports as directed.     RTC 3 weeks, sooner PRN **

## 2023-04-24 ENCOUNTER — PATIENT MESSAGE (OUTPATIENT)
Dept: PODIATRY | Facility: CLINIC | Age: 53
End: 2023-04-24
Payer: COMMERCIAL

## 2023-04-25 ENCOUNTER — TELEPHONE (OUTPATIENT)
Dept: PODIATRY | Facility: CLINIC | Age: 53
End: 2023-04-25
Payer: COMMERCIAL

## 2023-05-01 ENCOUNTER — PATIENT MESSAGE (OUTPATIENT)
Dept: PODIATRY | Facility: CLINIC | Age: 53
End: 2023-05-01
Payer: COMMERCIAL

## 2023-05-02 ENCOUNTER — TELEPHONE (OUTPATIENT)
Dept: PODIATRY | Facility: CLINIC | Age: 53
End: 2023-05-02
Payer: COMMERCIAL

## 2023-05-25 ENCOUNTER — OFFICE VISIT (OUTPATIENT)
Dept: PODIATRY | Facility: CLINIC | Age: 53
End: 2023-05-25
Payer: COMMERCIAL

## 2023-05-25 VITALS
SYSTOLIC BLOOD PRESSURE: 132 MMHG | HEIGHT: 67 IN | DIASTOLIC BLOOD PRESSURE: 80 MMHG | WEIGHT: 246.94 LBS | HEART RATE: 56 BPM | BODY MASS INDEX: 38.76 KG/M2

## 2023-05-25 DIAGNOSIS — G89.29 CHRONIC PAIN OF RIGHT ANKLE: Primary | ICD-10-CM

## 2023-05-25 DIAGNOSIS — M25.571 CHRONIC PAIN OF RIGHT ANKLE: Primary | ICD-10-CM

## 2023-05-25 DIAGNOSIS — M25.571 PAIN IN JOINT INVOLVING RIGHT ANKLE AND FOOT: ICD-10-CM

## 2023-05-25 PROCEDURE — 3008F PR BODY MASS INDEX (BMI) DOCUMENTED: ICD-10-PCS | Mod: CPTII,S$GLB,, | Performed by: PODIATRIST

## 2023-05-25 PROCEDURE — 3008F BODY MASS INDEX DOCD: CPT | Mod: CPTII,S$GLB,, | Performed by: PODIATRIST

## 2023-05-25 PROCEDURE — 3044F PR MOST RECENT HEMOGLOBIN A1C LEVEL <7.0%: ICD-10-PCS | Mod: CPTII,S$GLB,, | Performed by: PODIATRIST

## 2023-05-25 PROCEDURE — 1160F PR REVIEW ALL MEDS BY PRESCRIBER/CLIN PHARMACIST DOCUMENTED: ICD-10-PCS | Mod: CPTII,S$GLB,, | Performed by: PODIATRIST

## 2023-05-25 PROCEDURE — 3075F PR MOST RECENT SYSTOLIC BLOOD PRESS GE 130-139MM HG: ICD-10-PCS | Mod: CPTII,S$GLB,, | Performed by: PODIATRIST

## 2023-05-25 PROCEDURE — 1160F RVW MEDS BY RX/DR IN RCRD: CPT | Mod: CPTII,S$GLB,, | Performed by: PODIATRIST

## 2023-05-25 PROCEDURE — 1159F PR MEDICATION LIST DOCUMENTED IN MEDICAL RECORD: ICD-10-PCS | Mod: CPTII,S$GLB,, | Performed by: PODIATRIST

## 2023-05-25 PROCEDURE — 3044F HG A1C LEVEL LT 7.0%: CPT | Mod: CPTII,S$GLB,, | Performed by: PODIATRIST

## 2023-05-25 PROCEDURE — 99999 PR PBB SHADOW E&M-EST. PATIENT-LVL III: ICD-10-PCS | Mod: PBBFAC,,, | Performed by: PODIATRIST

## 2023-05-25 PROCEDURE — 1159F MED LIST DOCD IN RCRD: CPT | Mod: CPTII,S$GLB,, | Performed by: PODIATRIST

## 2023-05-25 PROCEDURE — 99213 PR OFFICE/OUTPT VISIT, EST, LEVL III, 20-29 MIN: ICD-10-PCS | Mod: S$GLB,,, | Performed by: PODIATRIST

## 2023-05-25 PROCEDURE — 99213 OFFICE O/P EST LOW 20 MIN: CPT | Mod: S$GLB,,, | Performed by: PODIATRIST

## 2023-05-25 PROCEDURE — 3079F PR MOST RECENT DIASTOLIC BLOOD PRESSURE 80-89 MM HG: ICD-10-PCS | Mod: CPTII,S$GLB,, | Performed by: PODIATRIST

## 2023-05-25 PROCEDURE — 3075F SYST BP GE 130 - 139MM HG: CPT | Mod: CPTII,S$GLB,, | Performed by: PODIATRIST

## 2023-05-25 PROCEDURE — 99999 PR PBB SHADOW E&M-EST. PATIENT-LVL III: CPT | Mod: PBBFAC,,, | Performed by: PODIATRIST

## 2023-05-25 PROCEDURE — 3079F DIAST BP 80-89 MM HG: CPT | Mod: CPTII,S$GLB,, | Performed by: PODIATRIST

## 2023-05-25 NOTE — PROGRESS NOTES
"Subjective:     Patient ID: Ailyn Yanes is a 52 y.o. female.    Chief Complaint: No chief complaint on file.    Ailyn is a 52 y.o. female who presents to the podiatry clinic  with complaint of  right ankle pain. Onset of the symptoms was  2 months ago . Precipitating event: none known but has had numerous ankle sprains in the past most recently 6 months ago Current symptoms include: ability to bear weight, but with some pain, stiffness, and worsening symptoms after a period of activity. Aggravating factors: any weight bearing and walking. Symptoms have progressed to a point and plateaued. Patient has had prior foot problems. Evaluation to date: none. Treatment to date: rest and OTC Salonpas which only helps a little. Patients rates pain 2/10 on pain scale usually but sometimes reaches 8/10 based on activity. Uses OTC insoles in shoes to help with hx of plantar fasciitis.     04/19/2023: follow up for R ankle pain and tendinitis. Has been using ankle brace which has helped a lot. Using OTC topical analgesics which help. Pain improved but not 100%. Still feels some stiffness and tightness. Here for further recommendations.     05/25/2023: follow up for R ankle pain. Started PT which is helping her strength but not her pain. Pain is still about 6/10. Inquiring about additional treatment options. Using ankle brace which helps.     Vitals:    05/25/23 1432   BP: 132/80   Pulse: (!) 56   Weight: 112 kg (246 lb 14.6 oz)   Height: 5' 7" (1.702 m)   PainSc:   6   PainLoc: Foot         Review of Systems   Constitutional: Negative for chills and fever.   Cardiovascular:  Negative for chest pain, claudication and leg swelling.   Respiratory:  Negative for cough and shortness of breath.    Skin:  Negative for dry skin and nail changes.   Musculoskeletal:  Positive for myalgias and stiffness.        Ankle and foot pain R   Gastrointestinal:  Negative for nausea and vomiting.   Neurological:  Negative for numbness and " paresthesias.   Psychiatric/Behavioral:  Negative for altered mental status.       Objective:     Physical Exam  Vitals reviewed.   Constitutional:       Appearance: She is well-developed.   HENT:      Head: Normocephalic.   Cardiovascular:      Pulses:           Dorsalis pedis pulses are 2+ on the right side and 2+ on the left side.        Posterior tibial pulses are 2+ on the right side and 2+ on the left side.      Comments: DP and PT pulses are 2/4 bilaterally.        Pulmonary:      Effort: No respiratory distress.   Musculoskeletal:      Right lower leg: No edema.      Left lower leg: No edema.      Comments: Mild to moderate pain with palpation of lateral R ankle along ATFL and CFL. Minimal to no pain with palpation of PB/PL tendon course posterior to fibula or  onto the insertion of the PB tendon on lateral plantar base of 5th metatarsal or  along PL tendon along plantar arch course or insertion. No further edema to R lateral ankle. Mild pain anterior ankle along peroneus tertius tendon and EDL tendons anterior to ankle only.     MMT 5/5 in DF/PF/Inv/Ev with pain on DF/Ev resistance along PB/PT and EDL tendon course.     Hypermobility noted to 1st ray b/l with near complete collapse of medial longitudinal arch b/l with loading.     Equinus contracture noted to b/l ankles with knee straight and slightly improved with knee bent.     Mild pain with palpation of plantar medial calcaneal tuber R. No pain with lateral calc squeeze. No pain with STJ ROM or palpation of Achilles tendon/insertion, Abductor hallucis muscle belly, TA tendon/insertion R.      Skin:     General: Skin is warm and dry.      Findings: No erythema.      Comments: No open lesions, lacerations or wounds noted. Nails are normal to R 1-5 and L 1-5. Interdigital spaces clean, dry and intact b/l. No erythema noted to b/l foot. Skin texture normal. Pedal hair normal. No bruising or ecchymosis noted to R foot or ankle.      Neurological:      Mental  Status: She is alert and oriented to person, place, and time.      Sensory: No sensory deficit.      Comments: Light touch, proprioception, and sharp/dull sensation are all intact bilaterally.     Psychiatric:         Behavior: Behavior normal.         Thought Content: Thought content normal.         Judgment: Judgment normal.         Assessment:      Encounter Diagnoses   Name Primary?    Chronic pain of right ankle Yes    Pain in joint involving right ankle and foot          Plan:     Diagnoses and all orders for this visit:    Chronic pain of right ankle    Pain in joint involving right ankle and foot  -     MRI Ankle Without Contrast Right; Future          I counseled the patient on her conditions, their implications and medical management.    Unable to take oral or topical NSAIDs due to allergy.     Continue OTC topical pain relievers (Salonpas, Aspercreme, Biofreeze etc).     The patient is advised to apply ice or cold packs intermittently as needed to relieve pain.    Continue ankle brace for 2-4 weeks and wean out when pain improves.     Continue PT as scheduled/ordered.     MRI ordered of R ankle to assess soft tissues for injury and/or damage given equivocal xray results. Rule out soft tissue abnormality.      Continue comfortable shoes with arch supports as directed.     RTC within 1 week after MRI completed, sooner PRN **

## 2023-06-07 ENCOUNTER — HOSPITAL ENCOUNTER (OUTPATIENT)
Dept: RADIOLOGY | Facility: HOSPITAL | Age: 53
Discharge: HOME | End: 2023-06-07
Attending: PODIATRIST
Payer: COMMERCIAL

## 2023-06-07 DIAGNOSIS — M25.571 PAIN IN JOINT INVOLVING RIGHT ANKLE AND FOOT: ICD-10-CM

## 2023-06-07 PROCEDURE — 73721 MRI JNT OF LWR EXTRE W/O DYE: CPT | Mod: TC,RT

## 2023-06-07 PROCEDURE — 73721 MRI JNT OF LWR EXTRE W/O DYE: CPT | Mod: 26,RT,, | Performed by: RADIOLOGY

## 2023-06-07 PROCEDURE — 73721 MRI ANKLE WITHOUT CONTRAST RIGHT: ICD-10-PCS | Mod: 26,RT,, | Performed by: RADIOLOGY

## 2023-06-13 ENCOUNTER — TELEPHONE (OUTPATIENT)
Dept: PODIATRY | Facility: CLINIC | Age: 53
End: 2023-06-13
Payer: COMMERCIAL

## 2023-06-13 NOTE — TELEPHONE ENCOUNTER
Please call her and let her know that her MRI showed mild tendinitis and plantar fasciitis. No tears or dangerous condition was noted on the MRI. Continue with current plan and PT and follow up as scheduled. Thanks. Spoke with patient voice understanding to conversation. Call ended.

## 2023-06-14 ENCOUNTER — TELEPHONE (OUTPATIENT)
Dept: PODIATRY | Facility: CLINIC | Age: 53
End: 2023-06-14
Payer: COMMERCIAL

## 2023-06-14 NOTE — TELEPHONE ENCOUNTER
Called pt to get her rescheduled. She want to see another provider that is available. Dr. Barrera 6/28/23 is scheduled and confirmed.

## 2023-09-07 NOTE — PROGRESS NOTES
Subjective:      Patient ID: Ailyn Yanes is a 53 y.o. female.    Chief Complaint:  No chief complaint on file.    History of Present Illness  Ms. Yanes is a 53 y.o. with hashimoto's hypothyroidism and diabetes. Previous patient of Dr Nielson. Last visit in Aug 2022.This is her first visit with me.    Denies changes in medical history since her last visit.     With regards to hypothyroidism,     Dx around 2010- symptoms included fatigue and difficulty sleeping   FH of thyroid disease or cancer: denies      Cholecystectomy in 2020    Current regimen: Levothyroxine 150 mcg daily and 1.5 pills on Sunday.   Takes thyroid medication on an empty stomach and waits 30-45 minutes before eating drinking or taking other medications  Missed doses: denies     current symptoms:     [x] weight gain -5 pounds in the past 6 months [x] Fatigue  [x] Constipation -change in bowels          [x] Hair loss  [] Brittle nails  [] Mental fog [] Chest pain, palpations, or sob   [x]  Heat/cold intolerance  [] Denies complaints    She has been eating out more since starting a new job.     Does report hoarseness. She is thinking related to allergies  Denies difficulty breathing or swallowing.    Denies selenium   Denies Biotin usage      Lab Results   Component Value Date    TSH 1.043 02/09/2023    FREET4 1.19 11/11/2019      Latest Reference Range & Units 11/11/19 10:56   Thyroperoxidase Antibodies <6.0 IU/mL 408.7 (H)   (H): Data is abnormally high    With regards to diabetes,    Dx with diabetes around 2010 with PCP outside of [facility] when A1c was >7%  I do not have access to these records  Mother and father with Type 2 diabetes  Up to date on eye exam    Was taking metformin 500 mg twice daily but we reduced to daily in Aug 2022 due to SE. She is still having diarrhea that occurs regularly.     Cutting back on portions.   Exercise: some; tries to stay active     Denies personal history of pancreatitis or FH of thyroid cancer.  Brother  "with neuroendocrine tumor-unknown type. States he had tumor on his pancreas that was removed and he passed from blood clot.     Lab Results   Component Value Date    HGBA1C 6.1 (H) 02/09/2023     Father has history of MI      Review of Systems   Constitutional:  Positive for fatigue and unexpected weight change.   Eyes:  Negative for visual disturbance.   Endocrine: Negative for polydipsia, polyphagia and polyuria.     Objective:   Physical Exam  Constitutional:       Appearance: She is obese.   Neck:      Thyroid: No thyromegaly.   Pulmonary:      Effort: Pulmonary effort is normal.   Neurological:      Mental Status: She is alert.     Diabetes Foot Exam:  Feet no cuts or scratches  Shoes appropriate  sensation intact to vibration and monofilament    Vitals:    09/13/23 0956   BP: 126/82   Pulse: (!) 56   SpO2: 97%   Weight: 109.6 kg (241 lb 10 oz)   Height: 5' 7" (1.702 m)         BP Readings from Last 3 Encounters:   09/13/23 126/82   05/25/23 132/80   04/19/23 131/70     Wt Readings from Last 1 Encounters:   09/13/23 0956 109.6 kg (241 lb 10 oz)     Body mass index is 37.84 kg/m².    Lab Review:   Lab Results   Component Value Date    HGBA1C 6.1 (H) 02/09/2023     Lab Results   Component Value Date    CHOL 126 12/12/2017    HDL 39 (L) 12/12/2017    LDLCALC 73.6 12/12/2017    TRIG 67 12/12/2017    CHOLHDL 31.0 12/12/2017     Lab Results   Component Value Date     08/05/2022    K 4.7 08/05/2022     08/05/2022    CO2 28 08/05/2022    GLU 85 08/05/2022    BUN 15 08/05/2022    CREATININE 0.8 08/05/2022    CALCIUM 10.4 08/05/2022    PROT 7.8 08/05/2022    ALBUMIN 4.1 08/05/2022    BILITOT 0.6 08/05/2022    ALKPHOS 100 08/05/2022    AST 18 08/05/2022    ALT 29 08/05/2022    ANIONGAP 9 08/05/2022    ESTGFRAFRICA >60 11/19/2020    EGFRNONAA >60 11/19/2020    TSH 1.043 02/09/2023     Assessment and Plan     1. Controlled type 2 diabetes mellitus without complication, without long-term current use of insulin  " Hemoglobin A1C    Comprehensive Metabolic Panel    empagliflozin (JARDIANCE) 10 mg tablet    Microalbumin/Creatinine Ratio, Urine    Comprehensive Metabolic Panel    CBC Auto Differential    Lipid Panel      2. Hypothyroidism, unspecified type  TSH    US Soft Tissue Head Neck Thyroid      3. Menopausal symptoms          Controlled type 2 diabetes mellitus without complication, without long-term current use of insulin  Dx with diabetes around 2010 with PCP outside of [facility] when A1c was >7%  I do not have access to these records  A1c at 6.1%    She is having intolerable side effects to metformin- diarrhea.     Brother with neuroendocrine tumor-unknown type   Discussed I am worried about family history of MEN- 1 -multiple endocrine neoplasia  Discussed I would like to add Mounjaro but will avoid with FH of neuroendocrine tumor   She will review old med records from her brother and let me know.  Medication Changes  Stop Metformin due to intolerable side effects   Add Jardiance 10 mg daily    Discussed MOA and SE. Discussed MOA is to make her urinate out sugar. Discussed side effects include UTIs/yeast infections, Hawk's gangrene, DKA, and orthostatic hypotension. Encouraged to call for intolerable side effects, instructed to drink at least 4-16 oz bottles of water daily, use good bathroom hygiene, eat at least 30 grams of carbs per each meal, call for persistent nausea, vomiting, diarrhea, and change positions carefully. Hold medication if you are ill. Hold medication 3 day prior to surgery.      Hypothyroidism   Check TSH    Check thyroid US considering her hoarseness. Discussed may be GI related or allergies.    Continue levothyroxine 150 mcg 7.5 tabs weekly    Menopausal symptoms  Takes clonidine 0.1 mg at bedtime   Has helped with night sweats.   To continue     Follow up in about 6 months (around 3/13/2024).  Labs today

## 2023-09-12 NOTE — ASSESSMENT & PLAN NOTE
Dx with diabetes around 2010 with PCP outside of [facility] when A1c was >7%  I do not have access to these records  A1c at 6.1%    She is having intolerable side effects to metformin- diarrhea.     Brother with neuroendocrine tumor-unknown type   Discussed I am worried about family history of MEN- 1 -multiple endocrine neoplasia  Discussed I would like to add Mounjaro but will avoid with FH of neuroendocrine tumor   She will review old med records from her brother and let me know.  Medication Changes  Stop Metformin due to intolerable side effects   Add Jardiance 10 mg daily    Discussed MOA and SE. Discussed MOA is to make her urinate out sugar. Discussed side effects include UTIs/yeast infections, Hawk's gangrene, DKA, and orthostatic hypotension. Encouraged to call for intolerable side effects, instructed to drink at least 4-16 oz bottles of water daily, use good bathroom hygiene, eat at least 30 grams of carbs per each meal, call for persistent nausea, vomiting, diarrhea, and change positions carefully. Hold medication if you are ill. Hold medication 3 day prior to surgery.

## 2023-09-12 NOTE — ASSESSMENT & PLAN NOTE
Check TSH    Check thyroid US considering her hoarseness. Discussed may be GI related or allergies.    Continue levothyroxine 150 mcg 7.5 tabs weekly

## 2023-09-13 ENCOUNTER — HOSPITAL ENCOUNTER (OUTPATIENT)
Dept: RADIOLOGY | Facility: HOSPITAL | Age: 53
Discharge: HOME | End: 2023-09-13
Attending: NURSE PRACTITIONER
Payer: COMMERCIAL

## 2023-09-13 ENCOUNTER — OFFICE VISIT (OUTPATIENT)
Dept: ENDOCRINOLOGY | Facility: CLINIC | Age: 53
End: 2023-09-13
Payer: COMMERCIAL

## 2023-09-13 VITALS
HEIGHT: 67 IN | SYSTOLIC BLOOD PRESSURE: 126 MMHG | DIASTOLIC BLOOD PRESSURE: 82 MMHG | OXYGEN SATURATION: 97 % | BODY MASS INDEX: 37.92 KG/M2 | WEIGHT: 241.63 LBS | HEART RATE: 56 BPM

## 2023-09-13 DIAGNOSIS — N95.1 MENOPAUSAL SYMPTOMS: ICD-10-CM

## 2023-09-13 DIAGNOSIS — E03.9 HYPOTHYROIDISM, UNSPECIFIED TYPE: ICD-10-CM

## 2023-09-13 DIAGNOSIS — E11.9 CONTROLLED TYPE 2 DIABETES MELLITUS WITHOUT COMPLICATION, WITHOUT LONG-TERM CURRENT USE OF INSULIN: ICD-10-CM

## 2023-09-13 PROCEDURE — 76536 US EXAM OF HEAD AND NECK: CPT | Mod: 26,,, | Performed by: RADIOLOGY

## 2023-09-13 PROCEDURE — 99999 PR PBB SHADOW E&M-EST. PATIENT-LVL IV: ICD-10-PCS | Mod: PBBFAC,,, | Performed by: NURSE PRACTITIONER

## 2023-09-13 PROCEDURE — 3008F BODY MASS INDEX DOCD: CPT | Mod: CPTII,S$GLB,, | Performed by: NURSE PRACTITIONER

## 2023-09-13 PROCEDURE — 99214 PR OFFICE/OUTPT VISIT, EST, LEVL IV, 30-39 MIN: ICD-10-PCS | Mod: S$GLB,,, | Performed by: NURSE PRACTITIONER

## 2023-09-13 PROCEDURE — 1159F MED LIST DOCD IN RCRD: CPT | Mod: CPTII,S$GLB,, | Performed by: NURSE PRACTITIONER

## 2023-09-13 PROCEDURE — 3074F SYST BP LT 130 MM HG: CPT | Mod: CPTII,S$GLB,, | Performed by: NURSE PRACTITIONER

## 2023-09-13 PROCEDURE — 3044F HG A1C LEVEL LT 7.0%: CPT | Mod: CPTII,S$GLB,, | Performed by: NURSE PRACTITIONER

## 2023-09-13 PROCEDURE — 76536 US SOFT TISSUE HEAD NECK THYROID: ICD-10-PCS | Mod: 26,,, | Performed by: RADIOLOGY

## 2023-09-13 PROCEDURE — 99214 OFFICE O/P EST MOD 30 MIN: CPT | Mod: S$GLB,,, | Performed by: NURSE PRACTITIONER

## 2023-09-13 PROCEDURE — 3079F PR MOST RECENT DIASTOLIC BLOOD PRESSURE 80-89 MM HG: ICD-10-PCS | Mod: CPTII,S$GLB,, | Performed by: NURSE PRACTITIONER

## 2023-09-13 PROCEDURE — 3008F PR BODY MASS INDEX (BMI) DOCUMENTED: ICD-10-PCS | Mod: CPTII,S$GLB,, | Performed by: NURSE PRACTITIONER

## 2023-09-13 PROCEDURE — 3079F DIAST BP 80-89 MM HG: CPT | Mod: CPTII,S$GLB,, | Performed by: NURSE PRACTITIONER

## 2023-09-13 PROCEDURE — 1160F RVW MEDS BY RX/DR IN RCRD: CPT | Mod: CPTII,S$GLB,, | Performed by: NURSE PRACTITIONER

## 2023-09-13 PROCEDURE — 3044F PR MOST RECENT HEMOGLOBIN A1C LEVEL <7.0%: ICD-10-PCS | Mod: CPTII,S$GLB,, | Performed by: NURSE PRACTITIONER

## 2023-09-13 PROCEDURE — 3074F PR MOST RECENT SYSTOLIC BLOOD PRESSURE < 130 MM HG: ICD-10-PCS | Mod: CPTII,S$GLB,, | Performed by: NURSE PRACTITIONER

## 2023-09-13 PROCEDURE — 1159F PR MEDICATION LIST DOCUMENTED IN MEDICAL RECORD: ICD-10-PCS | Mod: CPTII,S$GLB,, | Performed by: NURSE PRACTITIONER

## 2023-09-13 PROCEDURE — 1160F PR REVIEW ALL MEDS BY PRESCRIBER/CLIN PHARMACIST DOCUMENTED: ICD-10-PCS | Mod: CPTII,S$GLB,, | Performed by: NURSE PRACTITIONER

## 2023-09-13 PROCEDURE — 76536 US EXAM OF HEAD AND NECK: CPT | Mod: TC

## 2023-09-13 PROCEDURE — 99999 PR PBB SHADOW E&M-EST. PATIENT-LVL IV: CPT | Mod: PBBFAC,,, | Performed by: NURSE PRACTITIONER

## 2023-09-13 NOTE — PATIENT INSTRUCTIONS
[phone] fax    Thyroid Hormone    Check TSH    Check thyroid US considering her hoarseness. Discussed may be GI related or allergies.    Continue levothyroxine 150 mcg 7.5 tabs weekly    Diabetes    Dx with diabetes around 2010 with PCP outside of [facility] when A1c was >7%  I do not have access to these records  She is having intolerable side effects to metformin- diarrhea.     Brother with neuroendocrine tumor-unknown type   Discussed I am worried about family history of MEN- 1 -multiple endocrine neoplasia  Discussed I would like to add Mounjaro but will avoid with FH of neuroendocrine tumor     Medication Changes  Stop Metformin due to intolerable side effects   Add Jardiance 10 mg daily    Discussed MOA and SE. Discussed MOA is to make her urinate out sugar. Discussed side effects include UTIs/yeast infections, Hawk's gangrene, DKA, and orthostatic hypotension. Encouraged to call for intolerable side effects, instructed to drink at least 4-16 oz bottles of water daily, use good bathroom hygiene, eat at least 30 grams of carbs per each meal, call for persistent nausea, vomiting, diarrhea, and change positions carefully. Hold medication if you are ill. Hold medication 3 day prior to surgery.    Snacks can be an important part of a balanced, healthy meal plan. They allow you to eat more frequently, feeling full and satisfied throughout the day. Also, they allow you to spread carbohydrates evenly, which may stabilize blood sugars.  Plus, snacks are enjoyable!     The amount of carbohydrate needed at snacks varies. Generally, about 15-30 grams of carbohydrate per snack is recommended.  Below you will find some tasty treats.       0-5 gm carb  Crystal Light  Vitamin Water Zero  Herbal tea, unsweetened  2 tsp peanut butter on celery  1./2 cup sugar-free jell-o  1 sugar-free popsicle  ¼ cup blueberries  8oz Blue Martha unsweetened almond milk  5 baby carrots & celery sticks, cucumbers, bell peppers dipped in  ¼ cup salsa, 2Tbsp light ranch dressing or 2Tbsp plain Greek yogurt  10 Goldfish crackers  ½ oz low-fat cheese or string cheese  1 closed handful of nuts, unsalted  1 Tbsp of sunflower seeds, unsalted  1 cup Smart Pop popcorn  1 whole grain brown rice cake        15 gm carb  1 small piece of fruit or ½ banana or 1/2 cup lite canned fruit  3 marcelo cracker squares  3 cups Smart Pop popcorn, top spray butter, Bell lite salt or cinnamon and Truvia  5 Vanilla Wafers  ½ cup low fat, no added sugar ice cream or frozen yogurt (Blue bell, Blue Bunny, Weight Watchers, Skinny Cow)  ½ turkey, ham, or chicken sandwich  ½ c fruit with ½ c Cottage cheese  4-6 unsalted wheat crackers with 1 oz low fat cheese or 1 tbsp peanut butter   30-45 goldfish crackers (depending on flavor)   7-8 Orthodox mini brown rice cakes (caramel, apple cinnamon, chocolate)   12 Orthodox mini brown rice cakes (cheddar, bbq, ranch)   1/3 cup hummus dip with raw veg  1/2 whole wheat anthony, 1Tbsp hummus  Mini Pizza (1/2 whole wheat English muffin, low-fat  cheese, tomato sauce)  100 calorie snack pack (Oreo, Chips Ahoy, Ritz Mix, Baked Cheetos)  4-6 oz. light or Greek Style yogurt (Chobani, Yoplait, Okios, Stoneyfield)  ½ cup sugar-free pudding    6 in. wheat tortilla or anthony oven toasted chips (topped with spray butter flavoring, cinnamon, Truvia OR spray butter, garlic powder, chili powder)   18 BBQ Popchips (available at [employer], [employer], Fresh Market)

## 2023-09-14 ENCOUNTER — LAB VISIT (OUTPATIENT)
Dept: LAB | Facility: HOSPITAL | Age: 53
End: 2023-09-14
Attending: NURSE PRACTITIONER
Payer: COMMERCIAL

## 2023-09-14 DIAGNOSIS — E11.9 CONTROLLED TYPE 2 DIABETES MELLITUS WITHOUT COMPLICATION, WITHOUT LONG-TERM CURRENT USE OF INSULIN: ICD-10-CM

## 2023-09-14 DIAGNOSIS — E03.9 HYPOTHYROIDISM, UNSPECIFIED TYPE: ICD-10-CM

## 2023-09-14 LAB
ALBUMIN SERPL BCP-MCNC: 4.3 G/DL (ref 3.5–5.2)
ALBUMIN SERPL BCP-MCNC: 4.3 G/DL (ref 3.5–5.2)
ALBUMIN/CREAT UR: 29.9 UG/MG (ref 0–30)
ALP SERPL-CCNC: 99 U/L (ref 55–135)
ALP SERPL-CCNC: 99 U/L (ref 55–135)
ALT SERPL W/O P-5'-P-CCNC: 25 U/L (ref 10–44)
ALT SERPL W/O P-5'-P-CCNC: 25 U/L (ref 10–44)
ANION GAP SERPL CALC-SCNC: 11 MMOL/L (ref 8–16)
ANION GAP SERPL CALC-SCNC: 11 MMOL/L (ref 8–16)
AST SERPL-CCNC: 18 U/L (ref 10–40)
AST SERPL-CCNC: 18 U/L (ref 10–40)
BASOPHILS # BLD AUTO: 0.06 K/UL (ref 0–0.2)
BASOPHILS NFR BLD: 0.7 % (ref 0–1.9)
BILIRUB SERPL-MCNC: 0.7 MG/DL (ref 0.1–1)
BILIRUB SERPL-MCNC: 0.7 MG/DL (ref 0.1–1)
BUN SERPL-MCNC: 14 MG/DL (ref 6–20)
BUN SERPL-MCNC: 14 MG/DL (ref 6–20)
CALCIUM SERPL-MCNC: 9.6 MG/DL (ref 8.7–10.5)
CALCIUM SERPL-MCNC: 9.6 MG/DL (ref 8.7–10.5)
CHLORIDE SERPL-SCNC: 103 MMOL/L (ref 95–110)
CHLORIDE SERPL-SCNC: 103 MMOL/L (ref 95–110)
CHOLEST SERPL-MCNC: 146 MG/DL (ref 120–199)
CHOLEST/HDLC SERPL: 2.6 {RATIO} (ref 2–5)
CO2 SERPL-SCNC: 28 MMOL/L (ref 23–29)
CO2 SERPL-SCNC: 28 MMOL/L (ref 23–29)
CREAT SERPL-MCNC: 0.8 MG/DL (ref 0.5–1.4)
CREAT SERPL-MCNC: 0.8 MG/DL (ref 0.5–1.4)
CREAT UR-MCNC: 130.5 MG/DL (ref 15–325)
DIFFERENTIAL METHOD: ABNORMAL
EOSINOPHIL # BLD AUTO: 0.3 K/UL (ref 0–0.5)
EOSINOPHIL NFR BLD: 3.2 % (ref 0–8)
ERYTHROCYTE [DISTWIDTH] IN BLOOD BY AUTOMATED COUNT: 13.2 % (ref 11.5–14.5)
EST. GFR  (NO RACE VARIABLE): >60 ML/MIN/1.73 M^2
EST. GFR  (NO RACE VARIABLE): >60 ML/MIN/1.73 M^2
ESTIMATED AVG GLUCOSE: 120 MG/DL (ref 68–131)
GLUCOSE SERPL-MCNC: 116 MG/DL (ref 70–110)
GLUCOSE SERPL-MCNC: 116 MG/DL (ref 70–110)
HBA1C MFR BLD: 5.8 % (ref 4–5.6)
HCT VFR BLD AUTO: 41.8 % (ref 37–48.5)
HDLC SERPL-MCNC: 56 MG/DL (ref 40–75)
HDLC SERPL: 38.4 % (ref 20–50)
HGB BLD-MCNC: 13.8 G/DL (ref 12–16)
IMM GRANULOCYTES # BLD AUTO: 0.05 K/UL (ref 0–0.04)
IMM GRANULOCYTES NFR BLD AUTO: 0.6 % (ref 0–0.5)
LDLC SERPL CALC-MCNC: 76.6 MG/DL (ref 63–159)
LYMPHOCYTES # BLD AUTO: 2 K/UL (ref 1–4.8)
LYMPHOCYTES NFR BLD: 24.3 % (ref 18–48)
MCH RBC QN AUTO: 28.9 PG (ref 27–31)
MCHC RBC AUTO-ENTMCNC: 33 G/DL (ref 32–36)
MCV RBC AUTO: 88 FL (ref 82–98)
MICROALBUMIN UR DL<=1MG/L-MCNC: 39 UG/ML
MONOCYTES # BLD AUTO: 0.4 K/UL (ref 0.3–1)
MONOCYTES NFR BLD: 5.3 % (ref 4–15)
NEUTROPHILS # BLD AUTO: 5.3 K/UL (ref 1.8–7.7)
NEUTROPHILS NFR BLD: 65.9 % (ref 38–73)
NONHDLC SERPL-MCNC: 90 MG/DL
NRBC BLD-RTO: 0 /100 WBC
PLATELET # BLD AUTO: 245 K/UL (ref 150–450)
PMV BLD AUTO: 9.7 FL (ref 9.2–12.9)
POTASSIUM SERPL-SCNC: 4 MMOL/L (ref 3.5–5.1)
POTASSIUM SERPL-SCNC: 4 MMOL/L (ref 3.5–5.1)
PROT SERPL-MCNC: 8.2 G/DL (ref 6–8.4)
PROT SERPL-MCNC: 8.2 G/DL (ref 6–8.4)
RBC # BLD AUTO: 4.77 M/UL (ref 4–5.4)
SODIUM SERPL-SCNC: 142 MMOL/L (ref 136–145)
SODIUM SERPL-SCNC: 142 MMOL/L (ref 136–145)
TRIGL SERPL-MCNC: 67 MG/DL (ref 30–150)
TSH SERPL DL<=0.005 MIU/L-ACNC: 0.67 UIU/ML (ref 0.4–4)
WBC # BLD AUTO: 8.1 K/UL (ref 3.9–12.7)

## 2023-09-14 PROCEDURE — 85025 COMPLETE CBC W/AUTO DIFF WBC: CPT | Performed by: NURSE PRACTITIONER

## 2023-09-14 PROCEDURE — 36415 COLL VENOUS BLD VENIPUNCTURE: CPT | Performed by: NURSE PRACTITIONER

## 2023-09-14 PROCEDURE — 82570 ASSAY OF URINE CREATININE: CPT | Performed by: NURSE PRACTITIONER

## 2023-09-14 PROCEDURE — 84443 ASSAY THYROID STIM HORMONE: CPT | Performed by: NURSE PRACTITIONER

## 2023-09-14 PROCEDURE — 83036 HEMOGLOBIN GLYCOSYLATED A1C: CPT | Performed by: NURSE PRACTITIONER

## 2023-09-14 PROCEDURE — 80061 LIPID PANEL: CPT | Performed by: NURSE PRACTITIONER

## 2023-09-14 PROCEDURE — 80053 COMPREHEN METABOLIC PANEL: CPT | Performed by: NURSE PRACTITIONER

## 2023-09-18 ENCOUNTER — PATIENT MESSAGE (OUTPATIENT)
Dept: ENDOCRINOLOGY | Facility: CLINIC | Age: 53
End: 2023-09-18
Payer: COMMERCIAL

## 2023-09-18 DIAGNOSIS — E11.9 CONTROLLED TYPE 2 DIABETES MELLITUS WITHOUT COMPLICATION, WITHOUT LONG-TERM CURRENT USE OF INSULIN: Primary | ICD-10-CM

## 2023-09-18 RX ORDER — FLUCONAZOLE 150 MG/1
150 TABLET ORAL DAILY
Qty: 1 TABLET | Refills: 0 | Status: SHIPPED | OUTPATIENT
Start: 2023-09-18 | End: 2023-09-19

## 2023-10-06 ENCOUNTER — OFFICE VISIT (OUTPATIENT)
Dept: CARDIOLOGY | Facility: CLINIC | Age: 53
End: 2023-10-06
Payer: COMMERCIAL

## 2023-10-06 VITALS
WEIGHT: 242.06 LBS | BODY MASS INDEX: 37.99 KG/M2 | HEIGHT: 67 IN | SYSTOLIC BLOOD PRESSURE: 139 MMHG | OXYGEN SATURATION: 98 % | HEART RATE: 64 BPM | DIASTOLIC BLOOD PRESSURE: 64 MMHG

## 2023-10-06 DIAGNOSIS — E11.9 CONTROLLED TYPE 2 DIABETES MELLITUS WITHOUT COMPLICATION, WITHOUT LONG-TERM CURRENT USE OF INSULIN: ICD-10-CM

## 2023-10-06 DIAGNOSIS — I10 ESSENTIAL HYPERTENSION: Primary | ICD-10-CM

## 2023-10-06 DIAGNOSIS — E03.9 HYPOTHYROIDISM, UNSPECIFIED TYPE: ICD-10-CM

## 2023-10-06 PROCEDURE — 3078F PR MOST RECENT DIASTOLIC BLOOD PRESSURE < 80 MM HG: ICD-10-PCS | Mod: CPTII,S$GLB,, | Performed by: INTERNAL MEDICINE

## 2023-10-06 PROCEDURE — 3061F NEG MICROALBUMINURIA REV: CPT | Mod: CPTII,S$GLB,, | Performed by: INTERNAL MEDICINE

## 2023-10-06 PROCEDURE — 99203 PR OFFICE/OUTPT VISIT, NEW, LEVL III, 30-44 MIN: ICD-10-PCS | Mod: S$GLB,,, | Performed by: INTERNAL MEDICINE

## 2023-10-06 PROCEDURE — 3075F SYST BP GE 130 - 139MM HG: CPT | Mod: CPTII,S$GLB,, | Performed by: INTERNAL MEDICINE

## 2023-10-06 PROCEDURE — 1159F MED LIST DOCD IN RCRD: CPT | Mod: CPTII,S$GLB,, | Performed by: INTERNAL MEDICINE

## 2023-10-06 PROCEDURE — 3008F BODY MASS INDEX DOCD: CPT | Mod: CPTII,S$GLB,, | Performed by: INTERNAL MEDICINE

## 2023-10-06 PROCEDURE — 3008F PR BODY MASS INDEX (BMI) DOCUMENTED: ICD-10-PCS | Mod: CPTII,S$GLB,, | Performed by: INTERNAL MEDICINE

## 2023-10-06 PROCEDURE — 99999 PR PBB SHADOW E&M-EST. PATIENT-LVL IV: CPT | Mod: PBBFAC,,, | Performed by: INTERNAL MEDICINE

## 2023-10-06 PROCEDURE — 99999 PR PBB SHADOW E&M-EST. PATIENT-LVL IV: ICD-10-PCS | Mod: PBBFAC,,, | Performed by: INTERNAL MEDICINE

## 2023-10-06 PROCEDURE — 3078F DIAST BP <80 MM HG: CPT | Mod: CPTII,S$GLB,, | Performed by: INTERNAL MEDICINE

## 2023-10-06 PROCEDURE — 3066F PR DOCUMENTATION OF TREATMENT FOR NEPHROPATHY: ICD-10-PCS | Mod: CPTII,S$GLB,, | Performed by: INTERNAL MEDICINE

## 2023-10-06 PROCEDURE — 3061F PR NEG MICROALBUMINURIA RESULT DOCUMENTED/REVIEW: ICD-10-PCS | Mod: CPTII,S$GLB,, | Performed by: INTERNAL MEDICINE

## 2023-10-06 PROCEDURE — 3044F PR MOST RECENT HEMOGLOBIN A1C LEVEL <7.0%: ICD-10-PCS | Mod: CPTII,S$GLB,, | Performed by: INTERNAL MEDICINE

## 2023-10-06 PROCEDURE — 3066F NEPHROPATHY DOC TX: CPT | Mod: CPTII,S$GLB,, | Performed by: INTERNAL MEDICINE

## 2023-10-06 PROCEDURE — 99203 OFFICE O/P NEW LOW 30 MIN: CPT | Mod: S$GLB,,, | Performed by: INTERNAL MEDICINE

## 2023-10-06 PROCEDURE — 1159F PR MEDICATION LIST DOCUMENTED IN MEDICAL RECORD: ICD-10-PCS | Mod: CPTII,S$GLB,, | Performed by: INTERNAL MEDICINE

## 2023-10-06 PROCEDURE — 3044F HG A1C LEVEL LT 7.0%: CPT | Mod: CPTII,S$GLB,, | Performed by: INTERNAL MEDICINE

## 2023-10-06 PROCEDURE — 3075F PR MOST RECENT SYSTOLIC BLOOD PRESS GE 130-139MM HG: ICD-10-PCS | Mod: CPTII,S$GLB,, | Performed by: INTERNAL MEDICINE

## 2023-10-20 NOTE — PROGRESS NOTES
Subjective:   Chief Complaint: family history of cardiac issues  Last Clinic Visit: New Patient    History of Present Illness: Ailyn Yanes is a 53 y.o. lady with diabetes, hypertension, morbid obesity, who presents to establish cardiology care, and assess cardiovascular risk factors.  She denies any recent med changes for hypertension, did recently started diabetes medication and had a yeast infection as result.  Had side effects to metformin in the past too., has a remote A1c over 7% but most recent A1cs have been in the 6% range.  Blood pressure at home between 115 to 130s systolic over 60-80 diastolic.  Walks on a regular basis denies any chest pain or change in exercise tolerance.  No lightheadedness, no cramping, no orthopnea.  Most recent LDL 76.  No renal dysfunction.  Reports allergies to ACE inhibitors as well as aspirin.    Dx:  Diabetes mellitus   Hypertension   Morbid obesity   Hashimoto's hypothyroidism     Medications:  Outpatient Encounter Medications as of 10/6/2023   Medication Sig Dispense Refill    atenolol (TENORMIN) 50 MG tablet Take 50 mg by mouth once daily.       cloNIDine (CATAPRES) 0.1 MG tablet Take 1 tablet by mouth at bedtime 90 tablet 4    DEXILANT 60 mg capsule Take 1 capsule by mouth once daily.      hydroCHLOROthiazide (HYDRODIURIL) 25 MG tablet Take 25 mg by mouth once daily.      levothyroxine (SYNTHROID) 150 MCG tablet TAKE 1 TABLET BY MOUTH ON MONDAY - SATURDAY, THEN ON SUNDAYS TAKE 2 TABLETS 103 tablet 0    metFORMIN (GLUCOPHAGE) 500 MG tablet Take 500 mg by mouth 2 (two) times daily with meals.      montelukast (SINGULAIR) 10 mg tablet Take 10 mg by mouth every evening.       sertraline (ZOLOFT) 100 MG tablet Take 100 mg by mouth every evening.      fexofenadine (ALLEGRA) 180 MG tablet Take 180 mg by mouth once daily.       flu vac ts 2016-17,4 yr up,/PF (FLU VAC TS 2014-15,36MOS+,,PF,) 45 mcg (15 mcg x 3)/0.5 mL Afluria 5341-2071(PF) 45 mcg (15 mcg x 3)/0.5 mL  "intramuscular syringe      [DISCONTINUED] empagliflozin (JARDIANCE) 10 mg tablet Take 1 tablet (10 mg total) by mouth once daily. 30 tablet 4     No facility-administered encounter medications on file as of 10/6/2023.     Social History:  Ailyn reports that she quit smoking about 30 years ago. Her smoking use included cigarettes. She has never used smokeless tobacco. She reports that she does not drink alcohol.    Objective:   /64   Pulse 64   Ht 5' 7" (1.702 m)   Wt 109.8 kg (242 lb 1 oz)   LMP  (LMP Unknown)   SpO2 98%   BMI 37.91 kg/m²     Physical Exam   HENT:   Head: Normocephalic and atraumatic.   Mouth/Throat: Mucous membranes are moist.   Cardiovascular: Normal rate, regular rhythm and normal pulses. Exam reveals no gallop and no friction rub.   No murmur heard.  Pulmonary/Chest: Effort normal and breath sounds normal. No stridor. No respiratory distress. She has no rhonchi. She has no rales. She exhibits no tenderness.   Abdominal: Normal appearance. She exhibits no distension.   Musculoskeletal:      Right lower leg: No edema.      Left lower leg: No edema.   Neurological: She is alert.   Skin: Skin is warm. Capillary refill takes less than 2 seconds.      EKG:  My independent visualization of most recent EKG is normal sinus rhythm    Lipids:  Recent Labs   Lab 09/14/23  0733   LDL Cholesterol 76.6   HDL 56      Renal:  Recent Labs   Lab 09/14/23  0733   Creatinine 0.8  0.8   Potassium 4.0  4.0   CO2 28  28   BUN 14  14     Liver:  Recent Labs   Lab 09/14/23  0733   AST 18  18   ALT 25  25     Assessment:     1. Essential hypertension    2. Controlled type 2 diabetes mellitus without complication, without long-term current use of insulin    3. Hypothyroidism, unspecified type      Plan:   1. Essential hypertension  Blood pressure well controlled at home, upper limit of normal today, would continue current medical therapy without change, atenolol 50 mg, clonidine p.r.n. for sleep not for " blood pressure, hydrochlorothiazide 25.    2. Controlled type 2 diabetes mellitus without complication, without long-term current use of insulin  Somewhat of an interesting situation cholesterol very well-controlled without statin therapy LDL 76, HDL 56 as well as good ratio.  Also noted to have very well-controlled diabetes A1c now below 6%.  Thus given this overall combination of issues, ASCVD risk score 3%, will hold off on statin therapy at this time.    3. Hypothyroidism, unspecified type      Follow up in p.r.n.      Fab Fitzgerald MD [facility]

## 2023-11-24 ENCOUNTER — HOSPITAL ENCOUNTER (OUTPATIENT)
Dept: RADIOLOGY | Facility: HOSPITAL | Age: 53
Discharge: HOME | End: 2023-11-24
Attending: SPECIALIST
Payer: COMMERCIAL

## 2023-11-24 VITALS — WEIGHT: 242 LBS | BODY MASS INDEX: 37.98 KG/M2 | HEIGHT: 67 IN

## 2023-11-24 DIAGNOSIS — Z12.31 ENCOUNTER FOR SCREENING MAMMOGRAM FOR MALIGNANT NEOPLASM OF BREAST: ICD-10-CM

## 2023-11-24 PROCEDURE — 77063 MAMMO DIGITAL SCREENING BILAT WITH TOMO: ICD-10-PCS | Mod: 26,,, | Performed by: RADIOLOGY

## 2023-11-24 PROCEDURE — 77067 SCR MAMMO BI INCL CAD: CPT | Mod: TC

## 2023-11-24 PROCEDURE — 77067 MAMMO DIGITAL SCREENING BILAT WITH TOMO: ICD-10-PCS | Mod: 26,,, | Performed by: RADIOLOGY

## 2023-11-24 PROCEDURE — 77063 BREAST TOMOSYNTHESIS BI: CPT | Mod: 26,,, | Performed by: RADIOLOGY

## 2023-11-24 PROCEDURE — 77067 SCR MAMMO BI INCL CAD: CPT | Mod: 26,,, | Performed by: RADIOLOGY

## 2024-04-18 ENCOUNTER — PATIENT MESSAGE (OUTPATIENT)
Dept: ENDOCRINOLOGY | Facility: CLINIC | Age: 54
End: 2024-04-18
Payer: COMMERCIAL

## 2024-04-19 DIAGNOSIS — E11.9 CONTROLLED TYPE 2 DIABETES MELLITUS WITHOUT COMPLICATION, WITHOUT LONG-TERM CURRENT USE OF INSULIN: Primary | ICD-10-CM

## 2024-04-19 DIAGNOSIS — E03.9 HYPOTHYROIDISM, UNSPECIFIED TYPE: ICD-10-CM

## 2024-04-22 ENCOUNTER — LAB VISIT (OUTPATIENT)
Dept: LAB | Facility: HOSPITAL | Age: 54
End: 2024-04-22
Attending: NURSE PRACTITIONER
Payer: COMMERCIAL

## 2024-04-22 ENCOUNTER — PATIENT MESSAGE (OUTPATIENT)
Dept: ENDOCRINOLOGY | Facility: CLINIC | Age: 54
End: 2024-04-22
Payer: COMMERCIAL

## 2024-04-22 DIAGNOSIS — E03.9 HYPOTHYROIDISM, UNSPECIFIED TYPE: ICD-10-CM

## 2024-04-22 DIAGNOSIS — E11.9 CONTROLLED TYPE 2 DIABETES MELLITUS WITHOUT COMPLICATION, WITHOUT LONG-TERM CURRENT USE OF INSULIN: ICD-10-CM

## 2024-04-22 LAB
ALBUMIN SERPL BCP-MCNC: 4.1 G/DL (ref 3.5–5.2)
ALP SERPL-CCNC: 87 U/L (ref 55–135)
ALT SERPL W/O P-5'-P-CCNC: 26 U/L (ref 10–44)
ANION GAP SERPL CALC-SCNC: 9 MMOL/L (ref 8–16)
AST SERPL-CCNC: 20 U/L (ref 10–40)
BILIRUB SERPL-MCNC: 0.5 MG/DL (ref 0.1–1)
BUN SERPL-MCNC: 13 MG/DL (ref 6–20)
CALCIUM SERPL-MCNC: 10.1 MG/DL (ref 8.7–10.5)
CHLORIDE SERPL-SCNC: 103 MMOL/L (ref 95–110)
CO2 SERPL-SCNC: 30 MMOL/L (ref 23–29)
CREAT SERPL-MCNC: 1 MG/DL (ref 0.5–1.4)
EST. GFR  (NO RACE VARIABLE): >60 ML/MIN/1.73 M^2
ESTIMATED AVG GLUCOSE: 126 MG/DL (ref 68–131)
GLUCOSE SERPL-MCNC: 144 MG/DL (ref 70–110)
HBA1C MFR BLD: 6 % (ref 4–5.6)
POTASSIUM SERPL-SCNC: 4.2 MMOL/L (ref 3.5–5.1)
PROT SERPL-MCNC: 7.7 G/DL (ref 6–8.4)
SODIUM SERPL-SCNC: 142 MMOL/L (ref 136–145)
TSH SERPL DL<=0.005 MIU/L-ACNC: 1.36 UIU/ML (ref 0.4–4)

## 2024-04-22 PROCEDURE — 84443 ASSAY THYROID STIM HORMONE: CPT | Performed by: NURSE PRACTITIONER

## 2024-04-22 PROCEDURE — 36415 COLL VENOUS BLD VENIPUNCTURE: CPT | Performed by: NURSE PRACTITIONER

## 2024-04-22 PROCEDURE — 80053 COMPREHEN METABOLIC PANEL: CPT | Performed by: NURSE PRACTITIONER

## 2024-04-22 PROCEDURE — 83036 HEMOGLOBIN GLYCOSYLATED A1C: CPT | Performed by: NURSE PRACTITIONER

## 2024-04-22 NOTE — PROGRESS NOTES
Subjective:      Patient ID: Ailyn Yanes is a 53 y.o. female.    Chief Complaint:  Hypothyroidism, Follow-up, Diabetes, Hypertension, and Hashimoto's Thyroiditis    History of Present Illness  Ms. Yanes is a 53 y.o. with hashimoto's hypothyroidism and diabetes. Previous patient of Dr Nielson. Last visit in Sept 2023 with me.    Denies changes in medical history since her last visit.    Right PET procedure December 2023.    With regards to hypothyroidism,     Dx around 2010- symptoms included fatigue and difficulty sleeping   FH of thyroid disease or cancer: denies      Cholecystectomy in 2020    Current regimen: Levothyroxine 150 mcg daily and 1.5 pills on Sunday.   Takes thyroid medication on an empty stomach and waits 30-45 minutes before eating drinking or taking other medications    Missed doses: denies     current symptoms:     [x] weight gain -5 pounds since Sep 2023 [x] Fatigue  [] Constipation          [] Hair loss  [] Brittle nails  [] Mental fog [] Chest pain, palpations, or sob   [x]  Heat intolerance (+ menopause)  [] Denies complaints    She has been eating out, but just started [employer].    Does report hoarseness. She is thinking related to allergies.  Denies difficulty breathing or swallowing.    Denies selenium   Denies Biotin usage      Lab Results   Component Value Date    TSH 1.365 04/22/2024    FREET4 1.19 11/11/2019      Latest Reference Range & Units 11/11/19 10:56   Thyroperoxidase Antibodies <6.0 IU/mL 408.7 (H)   (H): Data is abnormally high    With regards to diabetes,    Dx with diabetes around 2010 with PCP outside of [facility] when A1c was >7%  I do not have access to these records  Mother and father with Type 2 diabetes  Eye exam: 06/2024  Foot exam: 09/2023    Was taking metformin 500 mg twice daily but we reduced to daily in Aug 2022 due to diarrhea.    Current medication:  Metformin 500 mg once daily    Previous:  We tried Jardiance and she had yeast infection.     Checks BG  twice a week  Fastin-125    Denies hypoglycemia symptoms and episodes    Diet: Hello[employer]  Eating 3 meals  Breakfast: cereal and banana, coffee (splenda and zero sugar creamer)  Lunch: sandwich with chips  Dinner: [employer]  Snack: nuts, popcorn  Drink: water, diet tea  Exercise: little- likes to bike, walking     Denies personal history of pancreatitis or FH of thyroid cancer.  Brother with neuroendocrine tumor-unknown type. States he had tumor on his pancreas that was removed and he passed from blood clot.     Lab Results   Component Value Date    HGBA1C 6.0 (H) 2024     Father has history of MI      FIB-4 Calculation: 0.88 at 2024  3:12 PM     FIB-4 below 1.30 is considered as low-risk for advanced fibrosis  FIB-4 over 2.67 is considered as high-risk for advanced fibrosis  FIB-4 values between 1.30 and 2.67 are considered as intermediate-risk of advanced fibrosis for ages 36-64.     For ages > 64 the cut-off for low-risk goes to < 2.  This is a screening tool and clinical judgement should be used in the interpretation of these results.    With regards to dyslipidemia,     She is not on medication      Latest Reference Range & Units 23 07:33   Cholesterol Total 120 - 199 mg/dL 146   HDL 40 - 75 mg/dL 56   HDL/Cholesterol Ratio 20.0 - 50.0 % 38.4   Non-HDL Cholesterol mg/dL 90   Total Cholesterol/HDL Ratio 2.0 - 5.0  2.6   Triglycerides 30 - 150 mg/dL 67   LDL Cholesterol 63.0 - 159.0 mg/dL 76.6     Review of Systems   Constitutional:  Positive for fatigue and unexpected weight change (weight gain).   Eyes:  Negative for visual disturbance.   Endocrine: Negative for polydipsia, polyphagia and polyuria.     Objective:   Physical Exam  Constitutional:       Appearance: She is obese.   Neck:      Thyroid: No thyromegaly.   Pulmonary:      Effort: Pulmonary effort is normal.   Neurological:      Mental Status: She is alert.     Diabetes Foot Exam: 2023  Feet no cuts or scratches  Shoes  "appropriate  DM foot exam done last visit in Sept 2023.    Vitals:    04/25/24 1320   BP: 134/86   SpO2: 96%   Weight: 113.9 kg (251 lb 1.7 oz)   Height: 5' 7" (1.702 m)     BP Readings from Last 3 Encounters:   04/25/24 134/86   10/06/23 139/64   09/13/23 126/82     Wt Readings from Last 1 Encounters:   04/25/24 1320 113.9 kg (251 lb 1.7 oz)     Body mass index is 39.33 kg/m².    Lab Review:   Lab Results   Component Value Date    HGBA1C 6.0 (H) 04/22/2024     Lab Results   Component Value Date    CHOL 146 09/14/2023    HDL 56 09/14/2023    LDLCALC 76.6 09/14/2023    TRIG 67 09/14/2023    CHOLHDL 38.4 09/14/2023     Lab Results   Component Value Date     04/22/2024    K 4.2 04/22/2024     04/22/2024    CO2 30 (H) 04/22/2024     (H) 04/22/2024    BUN 13 04/22/2024    CREATININE 1.0 04/22/2024    CALCIUM 10.1 04/22/2024    PROT 7.7 04/22/2024    ALBUMIN 4.1 04/22/2024    BILITOT 0.5 04/22/2024    ALKPHOS 87 04/22/2024    AST 20 04/22/2024    ALT 26 04/22/2024    ANIONGAP 9 04/22/2024    ESTGFRAFRICA >60 11/19/2020    EGFRNONAA >60 11/19/2020    TSH 1.365 04/22/2024     Assessment and Plan     1. Controlled type 2 diabetes mellitus without complication, without long-term current use of insulin  Comprehensive Metabolic Panel    Hemoglobin A1C    Microalbumin/Creatinine Ratio, Urine    Lipid Panel    atorvastatin (LIPITOR) 20 MG tablet      2. Hypothyroidism, unspecified type  TSH      3. Dyslipidemia  atorvastatin (LIPITOR) 20 MG tablet      4. Essential hypertension          Controlled type 2 diabetes mellitus without complication, without long-term current use of insulin  Dx with diabetes around 2010 with PCP outside of [facility] when A1c was >7%  I do not have access to these records  A1C at goal!  Labs reviewed- repeat prior to next follow-up    Medication changes:  Continue metformin 500 mg once day    She is having intolerable side effects to metformin- diarrhea.   Avoid SGLT2i due to yeast " infection.    Brother with neuroendocrine tumor-unknown type   Discussed I am worried about family history of MEN- 2 -multiple endocrine neoplasia  Discussed I would like to add Mounjaro but will avoid with FH of neuroendocrine tumor     Hypothyroidism  TSH is at goal  Repeat prior to follow-up  Continue Levothyroxine 150 mcg Monday to Saturday and 1.5 on Sunday    Dyslipidemia  Not on statin  Start atorvastatin 20 mg daily   LDL goal <70    Essential hypertension  States at goal at home  Discussed goal BP for diabetic is <130/80  Not on ACEI or ARB  Check UMCR next time     Visit today included increased complexity associated with the care of episodic problems diabetes, hypothyroidism, dyslipidemia addressed and managing longitudinal care of the patient due to serious managed problems diabetes, hypothyroidism, dyslipidemia    Follow up in about 6 months (around 10/25/2024) for labs on RTC.  Labs today

## 2024-04-23 NOTE — ASSESSMENT & PLAN NOTE
Dx with diabetes around 2010 with PCP outside of [facility] when A1c was >7%  I do not have access to these records  A1C at goal!  Labs reviewed- repeat prior to next follow-up    Medication changes:  Continue metformin 500 mg once day    She is having intolerable side effects to metformin- diarrhea.   Avoid SGLT2i due to yeast infection.    Brother with neuroendocrine tumor-unknown type   Discussed I am worried about family history of MEN- 2 -multiple endocrine neoplasia  Discussed I would like to add Mounjaro but will avoid with FH of neuroendocrine tumor

## 2024-04-23 NOTE — ASSESSMENT & PLAN NOTE
TSH is at goal  Repeat prior to follow-up  Continue Levothyroxine 150 mcg Monday to Saturday and 1.5 on Sunday

## 2024-04-25 ENCOUNTER — OFFICE VISIT (OUTPATIENT)
Dept: ENDOCRINOLOGY | Facility: CLINIC | Age: 54
End: 2024-04-25
Payer: COMMERCIAL

## 2024-04-25 VITALS
DIASTOLIC BLOOD PRESSURE: 86 MMHG | HEIGHT: 67 IN | BODY MASS INDEX: 39.42 KG/M2 | WEIGHT: 251.13 LBS | OXYGEN SATURATION: 96 % | SYSTOLIC BLOOD PRESSURE: 134 MMHG

## 2024-04-25 DIAGNOSIS — E78.5 DYSLIPIDEMIA: ICD-10-CM

## 2024-04-25 DIAGNOSIS — E03.9 HYPOTHYROIDISM, UNSPECIFIED TYPE: ICD-10-CM

## 2024-04-25 DIAGNOSIS — I10 ESSENTIAL HYPERTENSION: ICD-10-CM

## 2024-04-25 DIAGNOSIS — E11.9 CONTROLLED TYPE 2 DIABETES MELLITUS WITHOUT COMPLICATION, WITHOUT LONG-TERM CURRENT USE OF INSULIN: Primary | ICD-10-CM

## 2024-04-25 PROCEDURE — 99214 OFFICE O/P EST MOD 30 MIN: CPT | Mod: S$GLB,,, | Performed by: NURSE PRACTITIONER

## 2024-04-25 PROCEDURE — 1159F MED LIST DOCD IN RCRD: CPT | Mod: CPTII,S$GLB,, | Performed by: NURSE PRACTITIONER

## 2024-04-25 PROCEDURE — 3044F HG A1C LEVEL LT 7.0%: CPT | Mod: CPTII,S$GLB,, | Performed by: NURSE PRACTITIONER

## 2024-04-25 PROCEDURE — 3079F DIAST BP 80-89 MM HG: CPT | Mod: CPTII,S$GLB,, | Performed by: NURSE PRACTITIONER

## 2024-04-25 PROCEDURE — 1160F RVW MEDS BY RX/DR IN RCRD: CPT | Mod: CPTII,S$GLB,, | Performed by: NURSE PRACTITIONER

## 2024-04-25 PROCEDURE — 3008F BODY MASS INDEX DOCD: CPT | Mod: CPTII,S$GLB,, | Performed by: NURSE PRACTITIONER

## 2024-04-25 PROCEDURE — 99999 PR PBB SHADOW E&M-EST. PATIENT-LVL IV: CPT | Mod: PBBFAC,,, | Performed by: NURSE PRACTITIONER

## 2024-04-25 PROCEDURE — 3075F SYST BP GE 130 - 139MM HG: CPT | Mod: CPTII,S$GLB,, | Performed by: NURSE PRACTITIONER

## 2024-04-25 PROCEDURE — G2211 COMPLEX E/M VISIT ADD ON: HCPCS | Mod: S$GLB,,, | Performed by: NURSE PRACTITIONER

## 2024-04-25 RX ORDER — ATORVASTATIN CALCIUM 20 MG/1
20 TABLET, FILM COATED ORAL DAILY
Qty: 90 TABLET | Refills: 3 | Status: SHIPPED | OUTPATIENT
Start: 2024-04-25 | End: 2025-04-25

## 2024-04-25 NOTE — PATIENT INSTRUCTIONS
Diabetes  A1C at goal!  Labs reviewed- repeat prior to next follow-up    Medication changes:  Continue metformin 500 mg once day    Hypoglycemia (Low Blood Glucose)  Low blood glucose occurs with the following conditions.  Not Enough Food or Missing Meal.  Too Much Insulin  More Exercise Than Usual    It is best to use something that you can always carry with you. Choose a food that is all carbohydrate because it will be very fast acting. Try not to choose chocolate or other high fat foods. They will not work fast enough and you may also end up over-treating your lows. The suggested amount of carbohydrate to start with is 15 grams. Don't keep eating until you feel better. Eat the required amount and stop. The feelings will pass and you will be grateful that you did not overdo it.    Some people with diabetes know when their blood glucose is low and some do not. If you are a person who is not aware of hypoglycemia, it is important to test your blood glucose more often. Everyone with diabetes should test before driving a car to assure safety on the road. Blood glucose should be above 100 mg/dl before driving and at bedtime.     The symptoms of low blood sugars are usually heart racing, sweating, anxiety, feeling hungry, tremor, weakness or most severely loss of consciousness.     Rule of 15:   Test your blood sugar  If glucose is between 50-70 mg/dL then ingest 15 grams of fast-acting carbs  If glucose is less than 50 mg/dL then ingest 30 grams of fast-acting carbs  Ingest 15 grams of fast-acting carbohydrate - such as:   3-4 glucose tablets  4 oz juice  ½ can regular soda pop  15 skittles or mini jelly beans   Re-check your blood sugar in 15 minutes. If its less than 70mg/dl, repeat steps 2 and 3.  If your next meal is more than 1 hour away, eat an additional 15 grams of carbohydrate and 1 ounce of protein (examples include crackers with cheese or one-half of a sandwich with peanut butter). It is important not to  eat too much because this can raise your blood sugar above the target level.    After your blood sugar has normalized, think about why you went low. If you notice a pattern of low blood sugars, contact your Diabetes Team. We may need to adjust your medication.     Hypothyroidism:  TSH is at goal  Repeat prior to follow-up  Continue Levothyroxine 150 mcg Monday to Saturday and 1.5 on Sunday

## 2024-04-25 NOTE — ASSESSMENT & PLAN NOTE
States at goal at home  Discussed goal BP for diabetic is <130/80  Not on ACEI or ARB  Check [facility] next time

## 2024-04-30 ENCOUNTER — PATIENT MESSAGE (OUTPATIENT)
Dept: ENDOCRINOLOGY | Facility: CLINIC | Age: 54
End: 2024-04-30
Payer: COMMERCIAL

## 2024-08-26 ENCOUNTER — TELEPHONE (OUTPATIENT)
Dept: ENDOCRINOLOGY | Facility: CLINIC | Age: 54
End: 2024-08-26
Payer: COMMERCIAL

## 2024-11-08 ENCOUNTER — PATIENT MESSAGE (OUTPATIENT)
Dept: ENDOCRINOLOGY | Facility: CLINIC | Age: 54
End: 2024-11-08
Payer: COMMERCIAL

## 2024-11-11 ENCOUNTER — LAB VISIT (OUTPATIENT)
Dept: LAB | Facility: HOSPITAL | Age: 54
End: 2024-11-11
Attending: STUDENT IN AN ORGANIZED HEALTH CARE EDUCATION/TRAINING PROGRAM
Payer: COMMERCIAL

## 2024-11-11 DIAGNOSIS — E03.9 HYPOTHYROIDISM, UNSPECIFIED TYPE: ICD-10-CM

## 2024-11-11 DIAGNOSIS — E11.9 CONTROLLED TYPE 2 DIABETES MELLITUS WITHOUT COMPLICATION, WITHOUT LONG-TERM CURRENT USE OF INSULIN: ICD-10-CM

## 2024-11-11 LAB
ALBUMIN SERPL BCP-MCNC: 3.8 G/DL (ref 3.5–5.2)
ALBUMIN/CREAT UR: 39.4 UG/MG (ref 0–30)
ALP SERPL-CCNC: 101 U/L (ref 40–150)
ALT SERPL W/O P-5'-P-CCNC: 26 U/L (ref 10–44)
ANION GAP SERPL CALC-SCNC: 10 MMOL/L (ref 8–16)
AST SERPL-CCNC: 19 U/L (ref 10–40)
BILIRUB SERPL-MCNC: 0.5 MG/DL (ref 0.1–1)
BUN SERPL-MCNC: 13 MG/DL (ref 6–20)
CALCIUM SERPL-MCNC: 9.5 MG/DL (ref 8.7–10.5)
CHLORIDE SERPL-SCNC: 105 MMOL/L (ref 95–110)
CHOLEST SERPL-MCNC: 104 MG/DL (ref 120–199)
CHOLEST/HDLC SERPL: 2.1 {RATIO} (ref 2–5)
CO2 SERPL-SCNC: 27 MMOL/L (ref 23–29)
CREAT SERPL-MCNC: 0.8 MG/DL (ref 0.5–1.4)
CREAT UR-MCNC: 152.1 MG/DL (ref 15–325)
EST. GFR  (NO RACE VARIABLE): >60 ML/MIN/1.73 M^2
ESTIMATED AVG GLUCOSE: 140 MG/DL (ref 68–131)
GLUCOSE SERPL-MCNC: 129 MG/DL (ref 70–110)
HBA1C MFR BLD: 6.5 % (ref 4–5.6)
HDLC SERPL-MCNC: 50 MG/DL (ref 40–75)
HDLC SERPL: 48.1 % (ref 20–50)
LDLC SERPL CALC-MCNC: 43.2 MG/DL (ref 63–159)
MICROALBUMIN UR DL<=1MG/L-MCNC: 60 UG/ML
NONHDLC SERPL-MCNC: 54 MG/DL
POTASSIUM SERPL-SCNC: 3.8 MMOL/L (ref 3.5–5.1)
PROT SERPL-MCNC: 7.5 G/DL (ref 6–8.4)
SODIUM SERPL-SCNC: 142 MMOL/L (ref 136–145)
TRIGL SERPL-MCNC: 54 MG/DL (ref 30–150)
TSH SERPL DL<=0.005 MIU/L-ACNC: 1 UIU/ML (ref 0.4–4)

## 2024-11-11 PROCEDURE — 36415 COLL VENOUS BLD VENIPUNCTURE: CPT

## 2024-11-11 PROCEDURE — 82043 UR ALBUMIN QUANTITATIVE: CPT

## 2024-11-11 PROCEDURE — 80053 COMPREHEN METABOLIC PANEL: CPT

## 2024-11-11 PROCEDURE — 83036 HEMOGLOBIN GLYCOSYLATED A1C: CPT

## 2024-11-11 PROCEDURE — 84443 ASSAY THYROID STIM HORMONE: CPT

## 2024-11-11 PROCEDURE — 80061 LIPID PANEL: CPT

## 2024-11-12 ENCOUNTER — OFFICE VISIT (OUTPATIENT)
Dept: ENDOCRINOLOGY | Facility: CLINIC | Age: 54
End: 2024-11-12
Payer: COMMERCIAL

## 2024-11-12 VITALS
OXYGEN SATURATION: 99 % | BODY MASS INDEX: 40.14 KG/M2 | DIASTOLIC BLOOD PRESSURE: 84 MMHG | SYSTOLIC BLOOD PRESSURE: 132 MMHG | WEIGHT: 255.75 LBS | HEART RATE: 74 BPM | HEIGHT: 67 IN

## 2024-11-12 DIAGNOSIS — E11.9 CONTROLLED TYPE 2 DIABETES MELLITUS WITHOUT COMPLICATION, WITHOUT LONG-TERM CURRENT USE OF INSULIN: Primary | ICD-10-CM

## 2024-11-12 DIAGNOSIS — E03.9 HYPOTHYROIDISM, UNSPECIFIED TYPE: ICD-10-CM

## 2024-11-12 DIAGNOSIS — I10 ESSENTIAL HYPERTENSION: ICD-10-CM

## 2024-11-12 PROCEDURE — 3066F NEPHROPATHY DOC TX: CPT | Mod: CPTII,S$GLB,, | Performed by: INTERNAL MEDICINE

## 2024-11-12 PROCEDURE — 99214 OFFICE O/P EST MOD 30 MIN: CPT | Mod: S$GLB,,, | Performed by: INTERNAL MEDICINE

## 2024-11-12 PROCEDURE — 3079F DIAST BP 80-89 MM HG: CPT | Mod: CPTII,S$GLB,, | Performed by: INTERNAL MEDICINE

## 2024-11-12 PROCEDURE — 1159F MED LIST DOCD IN RCRD: CPT | Mod: CPTII,S$GLB,, | Performed by: INTERNAL MEDICINE

## 2024-11-12 PROCEDURE — 3044F HG A1C LEVEL LT 7.0%: CPT | Mod: CPTII,S$GLB,, | Performed by: INTERNAL MEDICINE

## 2024-11-12 PROCEDURE — 1160F RVW MEDS BY RX/DR IN RCRD: CPT | Mod: CPTII,S$GLB,, | Performed by: INTERNAL MEDICINE

## 2024-11-12 PROCEDURE — 3008F BODY MASS INDEX DOCD: CPT | Mod: CPTII,S$GLB,, | Performed by: INTERNAL MEDICINE

## 2024-11-12 PROCEDURE — 3060F POS MICROALBUMINURIA REV: CPT | Mod: CPTII,S$GLB,, | Performed by: INTERNAL MEDICINE

## 2024-11-12 PROCEDURE — 99999 PR PBB SHADOW E&M-EST. PATIENT-LVL IV: CPT | Mod: PBBFAC,,, | Performed by: INTERNAL MEDICINE

## 2024-11-12 PROCEDURE — 3075F SYST BP GE 130 - 139MM HG: CPT | Mod: CPTII,S$GLB,, | Performed by: INTERNAL MEDICINE

## 2024-11-12 RX ORDER — DEXAMETHASONE 1 MG/1
1 TABLET ORAL ONCE
Qty: 1 TABLET | Refills: 0 | Status: SHIPPED | OUTPATIENT
Start: 2024-11-12 | End: 2024-11-12

## 2024-11-12 NOTE — PATIENT INSTRUCTIONS
Goal weight loss 5% body weight ( 10-12 lbs)  Weigh yourself daily    Simple plan: avoid anything made with flour or sugar.   Increase vegetables, lean proteins and limit carbohydrates.     Choose high fiber carbohydrates, that is a carbohydrate that has more than 3 - 5 grams of fiber per serving. Examples of starchy foods that are good for you are beans, squash.      Drink plenty of water and avoid drinks with sugar such as regular sodas.     No snacking     Nutrition plan:  1) lower carbohydrate plans   2) intermittent fasting   3) Mediterranean diet    Book:   How to lose weight for the last time, Dr. Audrey Rowe   The obesity code, Dr. Karlo Glaser     Labs and follow up in six months (MS. Villegasobeminh or myself)

## 2024-11-12 NOTE — PROGRESS NOTES
"ENDOCRINOLOGY INITIAL VISIT  11/12/2024    Reason for Visit:  referred by No ref. provider found for evaluation of T2dm    HPI:  Ailyn Yanes is a 54 y.o. female with hx of htn, t2dm  Reports joint pains.   Denies change in shoe size, ring size. Denies diaphoresis (used to have hot flushing with diaphoresis).   Denies striae   Multiple skin tags    T2DM  Diagnosed 15 years ago  Known complications: abnormal urine protein level X1    Current Diabetes Regimen:  Metformin 500 mg one tablet daily     Prior mediations tried:  Jardiance - yeast infection       Diet/Exercise:  Exercise - denies   Low carb and low fat diet     Recent Hgb A1C:  Lab Results   Component Value Date    HGBA1C 6.5 (H) 11/11/2024     Hypoglycemic Episodes: denies     Screening / DM Complications:    Nephropathy:  ACEi/ARB: Taking  Lab Results   Component Value Date    MICALBCREAT 39.4 (H) 11/11/2024       Last Lipid Panel:  Statin: Taking  Lab Results   Component Value Date    LDLCALC 43.2 (L) 11/11/2024     Neuropathy:denies n/b/t  Last foot exam : 09/13/2023  Last eye exam Most Recent Eye Exam Date: Not Found;  no laser surgery or DR    B12:  No results found for: "[id number]"      Review of patient's allergies indicates:   Allergen Reactions    Ace inhibitors Other (See Comments) and Anaphylaxis     cough    Amoxicillin Nausea And Vomiting    Ibuprofen Swelling    Meloxicam Swelling    Nitrofurantoin monohyd/m-cryst Nausea And Vomiting    Aspirin Hives and Rash    Cefuroxime axetil Hives and Rash    Clindamycin Rash    Minocycline Rash         Current Outpatient Medications:     atenolol (TENORMIN) 50 MG tablet, Take 50 mg by mouth once daily. , Disp: , Rfl:     atorvastatin (LIPITOR) 20 MG tablet, Take 1 tablet (20 mg total) by mouth once daily., Disp: 90 tablet, Rfl: 3    cloNIDine (CATAPRES) 0.1 MG tablet, Take 1 tablet by mouth at bedtime, Disp: 90 tablet, Rfl: 4    fexofenadine (ALLEGRA) 180 MG tablet, Take 180 mg by mouth once daily. , " "Disp: , Rfl:     flu vac ts 2016-17,4 yr up,/PF (FLU VAC TS 2014-15,36MOS+,,PF,) 45 mcg (15 mcg x 3)/0.5 mL, Afluria 5336-5005(PF) 45 mcg (15 mcg x 3)/0.5 mL intramuscular syringe, Disp: , Rfl:     hydroCHLOROthiazide (HYDRODIURIL) 25 MG tablet, Take 25 mg by mouth once daily., Disp: , Rfl:     levothyroxine (SYNTHROID) 150 MCG tablet, TAKE 1 TABLET BY MOUTH ON MONDAY - SATURDAY, THEN ON SUNDAYS TAKE 2 TABLETS, Disp: 103 tablet, Rfl: 0    metFORMIN (GLUCOPHAGE) 500 MG tablet, Take 500 mg by mouth 2 (two) times daily with meals., Disp: , Rfl:     montelukast (SINGULAIR) 10 mg tablet, Take 10 mg by mouth every evening. , Disp: , Rfl:     sertraline (ZOLOFT) 100 MG tablet, Take 100 mg by mouth every evening., Disp: , Rfl:     dexAMETHasone (DECADRON) 1 MG Tab, Take 1 tablet (1 mg total) by mouth once. Take at 11PM the night prior to your lab appointment. for 1 dose, Disp: 1 tablet, Rfl: 0      ROS: see HPI       PHYSICAL EXAM  /84 (BP Location: Right arm, Patient Position: Sitting)   Pulse 74   Ht 5' 7" (1.702 m)   Wt 116 kg (255 lb 11.7 oz)   LMP  (LMP Unknown)   SpO2 99%   BMI 40.05 kg/m²   Wt Readings from Last 3 Encounters:   11/12/24 116 kg (255 lb 11.7 oz)   04/25/24 113.9 kg (251 lb 1.7 oz)   11/24/23 109.8 kg (242 lb)       IMAGING STUDIES    ASSESSMENT/PLAN    Problem List Items Addressed This Visit          1 - High    Controlled type 2 diabetes mellitus without complication, without long-term current use of insulin - Primary     BMI 40, sedentary, + insulin resistance, family history of T2DM  Evaluate with: 1 mg DST     PLAN:  DST  Metformin 500 mg daily     Repeat urine albumin - discussed weight loss   Would benefit from ARB if second test in six months is still abnormal  Follows low salt diet, goal <120/80 mm Hg           Relevant Medications    dexAMETHasone (DECADRON) 1 MG Tab    Other Relevant Orders    Cortisol, 8AM    Hemoglobin A1C    Microalbumin/Creatinine Ratio, Urine       2     " Hypothyroidism     Reduce thyroid hormone replacement to 150 mcg 7 1/2 pills weekly   Repeat TSH in six months.             3     Essential hypertension     BP at home <130/80 mmHg   Follows low salt diet   Consider ARB  Cough with ACEI              RTC 6 months   Labs one week before visit     Verito Nielson MD

## 2024-11-12 NOTE — ASSESSMENT & PLAN NOTE
BMI 40, sedentary, + insulin resistance, family history of T2DM  Evaluate with: 1 mg DST     PLAN:  DST  Metformin 500 mg daily     Repeat urine albumin - discussed weight loss   Would benefit from ARB if second test in six months is still abnormal  Follows low salt diet, goal <120/80 mm Hg

## 2024-11-14 ENCOUNTER — PATIENT MESSAGE (OUTPATIENT)
Dept: ENDOCRINOLOGY | Facility: CLINIC | Age: 54
End: 2024-11-14
Payer: COMMERCIAL

## 2024-11-26 ENCOUNTER — PATIENT MESSAGE (OUTPATIENT)
Dept: ENDOCRINOLOGY | Facility: CLINIC | Age: 54
End: 2024-11-26
Payer: COMMERCIAL

## 2024-12-17 ENCOUNTER — HOSPITAL ENCOUNTER (OUTPATIENT)
Dept: RADIOLOGY | Facility: HOSPITAL | Age: 54
Discharge: HOME | End: 2024-12-17
Attending: SPECIALIST
Payer: COMMERCIAL

## 2024-12-17 VITALS — BODY MASS INDEX: 40.02 KG/M2 | HEIGHT: 67 IN | WEIGHT: 255 LBS

## 2024-12-17 DIAGNOSIS — Z12.31 ENCOUNTER FOR SCREENING MAMMOGRAM FOR MALIGNANT NEOPLASM OF BREAST: ICD-10-CM

## 2024-12-17 PROCEDURE — 77067 SCR MAMMO BI INCL CAD: CPT | Mod: 26,,, | Performed by: RADIOLOGY

## 2024-12-17 PROCEDURE — 77063 BREAST TOMOSYNTHESIS BI: CPT | Mod: TC

## 2024-12-17 PROCEDURE — 77063 BREAST TOMOSYNTHESIS BI: CPT | Mod: 26,,, | Performed by: RADIOLOGY

## 2025-05-07 ENCOUNTER — TELEPHONE (OUTPATIENT)
Dept: ORTHOPEDICS | Facility: CLINIC | Age: 55
End: 2025-05-07
Payer: COMMERCIAL

## 2025-05-07 DIAGNOSIS — M79.642 LEFT HAND PAIN: Primary | ICD-10-CM

## 2025-05-07 NOTE — TELEPHONE ENCOUNTER
Patient communication     Notified patient to stop at [facility] Location - 1st Floor [address] [facility], Please park in [place] [address] and use [place] elevators 30 minutes prior to your appointment time for X-ray. After your X-ray please proceed to 9th Floor [address] for Appointment on 5/9/2025 with Lucy Montgomery PA-C for x-rays.     Lucia Schmitt MA  [facility]  P: [phone]  F: [phone]

## 2025-05-09 ENCOUNTER — OFFICE VISIT (OUTPATIENT)
Dept: ORTHOPEDICS | Facility: CLINIC | Age: 55
End: 2025-05-09
Payer: COMMERCIAL

## 2025-05-09 ENCOUNTER — HOSPITAL ENCOUNTER (OUTPATIENT)
Dept: RADIOLOGY | Facility: OTHER | Age: 55
Discharge: HOME | End: 2025-05-09
Payer: COMMERCIAL

## 2025-05-09 DIAGNOSIS — G89.29 CHRONIC PAIN OF LEFT HAND: Primary | ICD-10-CM

## 2025-05-09 DIAGNOSIS — M18.12 PRIMARY OSTEOARTHRITIS OF FIRST CARPOMETACARPAL JOINT OF LEFT HAND: ICD-10-CM

## 2025-05-09 DIAGNOSIS — M65.4 DE QUERVAIN'S TENOSYNOVITIS, LEFT: ICD-10-CM

## 2025-05-09 DIAGNOSIS — M79.642 LEFT HAND PAIN: ICD-10-CM

## 2025-05-09 DIAGNOSIS — M79.642 CHRONIC PAIN OF LEFT HAND: Primary | ICD-10-CM

## 2025-05-09 PROCEDURE — 1159F MED LIST DOCD IN RCRD: CPT | Mod: CPTII,S$GLB,,

## 2025-05-09 PROCEDURE — 20600 DRAIN/INJ JOINT/BURSA W/O US: CPT | Mod: LT,S$GLB,,

## 2025-05-09 PROCEDURE — 73130 X-RAY EXAM OF HAND: CPT | Mod: TC,FY,LT

## 2025-05-09 PROCEDURE — 99999 PR PBB SHADOW E&M-EST. PATIENT-LVL III: CPT | Mod: PBBFAC,,,

## 2025-05-09 PROCEDURE — 99204 OFFICE O/P NEW MOD 45 MIN: CPT | Mod: 25,S$GLB,,

## 2025-05-09 PROCEDURE — 73130 X-RAY EXAM OF HAND: CPT | Mod: 26,LT,, | Performed by: RADIOLOGY

## 2025-05-09 RX ADMIN — METHYLPREDNISOLONE ACETATE 40 MG: 40 INJECTION, SUSPENSION INTRA-ARTICULAR; INTRALESIONAL; INTRAMUSCULAR; SOFT TISSUE at 10:05

## 2025-05-09 NOTE — PROGRESS NOTES
[facility]  History & Physical  Orthopedics    Subjective:      Chief Complaint: Pain of the Left Hand    Referring Provider: Cristina Becker MD     History of Present Illness:  Ailyn Yanes is a 54 y.o. right hand dominant female who presents to clinic today with pain at radial aspect of left hand, which started about a year ago and has progressively worsened. Pain is located at the base of the left thumb. It is a constant throbbing pain at the basilar thumb that is exacerbated by impact/hitting it and with certain uses. She reports significant pain and difficulty gripping and grasping objects, particularly when opening water bottles or performing tasks requiring thumb use. She endorses swelling at the base of the thumb that persistently fluctuates throughout the day, sometimes increasing with use but occasionally occurring without apparent cause.Patient also reports numbness in the thumb, especially when keeping it still for too long or when it becomes swollen. Otherwise she denies any regular numbness or tingling in the hands. She has tried using a thumb brace, which sometimes helps but can cause discomfort during sleep. For pain relief, she has been using Tylenol and topical treatments like Salonpas. She reports allergies to aspirin, ibuprofen, and other NSAIDs, including topical forms like Voltaren gel, which causes a rash and anaphylaxis.    She states her middle finger PIP joint is stiff and locks in extension, causing difficulty with bending it. This particularly occurs in the morning or after prolonged inactivity. She describes a popping or clicking sensation when she finally manages to bend it.    PREVIOUS TREATMENTS:  - She has tried wearing a thumb brace, which helps sometimes but can make it uncomfortable for sleeping  - Tylenol  - She has been using Salonpas patches, which provide some temporary relief  - She has tried Nervive roll-on for nerve pain    ALLERGIES:  NSAIDS    WORK STATUS:  - Works as a   - Job duties involve typing on the computer    Patient denies any prior history of trauma or surgeries to the wrist or hand.    The patient denies any fevers, chills, N/V, D/C and presents for evaluation.    Vitals:    25 1025   PainSc:   5   PainLoc: Hand     Review of Systems:  As per HPI otherwise noncontributory     Past Medical History:   Diagnosis Date    Allergy     Diabetes mellitus     Hashimoto thyroiditis, fibrous variant 2019    Hypertension     Kidney stone     Thyroid disease      Past Surgical History:   Procedure Laterality Date    ARTHROSCOPY OF KNEE Right 10/24/2019    Procedure: ARTHROSCOPY, KNEE, Lysis of Adhesions;  Surgeon: Hazel Wolfe MD;  Location: [facility] OR;  Service: Orthopedics;  Laterality: Right;  ARTHROSCOPY, KNEE, Lysis of Adhesions     SECTION      CHONDROPLASTY OF KNEE Right 10/24/2019    Procedure: CHONDROPLASTY, KNEE ARTHROSCOPIC;  Surgeon: Hazel Wolfe MD;  Location: [facility] OR;  Service: Orthopedics;  Laterality: Right;    KIDNEY STONE SURGERY Left     KNEE ARTHROSCOPY W/ MENISCECTOMY Right 10/24/2019    Procedure: ARTHROSCOPY, KNEE, WITH PARTIAL MEDIAL MENISCECTOMY;  Surgeon: Hazel Wolfe MD;  Location: [facility] OR;  Service: Orthopedics;  Laterality: Right;    LAPAROSCOPIC CHOLECYSTECTOMY WITH CHOLANGIOGRAPHY N/A 2020    Procedure: CHOLECYSTECTOMY, LAPAROSCOPIC, WITH CHOLANGIOGRAM;  Surgeon: Wayne Maciel MD;  Location: [facility] OR;  Service: General;  Laterality: N/A;    SYNOVECTOMY OF KNEE Right 10/24/2019    Procedure: PARTIAL SYNOVECTOMY/DEBRIDEMENT; PLICA  EXCISION KNEE;  Surgeon: Hazel Wolfe MD;  Location: [facility] OR;  Service: Orthopedics;  Laterality: Right;    TONSILLECTOMY       Family History   Problem Relation Name Age of Onset    Hypertension Mother      Cancer Mother      Diabetes Mother      Breast cancer Mother      Diabetes Father 50     Hypertension Father 50     Hyperlipidemia Father 50     Heart  disease Father 50     Breast cancer Maternal Grandmother      Hypertension Maternal Grandmother      Heart disease Maternal Grandmother      Diabetes Maternal Grandfather      Heart disease Paternal Grandmother      Heart disease Paternal Grandfather 60     Diabetes Brother      Hypertension Brother      Cancer Brother      Melanoma Neg Hx       Social History     Socioeconomic History    Marital status:    Tobacco Use    Smoking status: Former     Current packs/day: 0.00     Types: Cigarettes     Quit date: 1993     Years since quittin.3    Smokeless tobacco: Never   Substance and Sexual Activity    Alcohol use: No    Sexual activity: Yes     Partners: Female     Social Drivers of Health     Financial Resource Strain: Low Risk  (2024)    Overall Financial Resource Strain (CARDIA)     Difficulty of Paying Living Expenses: Not hard at all   Food Insecurity: No Food Insecurity (2024)    Hunger Vital Sign     Worried About Running Out of Food in the Last Year: Never true     Ran Out of Food in the Last Year: Never true   Transportation Needs: No Transportation Needs (2024)    PRAPARE - Transportation     Lack of Transportation (Medical): No     Lack of Transportation (Non-Medical): No   Physical Activity: Insufficiently Active (2024)    Exercise Vital Sign     Days of Exercise per Week: 2 days     Minutes of Exercise per Session: 20 min   Stress: Stress Concern Present (2024)    [employer] - Occupational Stress Questionnaire     Feeling of Stress : To some extent   Housing Stability: Unknown (2024)    Housing Stability Vital Sign     Unable to Pay for Housing in the Last Year: No       Current Medications[1]  Review of patient's allergies indicates:   Allergen Reactions    Ace inhibitors Other (See Comments) and Anaphylaxis     cough    Amoxicillin Nausea And Vomiting    Ibuprofen Swelling    Meloxicam Swelling    Nitrofurantoin monohyd/m-cryst  Nausea And Vomiting    Aspirin Hives and Rash    Cefuroxime axetil Hives and Rash    Clindamycin Rash    Minocycline Rash       Objective:   Vital Signs (Most Recent):  There were no vitals filed for this visit.  There is no height or weight on file to calculate BMI.    Physical Exam:  Constitutional: The patient appears well-developed and well-nourished. No distress.   Skin: No lesions appreciated  Head: Normocephalic and atraumatic.   Nose: Nose normal.   Ears: No deformities seen  Eyes: Conjunctivae and EOM are normal.   Neck: No tracheal deviation present.   Cardiovascular: Normal rate and intact distal pulses.    Pulmonary/Chest: Effort normal. No respiratory distress.   Abdominal: There is no guarding.   Neurological: The patient is alert.   Psychiatric: The patient has a normal mood and affect.     Bilateral Hand/Wrist/Elbow Examination:    Observation/Inspection:  Swelling: Mild swelling left basilar thumb. Heberdens node radial aspect left thumb IP joint. Swelling over left radial styloid.     Deformity  none  Discoloration  none     Scars   none    Atrophy  none    HAND/WRIST/ELBOW EXAMINATION:  Left thumb: Bony swelling (osteophyte) radial aspect left thumb IP joint with TTP. TTP left thumb A1 pulley.  TTP left thumb CMC joint. Full thumb ROM, + pain with thumb extension, abduction, and opposition. No demonstrable clicking, triggering, or locking.     Left long finger: TTP PIP joint. No tenderness or swelling over A1 pulley. Full ROM, + pain with PIP joint flexion, + crepitus in PIP joint with active flexion and extension. No demonstrable catching, triggering, or locking.     Left Wrist: Swelling left radial styloid. TTP left radial styloid and 1st dorsal extensor compartment. Full ROM, + pain with radial deviation > ulnar deviation.     Otherwise, bilateral ROM hand/wrist/elbow full and painless    Finkelstein's Test   Mildly positive left   WHAT Test    Mildly positive left   CMC grind    Positive  left  Circumduction test   Positive left   Snuff box Tenderness   Neg  Whitehead's Test    Neg  TFCC Compression Test  Neg  Hook of Hamate Tenderness  Neg  Median Nerve Compression Test Mildly positive left, neg right  Tinel's Test - Carpal Tunnel  Neg shanti  Phalen's Test    Neg shanti  Tinel's Test - Cubital Tunnel  Neg shanti  Elbow Flexion Test    Neg    Neurovascular Exam:  Digits WWP, brisk CR < 3s throughout  Altered sensation in left radial sensory nerve distribution and volar thumb compared to right, otherwise NVI motor/LTS to M/R/U nerves, radial pulse 2+  2+ biceps and brachioradialis reflexes    Diagnostics Review:   Imaging: I independently viewed the patient's imaging as well as the radiology report.    My personal interpretation of X-rays AP, lateral, oblique left hand taken today demonstrate Eaton stage II basilar thumb arthritis, osteophyte on radial aspect thumb IP joint, and mild joint space narrowing in IP joints. No acute fracture or dislocation.    Assessment:   54 y.o. yo female with   Encounter Diagnoses   Name Primary?    Chronic pain of left hand Yes    Primary osteoarthritis of first carpometacarpal joint of left hand     De Quervain's tenosynovitis, left       Plan:   The patient and I had a thorough discussion today. We discussed the working diagnosis as well as several other potential alternative diagnoses. Treatment options were discussed, both conservative and surgical. Conservative treatment options would include things such as activity modifications, workplace modifications, a period of rest, ice/heat, oral vs topical OTC and prescription anti-inflammatory medications, acetaminophen, occupational therapy to include strengthening and range of motion exercises, splinting/bracing, immobilization, corticosteroid injections for temporary relief. Surgical options were discussed as well.     Patient would like to proceed with steroid injection for left thumb pain. I administered steroid injection  into the left thumb CMC joint today in clinic, patient tolerated the procedure well. Monitor blood sugar closely following steroid injection due to temporary increase in blood sugar in diabetic patients.  Referred patient to occupational therapy for fabrication of custom CMC comfort cool brace.  Wear during activity to offload thumb as needed.  Discussed potential future steroid injection in right wrist for De Quervain's tenosynovitis if symptoms persist.   Take Tylenol as needed for pain, do not exceed 3,000 mg per day. No oral or topical NSAIDs due to allergy.  Recommend heat application (heating pad, warm water, paraffin wax) for arthritic joint pains and stiffness.   Follow up in 3 weeks  Call with any questions/concerns in the interim    Injection Procedure:  -I have offered her a selective injection. I have explained the risks, benefits, and alternatives of the procedure in detail.  The patient voices understanding and all questions have been answered. The patient agrees to proceed as planned. So after a sterile prep of the skin in the normal fashion the left thumb CMC joint injected using a 25 gauge needle with a combination of 1 cc 1% plain lidocaine and 40 mg of methylprednisolone.  The patient is cautioned and immediate relief of pain is secondary to the local anesthetic and will be temporary.  After the anesthetic wears off there may be a increase in pain that may last for a few hours or a few days and they should use ice to help alleviate this flair up of pain. Patient tolerated the procedure well.     Should the patient's symptoms worsen, persist, or fail to improve they should return for reevaluation.     The patient's pathophysiology was explained in detail with reference to x-rays, models, other visual aids as appropriate. Treatment options were discussed in detail.  Questions were invited and answered to the patient's satisfaction. I reviewed Primary care and other specialty's notes to better  coordinate patient's care.    Lucy Montgomery PA-C  [facility]     This note was generated with the assistance of ambient listening technology. Verbal consent was obtained by the patient and accompanying visitor(s) for the recording of patient appointment to facilitate this note. I attest to having reviewed and edited the generated note for accuracy, though some syntax or spelling errors may persist. Please contact the author of this note for any clarification.     Please be aware that this note has been generated with the assistance of MModal voice-to-text.  Please excuse any spelling or grammatical errors.         [1]   Current Outpatient Medications   Medication Sig Dispense Refill    atenolol (TENORMIN) 50 MG tablet Take 50 mg by mouth once daily.       atorvastatin (LIPITOR) 20 MG tablet Take 1 tablet (20 mg total) by mouth once daily. 90 tablet 3    cloNIDine (CATAPRES) 0.1 MG tablet Take 1 tablet by mouth at bedtime 90 tablet 4    fexofenadine (ALLEGRA) 180 MG tablet Take 180 mg by mouth once daily.       flu vac ts 2016-17,4 yr up,/PF (FLU VAC TS 2014-15,36MOS+,,PF,) 45 mcg (15 mcg x 3)/0.5 mL Afluria 8570-1793(PF) 45 mcg (15 mcg x 3)/0.5 mL intramuscular syringe      hydroCHLOROthiazide (HYDRODIURIL) 25 MG tablet Take 25 mg by mouth once daily.      levothyroxine (SYNTHROID) 150 MCG tablet TAKE 1 TABLET BY MOUTH ON MONDAY - SATURDAY, THEN ON SUNDAYS TAKE 2 TABLETS 103 tablet 0    metFORMIN (GLUCOPHAGE) 500 MG tablet Take 500 mg by mouth 2 (two) times daily with meals.      montelukast (SINGULAIR) 10 mg tablet Take 10 mg by mouth every evening.       sertraline (ZOLOFT) 100 MG tablet Take 100 mg by mouth every evening.       No current facility-administered medications for this visit.

## 2025-05-09 NOTE — PROCEDURES
Small Joint Aspiration/Injection: L thumb CMC    Date/Time: 5/9/2025 10:30 AM    Performed by: Lucy Montgomery PA-C  Authorized by: Lucy Montgomery PA-C    Consent Done?:  Yes (Verbal)  Indications:  Pain  Timeout: prior to procedure the correct patient, procedure, and site was verified    Prep: patient was prepped and draped in usual sterile fashion      Local anesthesia used?: Yes    Anesthesia:  Local infiltration  Local anesthetic:  Lidocaine 1% without epinephrine  Anesthetic total (ml):  1    Location:  Thumb  Site:  L thumb CMC  Needle size:  25 G  Medications:  40 mg methylPREDNISolone acetate 40 mg/mL  Patient tolerance:  Patient tolerated the procedure well with no immediate complications

## 2025-05-14 RX ORDER — METHYLPREDNISOLONE ACETATE 40 MG/ML
40 INJECTION, SUSPENSION INTRA-ARTICULAR; INTRALESIONAL; INTRAMUSCULAR; SOFT TISSUE
Status: DISCONTINUED | OUTPATIENT
Start: 2025-05-09 | End: 2025-05-14 | Stop reason: HOSPADM

## 2025-05-20 ENCOUNTER — CLINICAL SUPPORT (OUTPATIENT)
Dept: REHABILITATION | Facility: HOSPITAL | Age: 55
End: 2025-05-20
Payer: COMMERCIAL

## 2025-05-20 DIAGNOSIS — M18.12 PRIMARY OSTEOARTHRITIS OF FIRST CARPOMETACARPAL JOINT OF LEFT HAND: ICD-10-CM

## 2025-05-20 PROCEDURE — 97760 ORTHOTIC MGMT&TRAING 1ST ENC: CPT

## 2025-05-20 PROCEDURE — L3923 HFO WITHOUT JOINTS PRE CST: HCPCS

## 2025-05-27 NOTE — PROGRESS NOTES
OT Orthosis Only    TIME RECORD    Date:  5/20/2025    Start Time:  3:15  Stop Time:  3:35        Occupational Therapy Orthotic Note    Patient: Ailyn Yanes  MRN: [id number]  Date of visit: 5/20/2025  Referring Physician:  Lucy Montgomery PA-C   Primary Diagnosis:   Encounter Diagnosis   Name Primary?    Primary osteoarthritis of first carpometacarpal joint of left hand        Subjective:   Pt. Verbalizes comfort of fit following orthosis application.     Objective:     Patient seen by OT this session for fabrication of orthosis per MD orders. Custom molded thumb support insert and applied  Comfort Cool Orthosis to left hand    Assessment:     Orthosis with good fit following fabrication. Pt. Able to kaylee/doff independently.      Patient Education/Response:   Ortho Fit and Train x 25 min    Pt. Instructed to wear as needed for symptom relief as well as at night.  Patient/caregiver were provided written instructions on orthosis purpose, wear schedule, care and precautions to monitor for increased pain/edema, pressure points, skin breakdown or redness/skin irritation Patient/caregiver to contact clinic for adjustments as needed.  Patient signed copy of orthosis instructions, acknowledging delivery and understanding of wear, care, and precautions     (see Media for scanned documents)    Plans and Goals:     Goal of Orthosis to decrease symptoms and protect joint. Pt. To DC with  Orthosis. Orthosis Check PRN, f/u with MD at [facility]

## 2025-05-30 ENCOUNTER — OFFICE VISIT (OUTPATIENT)
Dept: ORTHOPEDICS | Facility: CLINIC | Age: 55
End: 2025-05-30
Payer: COMMERCIAL

## 2025-05-30 VITALS — BODY MASS INDEX: 40.03 KG/M2 | WEIGHT: 255.06 LBS | HEIGHT: 67 IN

## 2025-05-30 DIAGNOSIS — M18.12 PRIMARY OSTEOARTHRITIS OF FIRST CARPOMETACARPAL JOINT OF LEFT HAND: Primary | ICD-10-CM

## 2025-05-30 DIAGNOSIS — M79.642 CHRONIC PAIN OF LEFT HAND: ICD-10-CM

## 2025-05-30 DIAGNOSIS — G89.29 CHRONIC PAIN OF LEFT HAND: ICD-10-CM

## 2025-05-30 DIAGNOSIS — M65.332 TRIGGER MIDDLE FINGER OF LEFT HAND: ICD-10-CM

## 2025-05-30 PROCEDURE — 99999 PR PBB SHADOW E&M-EST. PATIENT-LVL III: CPT | Mod: PBBFAC,,,

## 2025-05-30 PROCEDURE — 1159F MED LIST DOCD IN RCRD: CPT | Mod: CPTII,S$GLB,,

## 2025-05-30 PROCEDURE — 3008F BODY MASS INDEX DOCD: CPT | Mod: CPTII,S$GLB,,

## 2025-05-30 PROCEDURE — 99214 OFFICE O/P EST MOD 30 MIN: CPT | Mod: S$GLB,,,

## 2025-06-11 ENCOUNTER — CLINICAL SUPPORT (OUTPATIENT)
Dept: REHABILITATION | Facility: HOSPITAL | Age: 55
End: 2025-06-11
Payer: COMMERCIAL

## 2025-06-11 DIAGNOSIS — M79.642 LEFT HAND PAIN: Primary | ICD-10-CM

## 2025-06-11 PROCEDURE — 97763 ORTHC/PROSTC MGMT SBSQ ENC: CPT

## 2025-06-30 DIAGNOSIS — E78.5 DYSLIPIDEMIA: ICD-10-CM

## 2025-06-30 DIAGNOSIS — E11.9 CONTROLLED TYPE 2 DIABETES MELLITUS WITHOUT COMPLICATION, WITHOUT LONG-TERM CURRENT USE OF INSULIN: ICD-10-CM

## 2025-06-30 PROBLEM — M79.642 LEFT HAND PAIN: Status: ACTIVE | Noted: 2025-06-30

## 2025-07-01 RX ORDER — ATORVASTATIN CALCIUM 20 MG/1
20 TABLET, FILM COATED ORAL
Qty: 90 TABLET | Refills: 3 | Status: SHIPPED | OUTPATIENT
Start: 2025-07-01

## 2025-07-01 NOTE — PROGRESS NOTES
Outpatient Rehab    Occupational Therapy Visit    Patient Name: Ailyn Yanes  MRN: [id number]  YOB: 1970  Encounter Date: 6/11/2025    Therapy Diagnosis: No diagnosis found.  Physician: Lucy Montgomery PA-C    Physician Orders: Eval and Treat  Medical Diagnosis: Primary osteoarthritis of first carpometacarpal joint of left hand  Surgical Diagnosis: Not applicable for this Episode   Surgical Date: Not applicable for this Episode  Days Since Last Surgery: Not applicable for this Episode    Visit # / Visits Authorized: 1 / 20  Insurance Authorization Period: 6/11/2025 to 12/31/2025  Date of Evaluation:   Plan of Care Certification:       Time In:     Time Out:    Total Time (in minutes):     Total Billable Time (in minutes):      FOTO:  Intake Score:  %  Survey Score 2:  %  Survey Score 3:  %    Precautions:       Subjective   Pt presents with brace from previous visit with an ill fit..         Objective            Treatment:       Time Entry(in minutes):       Assessment & Plan   Assessment: Pt satisfied with new orthosis and and comfort at this time.        The patient will continue to benefit from skilled outpatient occupational therapy in order to address the deficits listed in the problem list on the initial evaluation, provide patient and family education, and maximize the patients level of independence in the home and community environments.     The patient's spiritual, cultural, and educational needs were considered, and the patient is agreeable to the plan of care and goals.           Plan:      Goals:     Cande Lowe OT

## 2025-07-25 ENCOUNTER — OFFICE VISIT (OUTPATIENT)
Dept: ORTHOPEDICS | Facility: CLINIC | Age: 55
End: 2025-07-25
Payer: COMMERCIAL

## 2025-07-25 VITALS — WEIGHT: 253.5 LBS | BODY MASS INDEX: 39.79 KG/M2 | HEIGHT: 67 IN

## 2025-07-25 DIAGNOSIS — M79.642 CHRONIC PAIN OF LEFT HAND: ICD-10-CM

## 2025-07-25 DIAGNOSIS — G89.29 CHRONIC PAIN OF LEFT HAND: ICD-10-CM

## 2025-07-25 DIAGNOSIS — M18.12 PRIMARY OSTEOARTHRITIS OF FIRST CARPOMETACARPAL JOINT OF LEFT HAND: Primary | ICD-10-CM

## 2025-07-25 PROCEDURE — 99999 PR PBB SHADOW E&M-EST. PATIENT-LVL III: CPT | Mod: PBBFAC,,,

## 2025-07-25 PROCEDURE — 20600 DRAIN/INJ JOINT/BURSA W/O US: CPT | Mod: LT,S$GLB,,

## 2025-07-25 PROCEDURE — 3008F BODY MASS INDEX DOCD: CPT | Mod: CPTII,S$GLB,,

## 2025-07-25 PROCEDURE — 99213 OFFICE O/P EST LOW 20 MIN: CPT | Mod: 25,S$GLB,,

## 2025-07-25 PROCEDURE — 1159F MED LIST DOCD IN RCRD: CPT | Mod: CPTII,S$GLB,,

## 2025-07-25 PROCEDURE — 1160F RVW MEDS BY RX/DR IN RCRD: CPT | Mod: CPTII,S$GLB,,

## 2025-07-25 RX ADMIN — METHYLPREDNISOLONE ACETATE 40 MG: 40 INJECTION, SUSPENSION INTRA-ARTICULAR; INTRALESIONAL; INTRAMUSCULAR; SOFT TISSUE at 03:07

## 2025-07-25 NOTE — PROGRESS NOTES
[facility]  Established Patient  Orthopedics  Ailyn Yanes presents for follow up evaluation of   Encounter Diagnoses   Name Primary?    Primary osteoarthritis of first carpometacarpal joint of left hand Yes    Chronic pain of left hand      HPI 7/25/2025:  Patient presents for follow up of left thumb basilar thumb arthritis. She received a steroid injection in left thumb on 5/9/2025, which provided about 60% relief initially, but effects wore off and her pain has returned. Pain is located at the base of the thumb, described as tender, and radiates around the area. Pain is exacerbated by movement, particularly gripping objects. She wears a comfort cool brace at night and while typing at work, which provides relief. She uses topical salonpas and Tylenol to manage pain. She is seeking another injection before an upcoming cruise vacation next week. Her middle finger, which was previously problematic, has improved without injections. She briefly experienced wrist pain a few days ago, but it resolved quickly and is not a current concern. She denies any numbness or tingling in the hands.     Review of Systems:  As per HPI, otherwise noncontributory.    Physical Exam:  Vitals:    07/25/25 1536   PainSc:   6      AA&O x 4.  NAD  HEENT:  NCAT, sclera nonicteric  Lungs:  Respirations are equal and unlabored.  CV:  2+ bilateral upper and lower extremity pulses.  MSK: Heberden's nodes left thumb IP joint. TTP left thumb cmc joint. + cmc grind and circumduction test on left thumb. Decreased ROM of left thumb with telescoping pain. Left long finger nontender to palpation, full ROM without pain, catching, triggering, or locking. Otherwise full and painless range of motion bilateral hands, wrists, and elbows. Neurovascularly intact bilaterally.  Negative median nerve compression and tinels test at wrist bilaterally. 5/5 thenar and intrinsic musculature strength.    Diagnostic studies and other clinical  records review:  I independently viewed the patient's imaging as well as the radiology report.    My personal interpretation of X-rays AP, lateral, oblique left hand demonstrate Eaton stage II basilar thumb arthritis, osteophyte on radial aspect thumb IP joint, and mild joint space narrowing in IP joints. No acute fracture or dislocation.     Assessment/Plan:   Encounter Diagnoses   Name Primary?    Primary osteoarthritis of first carpometacarpal joint of left hand Yes    Chronic pain of left hand      The patient and I had a thorough discussion today. We discussed the working diagnosis as well as several other potential alternative diagnoses. Treatment options were discussed, both conservative and surgical. Conservative treatment options would include things such as activity modifications, workplace modifications, a period of rest, oral vs topical OTC and prescription anti-inflammatory medications, occupational therapy, splinting/bracing, immobilization, corticosteroid injections, and others. Surgical options were discussed as well.     Administered steroid injection into the left thumb CMC joint today in clinic. She tolerated the procedure well. Monitor blood sugar closely following steroid injection due to temporary increase in blood sugar in diabetic patients. Limit injections to 3-4 per year.  Continue use of comfort cool brace during activity  F/U as needed for any worsening symptoms  Call with any questions/concerns in the interim    Injection Procedure:  -I have offered her a selective injection. I have explained the risks, benefits, and alternatives of the procedure in detail.  The patient voices understanding and all questions have been answered. The patient agrees to proceed as planned. So after a sterile prep of the skin in the normal fashion the left thumb CMC joint injected using a 25 gauge needle with a combination of 1 cc 1% plain lidocaine and 40 mg of methylprednisolone.  The patient is cautioned  and immediate relief of pain is secondary to the local anesthetic and will be temporary.  After the anesthetic wears off there may be a increase in pain that may last for a few hours or a few days and they should use ice to help alleviate this flair up of pain. Patient tolerated the procedure well.    The patient's pathophysiology was explained in detail with reference to x-rays, models, other visual aids as appropriate. Treatment options were discussed in detail.  Questions were invited and answered to the patient's satisfaction. I reviewed Primary care and other specialty's notes to better coordinate patient's care.     Lucy Montgomery PA-C  [facility]     This note was generated with the assistance of ambient listening technology. Verbal consent was obtained by the patient and accompanying visitor(s) for the recording of patient appointment to facilitate this note. I attest to having reviewed and edited the generated note for accuracy, though some syntax or spelling errors may persist. Please contact the author of this note for any clarification.     Please be aware that this note has been generated with the assistance of [email or web address] voice-to-text. Please excuse any spelling or grammatical errors.

## 2025-07-25 NOTE — PROCEDURES
Small Joint Aspiration/Injection: L thumb CMC    Date/Time: 7/25/2025 3:30 PM    Performed by: Lucy Montgomery PA-C  Authorized by: Lucy Montgomery PA-C    Timeout: prior to procedure the correct patient, procedure, and site was verified    Prep: patient was prepped and draped in usual sterile fashion      Local anesthesia used?: Yes    Anesthesia:  Local infiltration  Local anesthetic:  Lidocaine 1% without epinephrine  Anesthetic total (ml):  1    Location:  Thumb  Site:  L thumb CMC  Ultrasonic guidance for needle placement?: No    Needle size:  25 G  Medications:  40 mg methylPREDNISolone acetate 40 mg/mL  Patient tolerance:  Patient tolerated the procedure well with no immediate complications

## 2025-07-29 RX ORDER — METHYLPREDNISOLONE ACETATE 40 MG/ML
40 INJECTION, SUSPENSION INTRA-ARTICULAR; INTRALESIONAL; INTRAMUSCULAR; SOFT TISSUE
Status: DISCONTINUED | OUTPATIENT
Start: 2025-07-25 | End: 2025-07-30 | Stop reason: HOSPADM

## 2025-09-02 ENCOUNTER — LAB VISIT (OUTPATIENT)
Dept: LAB | Facility: HOSPITAL | Age: 55
End: 2025-09-02
Attending: INTERNAL MEDICINE
Payer: COMMERCIAL

## 2025-09-02 DIAGNOSIS — E11.9 CONTROLLED TYPE 2 DIABETES MELLITUS WITHOUT COMPLICATION, WITHOUT LONG-TERM CURRENT USE OF INSULIN: ICD-10-CM

## 2025-09-02 LAB
ALBUMIN/CREAT UR: 37.2 UG/MG
CREAT UR-MCNC: 53.7 MG/DL (ref 15–325)
EAG (OHS): 146 MG/DL (ref 68–131)
HBA1C MFR BLD: 6.7 % (ref 4–5.6)
MICROALBUMIN UR-MCNC: 20 UG/ML

## 2025-09-02 PROCEDURE — 82043 UR ALBUMIN QUANTITATIVE: CPT

## 2025-09-02 PROCEDURE — 36415 COLL VENOUS BLD VENIPUNCTURE: CPT

## 2025-09-02 PROCEDURE — 83036 HEMOGLOBIN GLYCOSYLATED A1C: CPT

## (undated) DEVICE — COVER MAYO STAND REINFRCD 30

## (undated) DEVICE — APPLICATOR CHLORAPREP ORN 26ML

## (undated) DEVICE — PAD ELECTRODE STER 1.5X3

## (undated) DEVICE — UNDERGLOVES BIOGEL PI SIZE 7.5

## (undated) DEVICE — PAD ABD 8X10 STERILE

## (undated) DEVICE — DRESSING XEROFORM FOIL PK 1X8

## (undated) DEVICE — CLOSURE SKIN STERI STRIP 1/2X4

## (undated) DEVICE — GLOVE ORTHO PF SZ 8.5

## (undated) DEVICE — NDL 25G X 5/8 REG BEVEL

## (undated) DEVICE — ADHESIVE MASTISOL VIAL 48/BX

## (undated) DEVICE — SEE MEDLINE ITEM 157117

## (undated) DEVICE — SEE MEDLINE ITEM 157169

## (undated) DEVICE — SOL 9P NACL IRR PIC IL

## (undated) DEVICE — PAD COLD THERAPY KNEE WRAP ON

## (undated) DEVICE — Device

## (undated) DEVICE — PAD CAST SPECIALIST STRL 6

## (undated) DEVICE — GOWN B1 X-LG X-LONG

## (undated) DEVICE — GLOVE BIOGEL SKINSENSE PI 7.0

## (undated) DEVICE — SUT MCRYL PLUS 4-0 PS2 27IN

## (undated) DEVICE — SYR 10CC LUER LOCK

## (undated) DEVICE — PROBE ARTHO ENERGY 90 DEG

## (undated) DEVICE — GLOVE SURGEON SYN PF SZ 9

## (undated) DEVICE — GAUZE SPONGE 4X4 12PLY

## (undated) DEVICE — TUBE SET INFLOW/OUTFLOW

## (undated) DEVICE — SOL IRR NACL .9% 3000ML

## (undated) DEVICE — GOWN SMART IMP BREATHABLE XXLG

## (undated) DEVICE — SLEEVE PROTECTIVE 6X9 NON STER